# Patient Record
Sex: FEMALE | Race: ASIAN | Employment: UNEMPLOYED | URBAN - METROPOLITAN AREA
[De-identification: names, ages, dates, MRNs, and addresses within clinical notes are randomized per-mention and may not be internally consistent; named-entity substitution may affect disease eponyms.]

---

## 2017-01-05 PROBLEM — R10.84 GENERALIZED ABDOMINAL PAIN: Status: ACTIVE | Noted: 2017-01-05

## 2017-01-05 PROBLEM — D64.9 NORMOCYTIC ANEMIA: Status: ACTIVE | Noted: 2017-01-05

## 2017-01-05 PROBLEM — D69.6 THROMBOCYTOPENIA (HCC): Status: ACTIVE | Noted: 2017-01-05

## 2017-02-01 ENCOUNTER — OFFICE VISIT (OUTPATIENT)
Dept: FAMILY MEDICINE CLINIC | Age: 57
End: 2017-02-01

## 2017-02-01 VITALS
RESPIRATION RATE: 16 BRPM | OXYGEN SATURATION: 98 % | DIASTOLIC BLOOD PRESSURE: 66 MMHG | WEIGHT: 157 LBS | HEIGHT: 61 IN | SYSTOLIC BLOOD PRESSURE: 112 MMHG | HEART RATE: 83 BPM | TEMPERATURE: 97 F | BODY MASS INDEX: 29.64 KG/M2

## 2017-02-01 DIAGNOSIS — Z23 FLU VACCINE NEED: ICD-10-CM

## 2017-02-01 DIAGNOSIS — J45.20 MILD INTERMITTENT ASTHMA WITHOUT COMPLICATION: Primary | Chronic | ICD-10-CM

## 2017-02-01 DIAGNOSIS — Z13.220 SCREENING, LIPID: ICD-10-CM

## 2017-02-01 DIAGNOSIS — D69.6 THROMBOCYTOPENIA (HCC): ICD-10-CM

## 2017-02-01 DIAGNOSIS — G89.29 CHRONIC BILATERAL LOW BACK PAIN WITHOUT SCIATICA: ICD-10-CM

## 2017-02-01 DIAGNOSIS — Z12.11 SCREEN FOR COLON CANCER: ICD-10-CM

## 2017-02-01 DIAGNOSIS — D64.9 NORMOCYTIC ANEMIA: ICD-10-CM

## 2017-02-01 DIAGNOSIS — K21.9 GASTROESOPHAGEAL REFLUX DISEASE WITHOUT ESOPHAGITIS: ICD-10-CM

## 2017-02-01 DIAGNOSIS — M54.50 CHRONIC BILATERAL LOW BACK PAIN WITHOUT SCIATICA: ICD-10-CM

## 2017-02-01 DIAGNOSIS — F51.04 CHRONIC INSOMNIA: ICD-10-CM

## 2017-02-01 PROCEDURE — 99214 OFFICE O/P EST MOD 30 MIN: CPT | Performed by: FAMILY MEDICINE

## 2017-02-01 PROCEDURE — 90471 IMMUNIZATION ADMIN: CPT | Performed by: FAMILY MEDICINE

## 2017-02-01 PROCEDURE — 90654 INFLUENZA, 18-64 YRS, INTRADERMAL, PF (FLUZONE PF ID): CPT | Performed by: FAMILY MEDICINE

## 2017-02-01 RX ORDER — TRAZODONE HYDROCHLORIDE 50 MG/1
50 TABLET ORAL NIGHTLY PRN
Qty: 30 TABLET | Refills: 5 | Status: SHIPPED | OUTPATIENT
Start: 2017-02-01 | End: 2017-05-23 | Stop reason: SDUPTHER

## 2017-02-01 RX ORDER — OMEPRAZOLE 20 MG/1
20 CAPSULE, DELAYED RELEASE ORAL DAILY
Qty: 90 CAPSULE | Refills: 1 | Status: SHIPPED | OUTPATIENT
Start: 2017-02-01 | End: 2017-05-23 | Stop reason: SDUPTHER

## 2017-02-01 RX ORDER — SERTRALINE HYDROCHLORIDE 100 MG/1
100 TABLET, FILM COATED ORAL DAILY
Qty: 60 TABLET | Refills: 3 | Status: SHIPPED | OUTPATIENT
Start: 2017-02-01 | End: 2017-05-23 | Stop reason: SDUPTHER

## 2017-05-05 ENCOUNTER — OFFICE VISIT (OUTPATIENT)
Dept: NEUROLOGY | Age: 57
End: 2017-05-05

## 2017-05-05 VITALS
WEIGHT: 158 LBS | HEART RATE: 68 BPM | HEIGHT: 62 IN | DIASTOLIC BLOOD PRESSURE: 83 MMHG | SYSTOLIC BLOOD PRESSURE: 123 MMHG | BODY MASS INDEX: 29.08 KG/M2

## 2017-05-05 DIAGNOSIS — G43.719 INTRACTABLE CHRONIC MIGRAINE WITHOUT AURA AND WITHOUT STATUS MIGRAINOSUS: Primary | ICD-10-CM

## 2017-05-05 DIAGNOSIS — G44.40 REBOUND HEADACHE: ICD-10-CM

## 2017-05-05 PROCEDURE — 99213 OFFICE O/P EST LOW 20 MIN: CPT | Performed by: PSYCHIATRY & NEUROLOGY

## 2017-05-05 RX ORDER — TOPIRAMATE 50 MG/1
50 TABLET, FILM COATED ORAL 2 TIMES DAILY
COMMUNITY
End: 2017-05-05 | Stop reason: SDUPTHER

## 2017-05-05 RX ORDER — TOPIRAMATE 50 MG/1
50 TABLET, FILM COATED ORAL 2 TIMES DAILY
Qty: 60 TABLET | Refills: 5 | Status: SHIPPED | OUTPATIENT
Start: 2017-05-05 | End: 2018-04-10 | Stop reason: SDUPTHER

## 2017-05-23 ENCOUNTER — OFFICE VISIT (OUTPATIENT)
Dept: INTERNAL MEDICINE CLINIC | Age: 57
End: 2017-05-23

## 2017-05-23 VITALS
SYSTOLIC BLOOD PRESSURE: 90 MMHG | WEIGHT: 156 LBS | HEART RATE: 75 BPM | HEIGHT: 58 IN | BODY MASS INDEX: 32.75 KG/M2 | DIASTOLIC BLOOD PRESSURE: 58 MMHG | OXYGEN SATURATION: 98 %

## 2017-05-23 DIAGNOSIS — G43.901 MIGRAINE WITH STATUS MIGRAINOSUS, NOT INTRACTABLE, UNSPECIFIED MIGRAINE TYPE: ICD-10-CM

## 2017-05-23 DIAGNOSIS — F51.04 CHRONIC INSOMNIA: ICD-10-CM

## 2017-05-23 DIAGNOSIS — Z23 NEED FOR STREPTOCOCCUS PNEUMONIAE AND INFLUENZA VACCINATION: ICD-10-CM

## 2017-05-23 DIAGNOSIS — M54.6 ACUTE LEFT-SIDED THORACIC BACK PAIN: ICD-10-CM

## 2017-05-23 DIAGNOSIS — M06.9 RHEUMATOID ARTHRITIS INVOLVING MULTIPLE SITES, UNSPECIFIED RHEUMATOID FACTOR PRESENCE: Primary | ICD-10-CM

## 2017-05-23 DIAGNOSIS — Z13.220 SCREENING, LIPID: ICD-10-CM

## 2017-05-23 PROCEDURE — 99215 OFFICE O/P EST HI 40 MIN: CPT | Performed by: NURSE PRACTITIONER

## 2017-05-23 PROCEDURE — 90670 PCV13 VACCINE IM: CPT | Performed by: NURSE PRACTITIONER

## 2017-05-23 PROCEDURE — 36415 COLL VENOUS BLD VENIPUNCTURE: CPT | Performed by: NURSE PRACTITIONER

## 2017-05-23 PROCEDURE — 90471 IMMUNIZATION ADMIN: CPT | Performed by: NURSE PRACTITIONER

## 2017-05-23 RX ORDER — SUCRALFATE 1 G/1
1 TABLET ORAL 4 TIMES DAILY
Qty: 120 TABLET | Refills: 3 | Status: SHIPPED | OUTPATIENT
Start: 2017-05-23 | End: 2018-02-23 | Stop reason: SDUPTHER

## 2017-05-23 RX ORDER — ALBUTEROL SULFATE 90 UG/1
2 AEROSOL, METERED RESPIRATORY (INHALATION) EVERY 6 HOURS PRN
Qty: 1 INHALER | Refills: 1 | Status: CANCELLED | OUTPATIENT
Start: 2017-05-23

## 2017-05-23 RX ORDER — SUMATRIPTAN 50 MG/1
50 TABLET, FILM COATED ORAL
Qty: 9 TABLET | Refills: 3 | Status: SHIPPED | OUTPATIENT
Start: 2017-05-23 | End: 2017-06-19 | Stop reason: CLARIF

## 2017-05-23 RX ORDER — CHOLECALCIFEROL (VITAMIN D3) 50 MCG
2000 TABLET ORAL DAILY
Qty: 30 TABLET | Refills: 11 | Status: CANCELLED | OUTPATIENT
Start: 2017-05-23

## 2017-05-23 RX ORDER — TRAZODONE HYDROCHLORIDE 50 MG/1
50 TABLET ORAL NIGHTLY PRN
Qty: 30 TABLET | Refills: 5 | Status: SHIPPED | OUTPATIENT
Start: 2017-05-23 | End: 2017-11-20 | Stop reason: SDUPTHER

## 2017-05-23 RX ORDER — OMEPRAZOLE 20 MG/1
20 CAPSULE, DELAYED RELEASE ORAL DAILY
Qty: 90 CAPSULE | Refills: 1 | Status: SHIPPED | OUTPATIENT
Start: 2017-05-23 | End: 2017-10-04 | Stop reason: SDUPTHER

## 2017-05-23 RX ORDER — SERTRALINE HYDROCHLORIDE 100 MG/1
100 TABLET, FILM COATED ORAL DAILY
Qty: 60 TABLET | Refills: 3 | Status: SHIPPED | OUTPATIENT
Start: 2017-05-23 | End: 2017-11-20 | Stop reason: SDUPTHER

## 2017-05-23 RX ORDER — LEVOTHYROXINE SODIUM 0.07 MG/1
75 TABLET ORAL DAILY
Qty: 90 TABLET | Refills: 1 | Status: SHIPPED | OUTPATIENT
Start: 2017-05-23 | End: 2017-10-04 | Stop reason: SDUPTHER

## 2017-05-23 RX ORDER — POLYETHYLENE GLYCOL 3350 17 G/17G
17 POWDER, FOR SOLUTION ORAL DAILY
Qty: 1 BOTTLE | Refills: 2 | Status: SHIPPED | OUTPATIENT
Start: 2017-05-23 | End: 2019-05-28 | Stop reason: ALTCHOICE

## 2017-05-23 RX ORDER — CLOTRIMAZOLE 1 %
CREAM (GRAM) TOPICAL
Qty: 12 G | Refills: 1 | Status: SHIPPED | OUTPATIENT
Start: 2017-05-23 | End: 2017-05-30

## 2017-05-23 RX ORDER — ERGOCALCIFEROL 1.25 MG/1
50000 CAPSULE ORAL WEEKLY
Qty: 4 CAPSULE | Refills: 2 | Status: SHIPPED | OUTPATIENT
Start: 2017-05-23 | End: 2019-01-30 | Stop reason: SDUPTHER

## 2017-05-23 ASSESSMENT — ENCOUNTER SYMPTOMS
SHORTNESS OF BREATH: 0
ABDOMINAL PAIN: 1

## 2017-05-24 DIAGNOSIS — Z13.220 SCREENING, LIPID: ICD-10-CM

## 2017-05-25 DIAGNOSIS — M54.6 ACUTE LEFT-SIDED THORACIC BACK PAIN: ICD-10-CM

## 2017-05-25 LAB
ALBUMIN SERPL-MCNC: 4.8 G/DL (ref 3.4–5)
ANION GAP SERPL CALCULATED.3IONS-SCNC: 20 MMOL/L (ref 3–16)
BUN BLDV-MCNC: 11 MG/DL (ref 7–20)
CALCIUM SERPL-MCNC: 9.6 MG/DL (ref 8.3–10.6)
CHLORIDE BLD-SCNC: 102 MMOL/L (ref 99–110)
CHOLESTEROL, TOTAL: 159 MG/DL (ref 0–199)
CO2: 20 MMOL/L (ref 21–32)
CREAT SERPL-MCNC: 0.5 MG/DL (ref 0.6–1.1)
ESTIMATED AVERAGE GLUCOSE: 122.6 MG/DL
GFR AFRICAN AMERICAN: >60
GFR NON-AFRICAN AMERICAN: >60
GLUCOSE BLD-MCNC: 81 MG/DL (ref 70–99)
HBA1C MFR BLD: 5.9 %
HDLC SERPL-MCNC: 37 MG/DL (ref 40–60)
LDL CHOLESTEROL CALCULATED: 94 MG/DL
PHOSPHORUS: 3.7 MG/DL (ref 2.5–4.9)
POTASSIUM SERPL-SCNC: 4 MMOL/L (ref 3.5–5.1)
SODIUM BLD-SCNC: 142 MMOL/L (ref 136–145)
T3 FREE: 4 PG/ML (ref 2.3–4.2)
T4 FREE: 1.8 NG/DL (ref 0.9–1.8)
TRIGL SERPL-MCNC: 139 MG/DL (ref 0–150)
TSH REFLEX: 0.06 UIU/ML (ref 0.27–4.2)
VLDLC SERPL CALC-MCNC: 28 MG/DL

## 2017-05-26 LAB — HIV-1 AND HIV-2 ANTIBODIES: NEGATIVE

## 2017-06-19 ENCOUNTER — OFFICE VISIT (OUTPATIENT)
Dept: INTERNAL MEDICINE CLINIC | Age: 57
End: 2017-06-19

## 2017-06-19 VITALS
WEIGHT: 157 LBS | OXYGEN SATURATION: 97 % | HEART RATE: 70 BPM | BODY MASS INDEX: 32.81 KG/M2 | SYSTOLIC BLOOD PRESSURE: 114 MMHG | DIASTOLIC BLOOD PRESSURE: 70 MMHG

## 2017-06-19 DIAGNOSIS — B35.3 TINEA PEDIS OF BOTH FEET: ICD-10-CM

## 2017-06-19 DIAGNOSIS — M54.5 CHRONIC BILATERAL LOW BACK PAIN, WITH SCIATICA PRESENCE UNSPECIFIED: ICD-10-CM

## 2017-06-19 DIAGNOSIS — G43.709 CHRONIC MIGRAINE WITHOUT AURA WITHOUT STATUS MIGRAINOSUS, NOT INTRACTABLE: Primary | ICD-10-CM

## 2017-06-19 DIAGNOSIS — H60.502 ACUTE NONINFECTIVE OTITIS EXTERNA OF LEFT EAR, UNSPECIFIED TYPE: ICD-10-CM

## 2017-06-19 DIAGNOSIS — Z00.01 ENCOUNTER FOR WELL ADULT EXAM WITH ABNORMAL FINDINGS: ICD-10-CM

## 2017-06-19 DIAGNOSIS — G89.29 CHRONIC BILATERAL LOW BACK PAIN, WITH SCIATICA PRESENCE UNSPECIFIED: ICD-10-CM

## 2017-06-19 PROCEDURE — 99396 PREV VISIT EST AGE 40-64: CPT | Performed by: NURSE PRACTITIONER

## 2017-06-19 RX ORDER — CIPROFLOXACIN AND DEXAMETHASONE 3; 1 MG/ML; MG/ML
4 SUSPENSION/ DROPS AURICULAR (OTIC) 2 TIMES DAILY
Qty: 7.5 ML | Refills: 0 | Status: SHIPPED | OUTPATIENT
Start: 2017-06-19 | End: 2017-06-26

## 2017-06-19 RX ORDER — CLOTRIMAZOLE 1 %
CREAM (GRAM) TOPICAL
Qty: 40 G | Refills: 1 | Status: SHIPPED | OUTPATIENT
Start: 2017-06-19 | End: 2017-06-26

## 2017-06-19 RX ORDER — AMITRIPTYLINE HYDROCHLORIDE 10 MG/1
10 TABLET, FILM COATED ORAL NIGHTLY PRN
Qty: 30 TABLET | Refills: 3 | Status: SHIPPED | OUTPATIENT
Start: 2017-06-19 | End: 2017-11-20 | Stop reason: SDUPTHER

## 2017-06-19 RX ORDER — IBUPROFEN 800 MG/1
800 TABLET ORAL EVERY 8 HOURS PRN
Qty: 80 TABLET | Refills: 2 | Status: SHIPPED | OUTPATIENT
Start: 2017-06-19 | End: 2017-08-16 | Stop reason: ALTCHOICE

## 2017-06-19 ASSESSMENT — ENCOUNTER SYMPTOMS: BURN: 1

## 2017-06-20 ASSESSMENT — ENCOUNTER SYMPTOMS: ABDOMINAL PAIN: 1

## 2017-07-25 ENCOUNTER — OFFICE VISIT (OUTPATIENT)
Dept: INTERNAL MEDICINE CLINIC | Age: 57
End: 2017-07-25

## 2017-07-25 VITALS
HEIGHT: 58 IN | DIASTOLIC BLOOD PRESSURE: 70 MMHG | BODY MASS INDEX: 32.87 KG/M2 | HEART RATE: 75 BPM | TEMPERATURE: 98.3 F | SYSTOLIC BLOOD PRESSURE: 110 MMHG | WEIGHT: 156.6 LBS | RESPIRATION RATE: 16 BRPM | OXYGEN SATURATION: 96 %

## 2017-07-25 DIAGNOSIS — R06.02 SHORTNESS OF BREATH: Primary | ICD-10-CM

## 2017-07-25 DIAGNOSIS — M25.561 CHRONIC PAIN OF RIGHT KNEE: ICD-10-CM

## 2017-07-25 DIAGNOSIS — G89.29 CHRONIC PAIN OF RIGHT KNEE: ICD-10-CM

## 2017-07-25 PROCEDURE — 99213 OFFICE O/P EST LOW 20 MIN: CPT | Performed by: FAMILY MEDICINE

## 2017-07-25 RX ORDER — ALBUTEROL SULFATE 90 UG/1
2 AEROSOL, METERED RESPIRATORY (INHALATION) EVERY 6 HOURS PRN
Qty: 1 INHALER | Refills: 5 | Status: SHIPPED | OUTPATIENT
Start: 2017-07-25 | End: 2018-02-23 | Stop reason: SDUPTHER

## 2017-07-25 RX ORDER — HYDROCODONE BITARTRATE AND ACETAMINOPHEN 5; 325 MG/1; MG/1
1 TABLET ORAL EVERY 8 HOURS PRN
Qty: 30 TABLET | Refills: 0 | Status: SHIPPED | OUTPATIENT
Start: 2017-07-25 | End: 2017-08-04

## 2017-08-02 ENCOUNTER — HOSPITAL ENCOUNTER (OUTPATIENT)
Dept: OTHER | Age: 57
Discharge: HOME | End: 2017-08-02
Attending: FAMILY MEDICINE | Admitting: FAMILY MEDICINE

## 2017-08-02 DIAGNOSIS — G89.29 CHRONIC PAIN OF RIGHT KNEE: ICD-10-CM

## 2017-08-02 DIAGNOSIS — M25.561 CHRONIC PAIN OF RIGHT KNEE: ICD-10-CM

## 2017-08-02 DIAGNOSIS — M25.562 PAIN IN BOTH KNEES, UNSPECIFIED CHRONICITY: ICD-10-CM

## 2017-08-02 DIAGNOSIS — M25.561 PAIN IN BOTH KNEES, UNSPECIFIED CHRONICITY: ICD-10-CM

## 2017-08-15 ENCOUNTER — OFFICE VISIT (OUTPATIENT)
Dept: RHEUMATOLOGY | Age: 57
End: 2017-08-15

## 2017-08-15 VITALS
SYSTOLIC BLOOD PRESSURE: 124 MMHG | DIASTOLIC BLOOD PRESSURE: 82 MMHG | TEMPERATURE: 98.6 F | BODY MASS INDEX: 29.44 KG/M2 | WEIGHT: 160 LBS | HEIGHT: 62 IN

## 2017-08-15 DIAGNOSIS — K21.9 GASTROESOPHAGEAL REFLUX DISEASE WITHOUT ESOPHAGITIS: ICD-10-CM

## 2017-08-15 DIAGNOSIS — M15.9 GENERALIZED OSTEOARTHRITIS: Primary | ICD-10-CM

## 2017-08-15 PROCEDURE — 99244 OFF/OP CNSLTJ NEW/EST MOD 40: CPT | Performed by: INTERNAL MEDICINE

## 2017-08-15 RX ORDER — TIZANIDINE 2 MG/1
TABLET ORAL
Qty: 90 TABLET | Refills: 1 | Status: SHIPPED | OUTPATIENT
Start: 2017-08-15 | End: 2019-10-11 | Stop reason: SDUPTHER

## 2017-08-16 ENCOUNTER — OFFICE VISIT (OUTPATIENT)
Dept: INTERNAL MEDICINE CLINIC | Age: 57
End: 2017-08-16

## 2017-08-16 VITALS
TEMPERATURE: 98.5 F | SYSTOLIC BLOOD PRESSURE: 124 MMHG | OXYGEN SATURATION: 96 % | BODY MASS INDEX: 29.26 KG/M2 | WEIGHT: 159 LBS | DIASTOLIC BLOOD PRESSURE: 80 MMHG | HEART RATE: 75 BPM

## 2017-08-16 DIAGNOSIS — F41.9 ANXIETY DISORDER, UNSPECIFIED TYPE: ICD-10-CM

## 2017-08-16 DIAGNOSIS — M54.5 CHRONIC BILATERAL LOW BACK PAIN, WITH SCIATICA PRESENCE UNSPECIFIED: Primary | ICD-10-CM

## 2017-08-16 DIAGNOSIS — G89.29 CHRONIC BILATERAL LOW BACK PAIN, WITH SCIATICA PRESENCE UNSPECIFIED: Primary | ICD-10-CM

## 2017-08-16 PROCEDURE — 99213 OFFICE O/P EST LOW 20 MIN: CPT | Performed by: NURSE PRACTITIONER

## 2017-08-16 RX ORDER — ACETAMINOPHEN 500 MG
TABLET ORAL
Qty: 120 TABLET | Refills: 3 | Status: SHIPPED | OUTPATIENT
Start: 2017-08-16 | End: 2017-11-20 | Stop reason: SDUPTHER

## 2017-08-16 ASSESSMENT — ENCOUNTER SYMPTOMS: ABDOMINAL PAIN: 1

## 2017-08-21 ASSESSMENT — ENCOUNTER SYMPTOMS: BACK PAIN: 1

## 2017-08-27 ASSESSMENT — ENCOUNTER SYMPTOMS: SHORTNESS OF BREATH: 1

## 2017-10-04 ENCOUNTER — OFFICE VISIT (OUTPATIENT)
Dept: INTERNAL MEDICINE CLINIC | Age: 57
End: 2017-10-04

## 2017-10-04 VITALS
HEART RATE: 71 BPM | WEIGHT: 159 LBS | DIASTOLIC BLOOD PRESSURE: 80 MMHG | SYSTOLIC BLOOD PRESSURE: 116 MMHG | BODY MASS INDEX: 29.26 KG/M2 | OXYGEN SATURATION: 98 %

## 2017-10-04 DIAGNOSIS — K21.9 GASTROESOPHAGEAL REFLUX DISEASE WITHOUT ESOPHAGITIS: ICD-10-CM

## 2017-10-04 DIAGNOSIS — M54.6 ACUTE LEFT-SIDED THORACIC BACK PAIN: ICD-10-CM

## 2017-10-04 DIAGNOSIS — M72.2 PLANTAR FASCIITIS, BILATERAL: Primary | ICD-10-CM

## 2017-10-04 PROCEDURE — 99213 OFFICE O/P EST LOW 20 MIN: CPT | Performed by: FAMILY MEDICINE

## 2017-10-04 RX ORDER — OMEPRAZOLE 20 MG/1
20 CAPSULE, DELAYED RELEASE ORAL DAILY
Qty: 90 CAPSULE | Refills: 1 | Status: SHIPPED | OUTPATIENT
Start: 2017-10-04 | End: 2018-04-10 | Stop reason: SDUPTHER

## 2017-10-04 RX ORDER — LEVOTHYROXINE SODIUM 0.07 MG/1
75 TABLET ORAL DAILY
Qty: 90 TABLET | Refills: 1 | Status: SHIPPED | OUTPATIENT
Start: 2017-10-04 | End: 2018-04-10 | Stop reason: SDUPTHER

## 2017-10-04 NOTE — MR AVS SNAPSHOT
After Visit Summary             Anneliese Galindo   10/4/2017 10:45 AM   Office Visit    Description:  Female : 1960   Provider:  Yovani Perez DO   Department:  [address] [place] [address] and Future Appointments         Below is a list of your follow-up and future appointments. This may not be a complete list as you may have made appointments directly with providers that we are not aware of or your providers may have made some for you. Please call your providers to confirm appointments. It is important to keep your appointments. Please bring your current insurance card, photo ID, co-pay, and all medication bottles to your appointment. If self-pay, payment is expected at the time of service. Your To-Do List     Future Appointments Provider Department Dept Phone    2017 8:30 AM Luna Rojas [address] [phone]    Please arrive 15 minutes prior to appointment, bring photo ID and insurance card. Follow-Up    Return if symptoms worsen or fail to improve. Information from Your Visit        Department     Name Address Phone Fax    [address] [facility] [address] [place] [address] [phone]      You Were Seen for:         Comments    Plantar fasciitis, bilateral   [[id number]]         Vital Signs     Blood Pressure Pulse Weight Oxygen Saturation Body Mass Index Smoking Status    116/80 71 159 lb (72.1 kg) 98% 29.26 kg/m2 Never Smoker      Additional Information about your Body Mass Index (BMI)           Your BMI as listed above is considered overweight (25.0-29.9). BMI is an estimate of body fat, calculated from your height and weight. The higher your BMI, the greater your risk of heart disease, high blood pressure, type 2 diabetes, stroke, gallstones, arthritis, sleep apnea, and certain cancers. BMI is not perfect.   It may overestimate body fat in athletes and angle. This position gives the bottom of your foot a constant, gentle stretch. · Do simple exercises such as calf stretches and towel stretches 2 to 3 times each day, especially when you first get up in the morning. These can help the plantar fascia become more flexible. They also make the muscles that support your arch stronger. Hold these stretches for 15 to 30 seconds per stretch. Repeat 2 to 4 times. ¨ Stand about 1 foot from a wall. Place the palms of both hands against the wall at chest level. Lean forward against the wall, keeping one leg with the knee straight and heel on the ground while bending the knee of the other leg. ¨ Sit down on the floor or a mat with your feet stretched in front of you. Roll up a towel lengthwise, and loop it over the ball of your foot. Holding the towel at both ends, gently pull the towel toward you to stretch your foot. · Wear shoes with good arch support. Athletic shoes or shoes with a well-cushioned sole are good choices. · Try heel cups or shoe inserts (orthotics) to help cushion your heel. You can buy these at many shoe stores. · Put on your shoes as soon as you get out of bed. Going barefoot or wearing slippers may make your pain worse. · Reach and stay at a good weight for your height. This puts less strain on your feet. When should you call for help? Call your doctor now or seek immediate medical care if:  · You have heel pain with fever, redness, or warmth in your heel. · You cannot put weight on the sore foot. Watch closely for changes in your health, and be sure to contact your doctor if:  · You have numbness or tingling in your heel. · Your heel pain lasts more than 2 weeks. Where can you learn more? Go to https://juana.[employer]. org and sign in to your [employer] account. Enter F100 in the SpareFoot box to learn more about \"Plantar Fasciitis: Care Instructions. \" If you do not have an account, please click on the \"Sign Up Now\" link. Current as of: March 21, 2017  Content Version: 11.3  © 4334-7488 [employer]. Care instructions adapted under license by [facility] ([facility]). If you have questions about a medical condition or this instruction, always ask your healthcare professional. [address] any warranty or liability for your use of this information. Where to Get Your Medications      These medications were sent to [address], [address] [place] [address]     Phone:  [phone]     levothyroxine 75 MCG tablet    omeprazole 20 MG delayed release capsule         Your Current Medications Are              levothyroxine (SYNTHROID) 75 MCG tablet Take 1 tablet by mouth Daily    omeprazole (PRILOSEC) 20 MG delayed release capsule Take 1 capsule by mouth daily for 365 doses    Elastic Bandages & Supports ([employer] LUMBAR/BACK SUPPORT BRACE) MISC 1 applicator by Does not apply route daily    acetaminophen (APAP EXTRA STRENGTH) 500 MG tablet Tab 2 once a day    diclofenac sodium (VOLTAREN) 1 % GEL Apply 2 grams in affected joints 2-3 times a day. tiZANidine (ZANAFLEX) 2 MG tablet Take 1 tab po at night.     albuterol sulfate HFA (PROVENTIL HFA) 108 (90 Base) MCG/ACT inhaler Inhale 2 puffs into the lungs every 6 hours as needed for Wheezing or Shortness of Breath (and/or persistent cough)    amitriptyline (ELAVIL) 10 MG tablet Take 1 tablet by mouth nightly as needed for Sleep    sertraline (ZOLOFT) 100 MG tablet Take 1 tablet by mouth daily    traZODone (DESYREL) 50 MG tablet Take 1 tablet by mouth nightly as needed for Sleep    vitamin D (ERGOCALCIFEROL) 54136 UNITS CAPS capsule Take 1 capsule by mouth once a week    polyethylene glycol (GLYCOLAX) powder Take 17 g by mouth daily    sucralfate (CARAFATE) 1 GM tablet Take 1 tablet by mouth 4 times daily

## 2017-10-04 NOTE — PATIENT INSTRUCTIONS
4 times. Towel curls    1. While sitting, place your foot on a towel on the floor and scrunch the towel toward you with your toes. 2. Then, also using your toes, push the towel away from you. Note: Make this exercise more challenging by placing a weighted object, such as a soup can, on the other end of the towel. [place] pickups    1. Put marbles on the floor next to a cup.  2. Using your toes, try to lift the marbles up from the floor and put them in the cup. Follow-up care is a key part of your treatment and safety. Be sure to make and go to all appointments, and call your doctor if you are having problems. It's also a good idea to know your test results and keep a list of the medicines you take. Where can you learn more? Go to https://Xterprise Solutionspepiceweb.[employer]. org and sign in to your [employer]ia account. Enter I631 in the fake company 2.0 box to learn more about \"Plantar Fasciitis: Exercises. \"     If you do not have an account, please click on the \"Sign Up Now\" link. Current as of: March 21, 2017  Content Version: 11.3  © 1415-2334 [employer] Care instructions adapted under license by [facility] ([facility]). If you have questions about a medical condition or this instruction, always ask your healthcare professional. [place] [address] any warranty or liability for your use of this information. Plantar Fasciitis: Care Instructions  Your Care Instructions    Plantar fasciitis is pain and inflammation of the plantar fascia, the tissue at the bottom of your foot that connects the heel bone to the toes. The plantar fascia also supports the arch. If you strain the plantar fascia, it can develop small tears and cause heel pain when you stand or walk. Plantar fasciitis can be caused by running or other sports. It also may occur in people who are overweight or who have high arches or flat feet.  You may get plantar fasciitis if you walk or stand for long periods, or have a tight liability for your use of this information.

## 2017-11-20 ENCOUNTER — OFFICE VISIT (OUTPATIENT)
Dept: INTERNAL MEDICINE CLINIC | Age: 57
End: 2017-11-20

## 2017-11-20 VITALS
WEIGHT: 161 LBS | OXYGEN SATURATION: 99 % | DIASTOLIC BLOOD PRESSURE: 70 MMHG | BODY MASS INDEX: 29.63 KG/M2 | SYSTOLIC BLOOD PRESSURE: 118 MMHG | HEART RATE: 89 BPM

## 2017-11-20 DIAGNOSIS — M54.5 CHRONIC BILATERAL LOW BACK PAIN, WITH SCIATICA PRESENCE UNSPECIFIED: ICD-10-CM

## 2017-11-20 DIAGNOSIS — F51.04 CHRONIC INSOMNIA: ICD-10-CM

## 2017-11-20 DIAGNOSIS — G89.29 CHRONIC BILATERAL LOW BACK PAIN, WITH SCIATICA PRESENCE UNSPECIFIED: ICD-10-CM

## 2017-11-20 DIAGNOSIS — Z13.1 SCREENING FOR DIABETES MELLITUS: ICD-10-CM

## 2017-11-20 DIAGNOSIS — E53.9 BURNING FEET SYNDROME: ICD-10-CM

## 2017-11-20 DIAGNOSIS — Z13.220 SCREENING, LIPID: ICD-10-CM

## 2017-11-20 DIAGNOSIS — L85.3 DRY SKIN DERMATITIS: Primary | ICD-10-CM

## 2017-11-20 DIAGNOSIS — G43.709 CHRONIC MIGRAINE WITHOUT AURA WITHOUT STATUS MIGRAINOSUS, NOT INTRACTABLE: ICD-10-CM

## 2017-11-20 PROCEDURE — 99213 OFFICE O/P EST LOW 20 MIN: CPT | Performed by: NURSE PRACTITIONER

## 2017-11-20 RX ORDER — GABAPENTIN 100 MG/1
100 CAPSULE ORAL 3 TIMES DAILY
Qty: 90 CAPSULE | Refills: 3 | Status: SHIPPED | OUTPATIENT
Start: 2017-11-20 | End: 2018-04-10 | Stop reason: SDUPTHER

## 2017-11-20 RX ORDER — SERTRALINE HYDROCHLORIDE 100 MG/1
100 TABLET, FILM COATED ORAL DAILY
Qty: 60 TABLET | Refills: 3 | Status: SHIPPED | OUTPATIENT
Start: 2017-11-20 | End: 2018-04-10 | Stop reason: SDUPTHER

## 2017-11-20 RX ORDER — TRAZODONE HYDROCHLORIDE 50 MG/1
50 TABLET ORAL NIGHTLY PRN
Qty: 30 TABLET | Refills: 5 | Status: SHIPPED | OUTPATIENT
Start: 2017-11-20 | End: 2018-04-10 | Stop reason: SDUPTHER

## 2017-11-20 RX ORDER — AMITRIPTYLINE HYDROCHLORIDE 10 MG/1
10 TABLET, FILM COATED ORAL NIGHTLY
Qty: 30 TABLET | Refills: 4 | Status: SHIPPED | OUTPATIENT
Start: 2017-11-20 | End: 2018-04-10 | Stop reason: SDUPTHER

## 2017-11-20 RX ORDER — ACETAMINOPHEN 500 MG
TABLET ORAL
Qty: 120 TABLET | Refills: 3 | Status: SHIPPED | OUTPATIENT
Start: 2017-11-20 | End: 2021-02-24 | Stop reason: CLARIF

## 2017-11-20 ASSESSMENT — PATIENT HEALTH QUESTIONNAIRE - PHQ9
2. FEELING DOWN, DEPRESSED OR HOPELESS: 0
SUM OF ALL RESPONSES TO PHQ9 QUESTIONS 1 & 2: 0
SUM OF ALL RESPONSES TO PHQ QUESTIONS 1-9: 0
1. LITTLE INTEREST OR PLEASURE IN DOING THINGS: 0

## 2017-11-20 NOTE — PATIENT INSTRUCTIONS
Keep feet and legs moist with aquafor at least 3 times a day  Drink plenty of water  Take the neurontin 3 times a day every day  Do not scratch your feet or legs  Check water temperature with elbow and not with fingers

## 2017-11-21 LAB
ESTIMATED AVERAGE GLUCOSE: 122.6 MG/DL
HBA1C MFR BLD: 5.9 %

## 2018-02-23 ENCOUNTER — OFFICE VISIT (OUTPATIENT)
Dept: FAMILY MEDICINE CLINIC | Age: 58
End: 2018-02-23

## 2018-02-23 VITALS
TEMPERATURE: 97 F | OXYGEN SATURATION: 98 % | HEIGHT: 62 IN | DIASTOLIC BLOOD PRESSURE: 76 MMHG | SYSTOLIC BLOOD PRESSURE: 118 MMHG | WEIGHT: 162 LBS | HEART RATE: 89 BPM | BODY MASS INDEX: 29.81 KG/M2 | RESPIRATION RATE: 16 BRPM

## 2018-02-23 DIAGNOSIS — J45.20 MILD INTERMITTENT ASTHMA WITHOUT COMPLICATION: Primary | Chronic | ICD-10-CM

## 2018-02-23 DIAGNOSIS — Z13.220 SCREENING, LIPID: ICD-10-CM

## 2018-02-23 DIAGNOSIS — K21.9 GASTROESOPHAGEAL REFLUX DISEASE WITHOUT ESOPHAGITIS: ICD-10-CM

## 2018-02-23 DIAGNOSIS — R06.02 SHORTNESS OF BREATH: ICD-10-CM

## 2018-02-23 DIAGNOSIS — R73.03 PRE-DIABETES: ICD-10-CM

## 2018-02-23 PROCEDURE — 99214 OFFICE O/P EST MOD 30 MIN: CPT | Performed by: FAMILY MEDICINE

## 2018-02-23 RX ORDER — SUCRALFATE 1 G/1
1 TABLET ORAL 4 TIMES DAILY
Qty: 120 TABLET | Refills: 3 | Status: SHIPPED | OUTPATIENT
Start: 2018-02-23 | End: 2019-05-28 | Stop reason: ALTCHOICE

## 2018-02-23 RX ORDER — OMEPRAZOLE 20 MG/1
20 CAPSULE, DELAYED RELEASE ORAL DAILY
Qty: 90 CAPSULE | Refills: 1 | Status: CANCELLED | OUTPATIENT
Start: 2018-02-23 | End: 2019-02-23

## 2018-02-23 RX ORDER — ALBUTEROL SULFATE 90 UG/1
2 AEROSOL, METERED RESPIRATORY (INHALATION) EVERY 6 HOURS PRN
Qty: 1 INHALER | Refills: 5 | Status: SHIPPED | OUTPATIENT
Start: 2018-02-23 | End: 2018-04-10 | Stop reason: SDUPTHER

## 2018-02-23 ASSESSMENT — ENCOUNTER SYMPTOMS
CHEST TIGHTNESS: 0
HEMOPTYSIS: 0
BELCHING: 0
GLOBUS SENSATION: 0
STRIDOR: 0
SPUTUM PRODUCTION: 0
FREQUENT THROAT CLEARING: 1
TROUBLE SWALLOWING: 0
RHINORRHEA: 0
NAUSEA: 0
WHEEZING: 0
SHORTNESS OF BREATH: 0
HEARTBURN: 0
SORE THROAT: 0
DIFFICULTY BREATHING: 0
CHOKING: 0
HOARSE VOICE: 0
ABDOMINAL PAIN: 1
COUGH: 0

## 2018-02-23 NOTE — PROGRESS NOTES
D) 2000 UNITS TABS tablet, Take 1 tablet by mouth daily After finishing 12 week couse, Disp: 30 tablet, Rfl: 11    cyclobenzaprine (FLEXERIL) 5 MG tablet, Take 1 tablet by mouth every 8 hours as needed for Muscle spasms, Disp: 21 tablet, Rfl: 0    mineral oil-hydrophilic petrolatum (AQUAPHOR) ointment, Apply topically as needed. , Disp: 99 g, Rfl: 1    gabapentin (NEURONTIN) 100 MG capsule, Take 1 capsule by mouth 3 times daily, Disp: 90 capsule, Rfl: 3    vitamin D (ERGOCALCIFEROL) 97720 UNITS CAPS capsule, Take 1 capsule by mouth once a week, Disp: 4 capsule, Rfl: 2    polyethylene glycol (GLYCOLAX) powder, Take 17 g by mouth daily, Disp: 1 Bottle, Rfl: 2    ondansetron (ZOFRAN ODT) 4 MG disintegrating tablet, Take 1-2 tablets by mouth every 12 hours as needed for Nausea for up to 12 doses. , Disp: 12 tablet, Rfl: 0    sennosides-docusate sodium (SENOKOT-S) 8.6-50 MG tablet, Take 2 tablets by mouth daily. , Disp: , Rfl:      has a past medical history of Arthritis; Asthma; GERD (gastroesophageal reflux disease); Intractable chronic migraine without aura and without status migrainosus; Primary osteoarthritis of both hands; and Thyroid disease. History reviewed. No pertinent surgical history. reports that she has never smoked. She has never used smokeless tobacco. She reports that she does not drink alcohol or use drugs. Family history is unknown by patient. Objective:   Physical Exam   Constitutional: She is oriented to person, place, and time. She appears well-developed and well-nourished. No distress. HENT:   Head: Normocephalic and atraumatic. Right Ear: External ear normal.   Left Ear: External ear normal.   Nose: Nose normal.   Mouth/Throat: Oropharynx is clear and moist. No oropharyngeal exudate. Eyes: Conjunctivae and EOM are normal. Pupils are equal, round, and reactive to light. Right eye exhibits no discharge. Left eye exhibits no discharge. No scleral icterus.    Neck: Normal range of motion. Neck supple. No JVD present. No tracheal deviation present. No thyromegaly present. Cardiovascular: Normal rate, regular rhythm, normal heart sounds and intact distal pulses. Exam reveals no gallop and no friction rub. No murmur heard. Pulmonary/Chest: Effort normal and breath sounds normal. No respiratory distress. She has no wheezes. She has no rales. She exhibits no tenderness. Abdominal: Soft. Bowel sounds are normal. She exhibits no distension and no mass. There is no tenderness. There is no rebound and no guarding. Musculoskeletal: Normal range of motion. She exhibits no edema or tenderness. Lymphadenopathy:     She has no cervical adenopathy. Neurological: She is alert and oriented to person, place, and time. No cranial nerve deficit. She exhibits normal muscle tone. Coordination normal.   Skin: Skin is warm. No rash noted. She is not diaphoretic. No erythema. No pallor. Psychiatric: She has a normal mood and affect. Her behavior is normal. Judgment and thought content normal.       Assessment:      1. Mild intermittent asthma without complication     2. Shortness of breath  albuterol sulfate HFA (PROVENTIL HFA) 108 (90 Base) MCG/ACT inhaler   3. Gastroesophageal reflux disease without esophagitis     4. Screening, lipid  sucralfate (CARAFATE) 1 GM tablet   5. Pre-diabetes  Hemoglobin A1C           Plan:      1. Stable  Vaccines utd  Refills    2. Sec to # 1 stable  Use albuterol prn    3. Refill carafate  Continue ppi  encourage wt loss      4. Fu labs    5. Check a1c  Diet discussed      Pt is to fu w rheumatologist for her hx of pain/arthritis    Makayla received counseling on the following healthy behaviors: nutrition and exercise    Patient given educational materials on pre dm      I have instructed Makyala to complete a self tracking handout on Weights and instructed them to bring it with them to her next appointment.      Discussed use, benefit, and side effects of

## 2018-02-24 LAB
ESTIMATED AVERAGE GLUCOSE: 125.5 MG/DL
HBA1C MFR BLD: 6 %

## 2018-03-07 ENCOUNTER — TELEPHONE (OUTPATIENT)
Dept: FAMILY MEDICINE CLINIC | Age: 58
End: 2018-03-07

## 2018-04-10 ENCOUNTER — OFFICE VISIT (OUTPATIENT)
Dept: FAMILY MEDICINE CLINIC | Age: 58
End: 2018-04-10

## 2018-04-10 VITALS
TEMPERATURE: 97.4 F | BODY MASS INDEX: 29.81 KG/M2 | HEIGHT: 62 IN | DIASTOLIC BLOOD PRESSURE: 74 MMHG | OXYGEN SATURATION: 97 % | SYSTOLIC BLOOD PRESSURE: 106 MMHG | HEART RATE: 63 BPM | RESPIRATION RATE: 16 BRPM | WEIGHT: 162 LBS

## 2018-04-10 DIAGNOSIS — B35.3 TINEA PEDIS OF BOTH FEET: ICD-10-CM

## 2018-04-10 DIAGNOSIS — E53.9 BURNING FEET SYNDROME: ICD-10-CM

## 2018-04-10 DIAGNOSIS — M54.6 ACUTE LEFT-SIDED THORACIC BACK PAIN: ICD-10-CM

## 2018-04-10 DIAGNOSIS — G89.29 CHRONIC UPPER BACK PAIN: Primary | ICD-10-CM

## 2018-04-10 DIAGNOSIS — F51.04 CHRONIC INSOMNIA: ICD-10-CM

## 2018-04-10 DIAGNOSIS — M54.9 CHRONIC UPPER BACK PAIN: Primary | ICD-10-CM

## 2018-04-10 PROCEDURE — 99214 OFFICE O/P EST MOD 30 MIN: CPT | Performed by: FAMILY MEDICINE

## 2018-04-10 RX ORDER — CYCLOBENZAPRINE HCL 5 MG
5 TABLET ORAL EVERY 8 HOURS PRN
Qty: 21 TABLET | Refills: 0 | Status: SHIPPED | OUTPATIENT
Start: 2018-04-10 | End: 2018-06-13 | Stop reason: SDUPTHER

## 2018-04-10 RX ORDER — TRAZODONE HYDROCHLORIDE 50 MG/1
50 TABLET ORAL NIGHTLY PRN
Qty: 30 TABLET | Refills: 5 | Status: SHIPPED | OUTPATIENT
Start: 2018-04-10 | End: 2019-05-20 | Stop reason: SDUPTHER

## 2018-04-10 RX ORDER — ALBUTEROL SULFATE 90 UG/1
2 AEROSOL, METERED RESPIRATORY (INHALATION) EVERY 6 HOURS PRN
Qty: 1 INHALER | Refills: 5 | Status: SHIPPED | OUTPATIENT
Start: 2018-04-10 | End: 2019-01-30 | Stop reason: SDUPTHER

## 2018-04-10 RX ORDER — TOPIRAMATE 50 MG/1
50 TABLET, FILM COATED ORAL 2 TIMES DAILY
Qty: 60 TABLET | Refills: 5 | Status: SHIPPED | OUTPATIENT
Start: 2018-04-10 | End: 2019-05-28 | Stop reason: ALTCHOICE

## 2018-04-10 RX ORDER — CHOLECALCIFEROL (VITAMIN D3) 50 MCG
2000 TABLET ORAL DAILY
Qty: 30 TABLET | Refills: 11 | Status: SHIPPED | OUTPATIENT
Start: 2018-04-10 | End: 2019-01-30 | Stop reason: SDUPTHER

## 2018-04-10 RX ORDER — SERTRALINE HYDROCHLORIDE 100 MG/1
100 TABLET, FILM COATED ORAL DAILY
Qty: 60 TABLET | Refills: 3 | Status: SHIPPED | OUTPATIENT
Start: 2018-04-10 | End: 2019-01-30 | Stop reason: SDUPTHER

## 2018-04-10 RX ORDER — CLOTRIMAZOLE AND BETAMETHASONE DIPROPIONATE 10; .64 MG/G; MG/G
CREAM TOPICAL
Qty: 1 TUBE | Refills: 0 | Status: SHIPPED | OUTPATIENT
Start: 2018-04-10 | End: 2020-03-10

## 2018-04-10 RX ORDER — LEVOTHYROXINE SODIUM 0.07 MG/1
75 TABLET ORAL DAILY
Qty: 90 TABLET | Refills: 1 | Status: SHIPPED | OUTPATIENT
Start: 2018-04-10 | End: 2018-09-27 | Stop reason: SDUPTHER

## 2018-04-10 RX ORDER — GABAPENTIN 100 MG/1
100 CAPSULE ORAL 3 TIMES DAILY
Qty: 90 CAPSULE | Refills: 3 | Status: SHIPPED | OUTPATIENT
Start: 2018-04-10 | End: 2019-05-20 | Stop reason: SDUPTHER

## 2018-04-10 RX ORDER — AMITRIPTYLINE HYDROCHLORIDE 10 MG/1
10 TABLET, FILM COATED ORAL NIGHTLY
Qty: 30 TABLET | Refills: 4 | Status: SHIPPED | OUTPATIENT
Start: 2018-04-10 | End: 2019-01-30 | Stop reason: SDUPTHER

## 2018-04-10 RX ORDER — OMEPRAZOLE 20 MG/1
20 CAPSULE, DELAYED RELEASE ORAL DAILY
Qty: 90 CAPSULE | Refills: 1 | Status: SHIPPED | OUTPATIENT
Start: 2018-04-10 | End: 2018-09-27 | Stop reason: SDUPTHER

## 2018-04-10 ASSESSMENT — ENCOUNTER SYMPTOMS
BACK PAIN: 1
BOWEL INCONTINENCE: 0
ABDOMINAL PAIN: 0

## 2018-05-02 ENCOUNTER — HOSPITAL ENCOUNTER (OUTPATIENT)
Dept: PHYSICAL THERAPY | Age: 58
Discharge: HOME | End: 2018-05-31
Attending: FAMILY MEDICINE | Admitting: FAMILY MEDICINE

## 2018-05-02 ASSESSMENT — PAIN DESCRIPTION - FREQUENCY: FREQUENCY: CONTINUOUS

## 2018-05-02 ASSESSMENT — PAIN DESCRIPTION - ORIENTATION: ORIENTATION: RIGHT;LEFT

## 2018-05-02 ASSESSMENT — PAIN DESCRIPTION - ONSET: ONSET: AWAKENED FROM SLEEP

## 2018-05-02 ASSESSMENT — PAIN SCALES - GENERAL: PAINLEVEL_OUTOF10: 7

## 2018-05-02 ASSESSMENT — PAIN DESCRIPTION - DESCRIPTORS: DESCRIPTORS: ACHING;CONSTANT;NAGGING;SHARP

## 2018-05-02 ASSESSMENT — PAIN DESCRIPTION - PAIN TYPE: TYPE: CHRONIC PAIN

## 2018-05-11 ENCOUNTER — HOSPITAL ENCOUNTER (OUTPATIENT)
Dept: PHYSICAL THERAPY | Age: 58
Discharge: HOME | End: 2018-05-12
Admitting: FAMILY MEDICINE

## 2018-06-01 ENCOUNTER — HOSPITAL ENCOUNTER (OUTPATIENT)
Dept: OTHER | Age: 58
Discharge: HOME | End: 2018-06-30
Attending: FAMILY MEDICINE | Admitting: FAMILY MEDICINE

## 2018-06-01 NOTE — PROGRESS NOTES
Physical Therapy  Cancellation/No-show Note  Patient Name:  Conor Denney  :  1960   Date:  2018  Cancelled visits to date: 1 (18)   No-shows to date: 1    Patient status for today's appointment patient:  [x]  Cancelled  []  Rescheduled appointment  [x]  No-show 18    Reason given by patient:  []  Patient ill  []  Conflicting appointment  []  No transportation    []  Conflict with work  []  No reason given  []  Other:     Comments:    Note made of family member death  Phone call information:   []  Phone call made today to patient at _ time at number provided:      []  Patient answered, conversation as follows:    []  Patient did not answer, message left as follows:  [x]  Phone call not made today    Electronically signed by:  Josefina Shin PT

## 2018-06-13 ENCOUNTER — OFFICE VISIT (OUTPATIENT)
Dept: FAMILY MEDICINE CLINIC | Age: 58
End: 2018-06-13

## 2018-06-13 VITALS
SYSTOLIC BLOOD PRESSURE: 104 MMHG | OXYGEN SATURATION: 97 % | HEART RATE: 65 BPM | RESPIRATION RATE: 16 BRPM | DIASTOLIC BLOOD PRESSURE: 70 MMHG | BODY MASS INDEX: 30.36 KG/M2 | HEIGHT: 62 IN | WEIGHT: 165 LBS | TEMPERATURE: 97.2 F

## 2018-06-13 DIAGNOSIS — Z01.818 PREOP EXAMINATION: Primary | ICD-10-CM

## 2018-06-13 DIAGNOSIS — G89.29 CHRONIC BILATERAL THORACIC BACK PAIN: ICD-10-CM

## 2018-06-13 DIAGNOSIS — H26.9 CATARACT OF BOTH EYES, UNSPECIFIED CATARACT TYPE: ICD-10-CM

## 2018-06-13 DIAGNOSIS — M54.6 CHRONIC BILATERAL THORACIC BACK PAIN: ICD-10-CM

## 2018-06-13 DIAGNOSIS — R10.2 PELVIC PAIN IN FEMALE: ICD-10-CM

## 2018-06-13 DIAGNOSIS — R10.84 GENERALIZED ABDOMINAL PAIN: ICD-10-CM

## 2018-06-13 LAB
BILIRUBIN, POC: NEGATIVE
BLOOD URINE, POC: NEGATIVE
CLARITY, POC: CLEAR
COLOR, POC: YELLOW
GLUCOSE URINE, POC: NEGATIVE
KETONES, POC: NEGATIVE
LEUKOCYTE EST, POC: NEGATIVE
NITRITE, POC: NEGATIVE
PH, POC: 5
PROTEIN, POC: NEGATIVE
SPECIFIC GRAVITY, POC: 1.01
UROBILINOGEN, POC: 0.2

## 2018-06-13 PROCEDURE — 81002 URINALYSIS NONAUTO W/O SCOPE: CPT | Performed by: FAMILY MEDICINE

## 2018-06-13 PROCEDURE — 99244 OFF/OP CNSLTJ NEW/EST MOD 40: CPT | Performed by: FAMILY MEDICINE

## 2018-06-13 RX ORDER — CYCLOBENZAPRINE HCL 5 MG
5 TABLET ORAL EVERY 8 HOURS PRN
Qty: 21 TABLET | Refills: 0 | Status: SHIPPED | OUTPATIENT
Start: 2018-06-13 | End: 2018-10-04 | Stop reason: SDUPTHER

## 2018-06-13 ASSESSMENT — ENCOUNTER SYMPTOMS
NAUSEA: 0
WHEEZING: 0
SHORTNESS OF BREATH: 0
ABDOMINAL PAIN: 1
CHEST TIGHTNESS: 0
DIARRHEA: 0

## 2018-07-01 ENCOUNTER — HOSPITAL ENCOUNTER (OUTPATIENT)
Dept: OTHER | Age: 58
Discharge: HOME | End: 2018-07-31
Attending: FAMILY MEDICINE | Admitting: FAMILY MEDICINE

## 2018-09-27 DIAGNOSIS — M54.6 ACUTE LEFT-SIDED THORACIC BACK PAIN: ICD-10-CM

## 2018-09-27 RX ORDER — OMEPRAZOLE 20 MG/1
20 CAPSULE, DELAYED RELEASE ORAL DAILY
Qty: 90 CAPSULE | Refills: 1 | Status: SHIPPED | OUTPATIENT
Start: 2018-09-27 | End: 2019-01-30 | Stop reason: SDUPTHER

## 2018-09-27 RX ORDER — TIZANIDINE 2 MG/1
TABLET ORAL
Qty: 90 TABLET | Refills: 1 | OUTPATIENT
Start: 2018-09-27

## 2018-09-27 RX ORDER — LEVOTHYROXINE SODIUM 0.07 MG/1
75 TABLET ORAL DAILY
Qty: 90 TABLET | Refills: 1 | Status: SHIPPED | OUTPATIENT
Start: 2018-09-27 | End: 2019-01-30 | Stop reason: SDUPTHER

## 2018-09-27 NOTE — TELEPHONE ENCOUNTER
From: Gayathri Granados  Sent: 9/27/2018 3:36 PM EDT  Subject: Medication Renewal Request    Gayathri Granados would like a refill of the following medications:     tiZANidine (ZANAFLEX) 2 MG tablet Dani Parra MD]    Preferred pharmacy: [facility], [address] [place] [address] [phone] - [phone]    This message is being sent by Lyndsey Marion on behalf of Gayathri Granados

## 2018-10-04 ENCOUNTER — OFFICE VISIT (OUTPATIENT)
Dept: FAMILY MEDICINE CLINIC | Age: 58
End: 2018-10-04
Payer: MEDICAID

## 2018-10-04 VITALS
TEMPERATURE: 96.5 F | SYSTOLIC BLOOD PRESSURE: 106 MMHG | HEIGHT: 62 IN | HEART RATE: 69 BPM | BODY MASS INDEX: 30.36 KG/M2 | RESPIRATION RATE: 16 BRPM | WEIGHT: 165 LBS | OXYGEN SATURATION: 98 % | DIASTOLIC BLOOD PRESSURE: 70 MMHG

## 2018-10-04 DIAGNOSIS — F32.A DEPRESSION, UNSPECIFIED DEPRESSION TYPE: ICD-10-CM

## 2018-10-04 DIAGNOSIS — R42 DIZZINESS: ICD-10-CM

## 2018-10-04 DIAGNOSIS — G89.29 CHRONIC BILATERAL THORACIC BACK PAIN: ICD-10-CM

## 2018-10-04 DIAGNOSIS — N39.0 URINARY TRACT INFECTION WITHOUT HEMATURIA, SITE UNSPECIFIED: ICD-10-CM

## 2018-10-04 DIAGNOSIS — M54.6 CHRONIC BILATERAL THORACIC BACK PAIN: ICD-10-CM

## 2018-10-04 DIAGNOSIS — M19.90 OSTEOARTHRITIS, UNSPECIFIED OSTEOARTHRITIS TYPE, UNSPECIFIED SITE: ICD-10-CM

## 2018-10-04 DIAGNOSIS — Z23 FLU VACCINE NEED: ICD-10-CM

## 2018-10-04 DIAGNOSIS — G89.29 CHRONIC ABDOMINAL PAIN: Primary | ICD-10-CM

## 2018-10-04 DIAGNOSIS — R10.9 CHRONIC ABDOMINAL PAIN: Primary | ICD-10-CM

## 2018-10-04 DIAGNOSIS — M62.830 MUSCLE SPASM OF BACK: ICD-10-CM

## 2018-10-04 LAB
BILIRUBIN, POC: ABNORMAL
BLOOD URINE, POC: ABNORMAL
CLARITY, POC: ABNORMAL
COLOR, POC: YELLOW
GLUCOSE URINE, POC: ABNORMAL
KETONES, POC: ABNORMAL
LEUKOCYTE EST, POC: ABNORMAL
NITRITE, POC: POSITIVE
PH, POC: 6
PROTEIN, POC: ABNORMAL
SPECIFIC GRAVITY, POC: 1.02
UROBILINOGEN, POC: 0.2

## 2018-10-04 PROCEDURE — 90471 IMMUNIZATION ADMIN: CPT | Performed by: FAMILY MEDICINE

## 2018-10-04 PROCEDURE — 90686 IIV4 VACC NO PRSV 0.5 ML IM: CPT | Performed by: FAMILY MEDICINE

## 2018-10-04 PROCEDURE — 81002 URINALYSIS NONAUTO W/O SCOPE: CPT | Performed by: FAMILY MEDICINE

## 2018-10-04 PROCEDURE — 99214 OFFICE O/P EST MOD 30 MIN: CPT | Performed by: FAMILY MEDICINE

## 2018-10-04 RX ORDER — NITROFURANTOIN 25; 75 MG/1; MG/1
100 CAPSULE ORAL 2 TIMES DAILY
Qty: 20 CAPSULE | Refills: 0 | Status: SHIPPED | OUTPATIENT
Start: 2018-10-04 | End: 2018-10-14

## 2018-10-04 RX ORDER — ACETAMINOPHEN 500 MG
500 TABLET ORAL EVERY 6 HOURS PRN
Qty: 120 TABLET | Refills: 3 | Status: SHIPPED | OUTPATIENT
Start: 2018-10-04 | End: 2019-01-30 | Stop reason: SDUPTHER

## 2018-10-04 RX ORDER — CYCLOBENZAPRINE HCL 5 MG
5 TABLET ORAL EVERY 8 HOURS PRN
Qty: 30 TABLET | Refills: 2 | Status: SHIPPED | OUTPATIENT
Start: 2018-10-04 | End: 2019-01-30 | Stop reason: SDUPTHER

## 2018-10-04 RX ORDER — MECLIZINE HYDROCHLORIDE 25 MG/1
25 TABLET ORAL 3 TIMES DAILY PRN
Qty: 30 TABLET | Refills: 0 | Status: SHIPPED | OUTPATIENT
Start: 2018-10-04 | End: 2018-10-14

## 2018-10-04 ASSESSMENT — ENCOUNTER SYMPTOMS: ABDOMINAL PAIN: 1

## 2018-10-04 NOTE — PROGRESS NOTES
by bending. She has tried nothing for the symptoms. Depression   Stable with current med  Depression screening PHQ2 negative    Chronic back pain/muscle spasm  Usually in am, does not radiates  Tylenol and flexeril is helping. Review of Systems   Constitutional: Positive for chills and fatigue. Negative for diaphoresis, fever and weight loss. HENT: Negative for congestion and sore throat. Respiratory: Negative for cough. Cardiovascular: Negative for chest pain. Gastrointestinal: Positive for abdominal pain and constipation. Negative for anorexia, change in bowel habit, diarrhea, flatus, hematochezia, melena, nausea and vomiting. Genitourinary: Positive for dysuria, frequency, hesitancy and urgency. Negative for flank pain and hematuria. Musculoskeletal: Negative for arthralgias, joint swelling, myalgias and neck pain. Skin: Negative for rash. Neurological: Positive for dizziness and vertigo. Negative for weakness, numbness and headaches. has No Known Allergies.       Current Outpatient Prescriptions:     meclizine (ANTIVERT) 25 MG tablet, Take 1 tablet by mouth 3 times daily as needed for Dizziness, Disp: 30 tablet, Rfl: 0    cyclobenzaprine (FLEXERIL) 5 MG tablet, Take 1 tablet by mouth every 8 hours as needed for Muscle spasms, Disp: 30 tablet, Rfl: 2    acetaminophen (APAP EXTRA STRENGTH) 500 MG tablet, Take 1 tablet by mouth every 6 hours as needed for Pain, Disp: 120 tablet, Rfl: 3    nitrofurantoin, macrocrystal-monohydrate, (MACROBID) 100 MG capsule, Take 1 capsule by mouth 2 times daily for 10 days, Disp: 20 capsule, Rfl: 0    omeprazole (PRILOSEC) 20 MG delayed release capsule, Take 1 capsule by mouth daily for 365 doses, Disp: 90 capsule, Rfl: 1    levothyroxine (SYNTHROID) 75 MCG tablet, Take 1 tablet by mouth Daily, Disp: 90 tablet, Rfl: 1    albuterol sulfate HFA (PROVENTIL HFA) 108 (90 Base) MCG/ACT inhaler, Inhale 2 puffs into the lungs every 6 hours as needed for

## 2018-10-05 ASSESSMENT — ENCOUNTER SYMPTOMS
SORE THROAT: 0
DIARRHEA: 0
HEMATOCHEZIA: 0
NAUSEA: 0
VOMITING: 0
VISUAL CHANGE: 0
SWOLLEN GLANDS: 0
BELCHING: 0
CHANGE IN BOWEL HABIT: 0
FLATUS: 0
CONSTIPATION: 1
COUGH: 0

## 2018-10-05 ASSESSMENT — PATIENT HEALTH QUESTIONNAIRE - PHQ9
SUM OF ALL RESPONSES TO PHQ QUESTIONS 1-9: 2
SUM OF ALL RESPONSES TO PHQ9 QUESTIONS 1 & 2: 2
SUM OF ALL RESPONSES TO PHQ QUESTIONS 1-9: 2
1. LITTLE INTEREST OR PLEASURE IN DOING THINGS: 1
2. FEELING DOWN, DEPRESSED OR HOPELESS: 1

## 2018-10-06 LAB — URINE CULTURE, ROUTINE: NORMAL

## 2018-10-10 ENCOUNTER — TELEPHONE (OUTPATIENT)
Dept: FAMILY MEDICINE CLINIC | Age: 58
End: 2018-10-10

## 2019-01-28 ENCOUNTER — OFFICE VISIT (OUTPATIENT)
Dept: FAMILY MEDICINE CLINIC | Age: 59
End: 2019-01-28
Payer: MEDICAID

## 2019-01-28 VITALS
HEART RATE: 80 BPM | WEIGHT: 171.7 LBS | BODY MASS INDEX: 31.6 KG/M2 | DIASTOLIC BLOOD PRESSURE: 74 MMHG | RESPIRATION RATE: 16 BRPM | SYSTOLIC BLOOD PRESSURE: 110 MMHG | TEMPERATURE: 97.4 F | HEIGHT: 62 IN | OXYGEN SATURATION: 97 %

## 2019-01-28 DIAGNOSIS — R42 DIZZINESS: Primary | ICD-10-CM

## 2019-01-28 DIAGNOSIS — M54.42 BILATERAL LOW BACK PAIN WITH BILATERAL SCIATICA, UNSPECIFIED CHRONICITY: ICD-10-CM

## 2019-01-28 DIAGNOSIS — N39.3 STRESS INCONTINENCE: ICD-10-CM

## 2019-01-28 DIAGNOSIS — M54.41 BILATERAL LOW BACK PAIN WITH BILATERAL SCIATICA, UNSPECIFIED CHRONICITY: ICD-10-CM

## 2019-01-28 DIAGNOSIS — J45.20 MILD INTERMITTENT ASTHMA WITHOUT COMPLICATION: ICD-10-CM

## 2019-01-28 PROCEDURE — 99214 OFFICE O/P EST MOD 30 MIN: CPT | Performed by: FAMILY MEDICINE

## 2019-01-28 RX ORDER — NAPROXEN 375 MG/1
375 TABLET ORAL 2 TIMES DAILY PRN
Qty: 30 TABLET | Refills: 0 | Status: SHIPPED | OUTPATIENT
Start: 2019-01-28 | End: 2019-05-28 | Stop reason: ALTCHOICE

## 2019-01-28 ASSESSMENT — ENCOUNTER SYMPTOMS
SHORTNESS OF BREATH: 0
BOWEL INCONTINENCE: 0
BACK PAIN: 1
CHEST TIGHTNESS: 0
ABDOMINAL PAIN: 0

## 2019-01-30 DIAGNOSIS — M19.90 OSTEOARTHRITIS, UNSPECIFIED OSTEOARTHRITIS TYPE, UNSPECIFIED SITE: ICD-10-CM

## 2019-01-30 DIAGNOSIS — M54.6 ACUTE LEFT-SIDED THORACIC BACK PAIN: ICD-10-CM

## 2019-01-30 DIAGNOSIS — M62.830 MUSCLE SPASM OF BACK: ICD-10-CM

## 2019-01-30 DIAGNOSIS — M54.6 CHRONIC BILATERAL THORACIC BACK PAIN: ICD-10-CM

## 2019-01-30 DIAGNOSIS — G89.29 CHRONIC BILATERAL THORACIC BACK PAIN: ICD-10-CM

## 2019-01-30 DIAGNOSIS — L85.3 DRY SKIN DERMATITIS: ICD-10-CM

## 2019-01-30 RX ORDER — SERTRALINE HYDROCHLORIDE 100 MG/1
100 TABLET, FILM COATED ORAL DAILY
Qty: 60 TABLET | Refills: 3 | Status: SHIPPED | OUTPATIENT
Start: 2019-01-30 | End: 2019-05-28 | Stop reason: ALTCHOICE

## 2019-01-30 RX ORDER — ALBUTEROL SULFATE 90 UG/1
2 AEROSOL, METERED RESPIRATORY (INHALATION) EVERY 6 HOURS PRN
Qty: 1 INHALER | Refills: 5 | Status: SHIPPED | OUTPATIENT
Start: 2019-01-30 | End: 2019-09-10 | Stop reason: SDUPTHER

## 2019-01-30 RX ORDER — OMEPRAZOLE 20 MG/1
20 CAPSULE, DELAYED RELEASE ORAL DAILY
Qty: 90 CAPSULE | Refills: 1 | Status: SHIPPED | OUTPATIENT
Start: 2019-01-30 | End: 2019-05-20 | Stop reason: SDUPTHER

## 2019-01-30 RX ORDER — LEVOTHYROXINE SODIUM 0.07 MG/1
75 TABLET ORAL DAILY
Qty: 90 TABLET | Refills: 1 | Status: SHIPPED | OUTPATIENT
Start: 2019-01-30 | End: 2019-05-20 | Stop reason: SDUPTHER

## 2019-01-30 RX ORDER — AMITRIPTYLINE HYDROCHLORIDE 10 MG/1
10 TABLET, FILM COATED ORAL NIGHTLY
Qty: 30 TABLET | Refills: 4 | Status: SHIPPED | OUTPATIENT
Start: 2019-01-30 | End: 2019-05-28

## 2019-01-30 RX ORDER — CYCLOBENZAPRINE HCL 5 MG
5 TABLET ORAL EVERY 8 HOURS PRN
Qty: 30 TABLET | Refills: 2 | Status: SHIPPED | OUTPATIENT
Start: 2019-01-30 | End: 2019-03-22

## 2019-01-30 RX ORDER — CHOLECALCIFEROL (VITAMIN D3) 50 MCG
2000 TABLET ORAL DAILY
Qty: 30 TABLET | Refills: 11 | Status: SHIPPED | OUTPATIENT
Start: 2019-01-30 | End: 2020-09-24 | Stop reason: SDUPTHER

## 2019-01-30 RX ORDER — ACETAMINOPHEN 500 MG
500 TABLET ORAL EVERY 6 HOURS PRN
Qty: 120 TABLET | Refills: 3 | Status: SHIPPED | OUTPATIENT
Start: 2019-01-30 | End: 2019-05-20 | Stop reason: SDUPTHER

## 2019-01-31 RX ORDER — ERGOCALCIFEROL 1.25 MG/1
50000 CAPSULE ORAL WEEKLY
Qty: 4 CAPSULE | Refills: 2 | Status: SHIPPED | OUTPATIENT
Start: 2019-01-31 | End: 2019-05-20 | Stop reason: SDUPTHER

## 2019-02-22 ENCOUNTER — OFFICE VISIT (OUTPATIENT)
Dept: ENT CLINIC | Age: 59
End: 2019-02-22
Payer: MEDICAID

## 2019-02-22 VITALS — OXYGEN SATURATION: 97 % | SYSTOLIC BLOOD PRESSURE: 124 MMHG | HEART RATE: 75 BPM | DIASTOLIC BLOOD PRESSURE: 78 MMHG

## 2019-02-22 DIAGNOSIS — H81.02 LABYRINTHINE VERTIGO WITH INVOLVEMENT OF LEFT INNER EAR: Primary | ICD-10-CM

## 2019-02-22 PROCEDURE — 99203 OFFICE O/P NEW LOW 30 MIN: CPT | Performed by: OTOLARYNGOLOGY

## 2019-02-22 ASSESSMENT — ENCOUNTER SYMPTOMS
FACIAL SWELLING: 0
ALLERGIC/IMMUNOLOGIC NEGATIVE: 1
RHINORRHEA: 0
VOICE CHANGE: 0
RESPIRATORY NEGATIVE: 1
SINUS PRESSURE: 0
EYES NEGATIVE: 1
TROUBLE SWALLOWING: 0
SORE THROAT: 0
SINUS PAIN: 0

## 2019-03-15 ENCOUNTER — PROCEDURE VISIT (OUTPATIENT)
Dept: AUDIOLOGY | Age: 59
End: 2019-03-15
Payer: MEDICAID

## 2019-03-15 ENCOUNTER — TELEPHONE (OUTPATIENT)
Dept: ENT CLINIC | Age: 59
End: 2019-03-15

## 2019-03-15 DIAGNOSIS — H81.4 VERTIGO OF CENTRAL ORIGIN, UNSPECIFIED LATERALITY: Primary | ICD-10-CM

## 2019-03-15 DIAGNOSIS — R42 DIZZINESS: Primary | ICD-10-CM

## 2019-03-15 DIAGNOSIS — H90.3 SENSORINEURAL HEARING LOSS OF BOTH EARS: Primary | ICD-10-CM

## 2019-03-15 PROCEDURE — 92545 OSCILLATING TRACKING TEST: CPT | Performed by: AUDIOLOGIST

## 2019-03-15 PROCEDURE — 92557 COMPREHENSIVE HEARING TEST: CPT | Performed by: AUDIOLOGIST

## 2019-03-15 PROCEDURE — 92542 POSITIONAL NYSTAGMUS TEST: CPT | Performed by: AUDIOLOGIST

## 2019-03-15 PROCEDURE — 92541 SPONTANEOUS NYSTAGMUS TEST: CPT | Performed by: AUDIOLOGIST

## 2019-03-15 PROCEDURE — 92550 TYMPANOMETRY & REFLEX THRESH: CPT | Performed by: AUDIOLOGIST

## 2019-03-15 PROCEDURE — 92537 CALORIC VSTBLR TEST W/REC: CPT | Performed by: AUDIOLOGIST

## 2019-03-22 ENCOUNTER — APPOINTMENT (OUTPATIENT)
Dept: CT IMAGING | Age: 59
End: 2019-03-22
Payer: MEDICAID

## 2019-03-22 ENCOUNTER — HOSPITAL ENCOUNTER (EMERGENCY)
Age: 59
Discharge: HOME | End: 2019-03-22
Payer: MEDICAID

## 2019-03-22 VITALS
DIASTOLIC BLOOD PRESSURE: 67 MMHG | HEIGHT: 64 IN | OXYGEN SATURATION: 100 % | HEART RATE: 71 BPM | TEMPERATURE: 97.7 F | SYSTOLIC BLOOD PRESSURE: 124 MMHG | BODY MASS INDEX: 29.47 KG/M2 | RESPIRATION RATE: 18 BRPM

## 2019-03-22 DIAGNOSIS — M54.50 LUMBAR PAIN: Primary | ICD-10-CM

## 2019-03-22 DIAGNOSIS — R30.0 DYSURIA: ICD-10-CM

## 2019-03-22 LAB
BILIRUBIN URINE: NEGATIVE
BLOOD, URINE: NEGATIVE
CLARITY: CLEAR
COLOR: YELLOW
EPITHELIAL CELLS, UA: 0 /HPF (ref 0–5)
GLUCOSE URINE: NEGATIVE MG/DL
HYALINE CASTS: 0 /LPF (ref 0–8)
KETONES, URINE: NEGATIVE MG/DL
LEUKOCYTE ESTERASE, URINE: ABNORMAL
MICROSCOPIC EXAMINATION: YES
NITRITE, URINE: NEGATIVE
PH UA: 6.5 (ref 5–8)
PROTEIN UA: NEGATIVE MG/DL
RBC UA: 0 /HPF (ref 0–4)
SPECIFIC GRAVITY UA: 1.01 (ref 1–1.03)
URINE REFLEX TO CULTURE: YES
URINE TYPE: ABNORMAL
UROBILINOGEN, URINE: 0.2 E.U./DL
WBC UA: 2 /HPF (ref 0–5)

## 2019-03-22 PROCEDURE — 6370000000 HC RX 637 (ALT 250 FOR IP): Performed by: PHYSICIAN ASSISTANT

## 2019-03-22 PROCEDURE — 72131 CT LUMBAR SPINE W/O DYE: CPT

## 2019-03-22 PROCEDURE — 87086 URINE CULTURE/COLONY COUNT: CPT

## 2019-03-22 PROCEDURE — 99283 EMERGENCY DEPT VISIT LOW MDM: CPT

## 2019-03-22 PROCEDURE — 6370000000 HC RX 637 (ALT 250 FOR IP)

## 2019-03-22 PROCEDURE — 81001 URINALYSIS AUTO W/SCOPE: CPT

## 2019-03-22 PROCEDURE — 96372 THER/PROPH/DIAG INJ SC/IM: CPT

## 2019-03-22 PROCEDURE — 6360000002 HC RX W HCPCS: Performed by: PHYSICIAN ASSISTANT

## 2019-03-22 RX ORDER — CYCLOBENZAPRINE HCL 10 MG
10 TABLET ORAL ONCE
Status: COMPLETED | OUTPATIENT
Start: 2019-03-22 | End: 2019-03-22

## 2019-03-22 RX ORDER — HYDROCODONE BITARTRATE AND ACETAMINOPHEN 5; 325 MG/1; MG/1
TABLET ORAL
Status: COMPLETED
Start: 2019-03-22 | End: 2019-03-22

## 2019-03-22 RX ORDER — CEFUROXIME AXETIL 250 MG/1
250 TABLET ORAL 2 TIMES DAILY
Qty: 14 TABLET | Refills: 0 | Status: SHIPPED | OUTPATIENT
Start: 2019-03-22 | End: 2019-03-29

## 2019-03-22 RX ORDER — HYDROCODONE BITARTRATE AND ACETAMINOPHEN 5; 325 MG/1; MG/1
1 TABLET ORAL ONCE
Status: COMPLETED | OUTPATIENT
Start: 2019-03-22 | End: 2019-03-22

## 2019-03-22 RX ORDER — IBUPROFEN 800 MG/1
800 TABLET ORAL EVERY 6 HOURS PRN
Qty: 20 TABLET | Refills: 0 | Status: SHIPPED | OUTPATIENT
Start: 2019-03-22 | End: 2019-09-10 | Stop reason: SDUPTHER

## 2019-03-22 RX ORDER — KETOROLAC TROMETHAMINE 30 MG/ML
30 INJECTION, SOLUTION INTRAMUSCULAR; INTRAVENOUS ONCE
Status: COMPLETED | OUTPATIENT
Start: 2019-03-22 | End: 2019-03-22

## 2019-03-22 RX ADMIN — HYDROCODONE BITARTRATE AND ACETAMINOPHEN 1 TABLET: 5; 325 TABLET ORAL at 17:55

## 2019-03-22 RX ADMIN — CYCLOBENZAPRINE HYDROCHLORIDE 10 MG: 10 TABLET, FILM COATED ORAL at 16:10

## 2019-03-22 RX ADMIN — KETOROLAC TROMETHAMINE 30 MG: 30 INJECTION, SOLUTION INTRAMUSCULAR at 16:10

## 2019-03-22 ASSESSMENT — PAIN DESCRIPTION - PAIN TYPE
TYPE: ACUTE PAIN

## 2019-03-22 ASSESSMENT — PAIN DESCRIPTION - LOCATION
LOCATION: BACK

## 2019-03-22 ASSESSMENT — PAIN DESCRIPTION - ORIENTATION
ORIENTATION: LEFT
ORIENTATION: MID;LEFT

## 2019-03-22 ASSESSMENT — PAIN DESCRIPTION - FREQUENCY: FREQUENCY: CONTINUOUS

## 2019-03-22 ASSESSMENT — PAIN DESCRIPTION - PROGRESSION: CLINICAL_PROGRESSION: NOT CHANGED

## 2019-03-22 ASSESSMENT — PAIN SCALES - GENERAL
PAINLEVEL_OUTOF10: 6
PAINLEVEL_OUTOF10: 8

## 2019-03-22 ASSESSMENT — PAIN DESCRIPTION - DESCRIPTORS: DESCRIPTORS: SHARP

## 2019-03-24 LAB — URINE CULTURE, ROUTINE: NORMAL

## 2019-05-20 DIAGNOSIS — M54.6 CHRONIC BILATERAL THORACIC BACK PAIN: ICD-10-CM

## 2019-05-20 DIAGNOSIS — M19.90 OSTEOARTHRITIS, UNSPECIFIED OSTEOARTHRITIS TYPE, UNSPECIFIED SITE: ICD-10-CM

## 2019-05-20 DIAGNOSIS — F51.04 CHRONIC INSOMNIA: ICD-10-CM

## 2019-05-20 DIAGNOSIS — M54.6 ACUTE LEFT-SIDED THORACIC BACK PAIN: ICD-10-CM

## 2019-05-20 DIAGNOSIS — M62.830 MUSCLE SPASM OF BACK: ICD-10-CM

## 2019-05-20 DIAGNOSIS — G89.29 CHRONIC BILATERAL THORACIC BACK PAIN: ICD-10-CM

## 2019-05-20 DIAGNOSIS — E53.9 BURNING FEET SYNDROME: ICD-10-CM

## 2019-05-20 DIAGNOSIS — N39.3 STRESS INCONTINENCE: ICD-10-CM

## 2019-05-20 RX ORDER — ACETAMINOPHEN 500 MG
500 TABLET ORAL EVERY 6 HOURS PRN
Qty: 120 TABLET | Refills: 3 | Status: SHIPPED | OUTPATIENT
Start: 2019-05-20 | End: 2019-10-11 | Stop reason: SDUPTHER

## 2019-05-20 RX ORDER — TRAZODONE HYDROCHLORIDE 50 MG/1
50 TABLET ORAL NIGHTLY PRN
Qty: 30 TABLET | Refills: 5 | Status: SHIPPED | OUTPATIENT
Start: 2019-05-20 | End: 2019-09-10 | Stop reason: SDUPTHER

## 2019-05-20 RX ORDER — OMEPRAZOLE 20 MG/1
20 CAPSULE, DELAYED RELEASE ORAL DAILY
Qty: 90 CAPSULE | Refills: 1 | Status: SHIPPED | OUTPATIENT
Start: 2019-05-20 | End: 2019-09-10 | Stop reason: SDUPTHER

## 2019-05-20 RX ORDER — LEVOTHYROXINE SODIUM 0.07 MG/1
75 TABLET ORAL DAILY
Qty: 90 TABLET | Refills: 1 | Status: SHIPPED | OUTPATIENT
Start: 2019-05-20 | End: 2019-08-21 | Stop reason: ALTCHOICE

## 2019-05-20 RX ORDER — GABAPENTIN 100 MG/1
100 CAPSULE ORAL 3 TIMES DAILY
Qty: 90 CAPSULE | Refills: 3 | Status: SHIPPED | OUTPATIENT
Start: 2019-05-20 | End: 2019-09-10 | Stop reason: SDUPTHER

## 2019-05-24 RX ORDER — ERGOCALCIFEROL 1.25 MG/1
50000 CAPSULE ORAL WEEKLY
Qty: 4 CAPSULE | Refills: 2 | Status: SHIPPED | OUTPATIENT
Start: 2019-05-24 | End: 2020-03-10 | Stop reason: ALTCHOICE

## 2019-05-28 ENCOUNTER — OFFICE VISIT (OUTPATIENT)
Dept: FAMILY MEDICINE CLINIC | Age: 59
End: 2019-05-28
Payer: MEDICAID

## 2019-05-28 VITALS
OXYGEN SATURATION: 95 % | HEIGHT: 64 IN | WEIGHT: 168.7 LBS | RESPIRATION RATE: 16 BRPM | DIASTOLIC BLOOD PRESSURE: 72 MMHG | TEMPERATURE: 98.5 F | SYSTOLIC BLOOD PRESSURE: 112 MMHG | HEART RATE: 88 BPM | BODY MASS INDEX: 28.8 KG/M2

## 2019-05-28 DIAGNOSIS — R73.03 PRE-DIABETES: Primary | ICD-10-CM

## 2019-05-28 DIAGNOSIS — M19.90 OSTEOARTHRITIS, UNSPECIFIED OSTEOARTHRITIS TYPE, UNSPECIFIED SITE: ICD-10-CM

## 2019-05-28 DIAGNOSIS — E78.5 HYPERLIPIDEMIA, UNSPECIFIED HYPERLIPIDEMIA TYPE: ICD-10-CM

## 2019-05-28 DIAGNOSIS — Z12.39 SCREENING FOR BREAST CANCER: ICD-10-CM

## 2019-05-28 DIAGNOSIS — Z01.419 ENCOUNTER FOR GYNECOLOGICAL EXAMINATION WITHOUT ABNORMAL FINDING: ICD-10-CM

## 2019-05-28 PROCEDURE — 99214 OFFICE O/P EST MOD 30 MIN: CPT | Performed by: FAMILY MEDICINE

## 2019-05-28 RX ORDER — POLYETHYLENE GLYCOL 3350 17 G/17G
17 POWDER, FOR SOLUTION ORAL DAILY
Qty: 1530 G | Refills: 1 | Status: SHIPPED | OUTPATIENT
Start: 2019-05-28 | End: 2019-06-27

## 2019-05-28 ASSESSMENT — PATIENT HEALTH QUESTIONNAIRE - PHQ9
SUM OF ALL RESPONSES TO PHQ QUESTIONS 1-9: 0
2. FEELING DOWN, DEPRESSED OR HOPELESS: 0
SUM OF ALL RESPONSES TO PHQ QUESTIONS 1-9: 0
1. LITTLE INTEREST OR PLEASURE IN DOING THINGS: 0
SUM OF ALL RESPONSES TO PHQ9 QUESTIONS 1 & 2: 0

## 2019-05-28 ASSESSMENT — ENCOUNTER SYMPTOMS
CHANGE IN BOWEL HABIT: 0
SWOLLEN GLANDS: 0
NAUSEA: 0
VISUAL CHANGE: 0
ABDOMINAL PAIN: 0
VOMITING: 0
SORE THROAT: 0

## 2019-05-28 NOTE — PROGRESS NOTES
Subjective:      Patient ID: Reta Rosales is a 63 y.o. female. Here for fu      Other   This is a chronic (PRE DM) problem. The current episode started more than 1 month ago. The problem occurs constantly. The problem has been waxing and waning. Pertinent negatives include no abdominal pain, anorexia, change in bowel habit, chest pain, chills, diaphoresis, fatigue, fever, headaches, myalgias, nausea, neck pain, numbness, rash, sore throat, swollen glands, urinary symptoms, vertigo, visual change, vomiting or weakness. Nothing aggravates the symptoms. She has tried nothing for the symptoms. Hyperlipidemia  patient on statin complaint with medications, no myalgias, arthralgias, indigestion or rash, following diet and exercise as recommended. Hypothyroidism:  Now on replacement therapy, denies any fatigue, slow thought process, tremor, hair loss, diaphoresis, heat/cold intolerance, wt gain/loss, diarrhea/constipation. Review of Systems   Constitutional: Negative for chills, diaphoresis, fatigue and fever. HENT: Negative for sore throat. Eyes: Negative for visual disturbance. Cardiovascular: Negative for chest pain. Gastrointestinal: Negative for abdominal pain, anorexia, change in bowel habit, nausea and vomiting. Endocrine: Negative for polydipsia and polyphagia. Musculoskeletal: Negative for myalgias and neck pain. Skin: Negative for rash. Allergic/Immunologic: Negative for immunocompromised state. Neurological: Negative for vertigo, facial asymmetry, weakness, numbness and headaches. Psychiatric/Behavioral: Negative for behavioral problems and sleep disturbance. The patient is not nervous/anxious. has No Known Allergies.       Current Outpatient Medications:     polyethylene glycol (GLYCOLAX) powder, Take 17 g by mouth daily, Disp: 1530 g, Rfl: 1    vitamin D (ERGOCALCIFEROL) 97998 units CAPS capsule, Take 1 capsule by mouth once a week, Disp: 4 capsule, Rfl: 2   gabapentin (NEURONTIN) 100 MG capsule, Take 1 capsule by mouth 3 times daily for 90 days. , Disp: 90 capsule, Rfl: 3    traZODone (DESYREL) 50 MG tablet, Take 1 tablet by mouth nightly as needed for Sleep, Disp: 30 tablet, Rfl: 5    Diapers & Supplies MISC, 1 each by Does not apply route daily, Disp: 100 each, Rfl: 3    omeprazole (PRILOSEC) 20 MG delayed release capsule, Take 1 capsule by mouth daily for 365 doses, Disp: 90 capsule, Rfl: 1    levothyroxine (SYNTHROID) 75 MCG tablet, Take 1 tablet by mouth Daily, Disp: 90 tablet, Rfl: 1    acetaminophen (APAP EXTRA STRENGTH) 500 MG tablet, Take 1 tablet by mouth every 6 hours as needed for Pain, Disp: 120 tablet, Rfl: 3    ibuprofen (IBU) 800 MG tablet, Take 1 tablet by mouth every 6 hours as needed for Pain, Disp: 20 tablet, Rfl: 0    mineral oil-hydrophilic petrolatum (AQUAPHOR) ointment, Apply topically as needed. , Disp: 99 g, Rfl: 1    albuterol sulfate HFA (PROVENTIL HFA) 108 (90 Base) MCG/ACT inhaler, Inhale 2 puffs into the lungs every 6 hours as needed for Wheezing or Shortness of Breath (and/or persistent cough), Disp: 1 Inhaler, Rfl: 5    Cholecalciferol (VITAMIN D) 2000 units TABS tablet, Take 1 tablet by mouth daily After finishing 12 week couse, Disp: 30 tablet, Rfl: 11    diclofenac sodium (VOLTAREN) 1 % GEL, Apply 2 grams in affected joints 2-3 times a day., Disp: 2 Tube, Rfl: 5    clotrimazole-betamethasone (LOTRISONE) 1-0.05 % cream, Apply topically 2 times daily. , Disp: 1 Tube, Rfl: 0    acetaminophen (APAP EXTRA STRENGTH) 500 MG tablet, Tab 2 once a day, Disp: 120 tablet, Rfl: 3    Elastic Bandages & Supports (CVS LUMBAR/BACK SUPPORT BRACE) MISC, 1 applicator by Does not apply route daily, Disp: 1 each, Rfl: 0    tiZANidine (ZANAFLEX) 2 MG tablet, Take 1 tab po at night., Disp: 90 tablet, Rfl: 1     has a past medical history of Arthritis, Asthma, Chronic bilateral thoracic back pain, GERD (gastroesophageal reflux disease), Intractable chronic migraine without aura and without status migrainosus, Primary osteoarthritis of both hands, and Thyroid disease. No past surgical history on file. reports that she has never smoked. She has never used smokeless tobacco. She reports that she does not drink alcohol or use drugs. Family history is unknown by patient. Objective:  Blood pressure 112/72, pulse 88, temperature 98.5 °F (36.9 °C), temperature source Tympanic, resp. rate 16, height 5' 4\" (1.626 m), weight 168 lb 11.2 oz (76.5 kg), SpO2 95 %, not currently breastfeeding. Physical Exam   Constitutional: She is oriented to person, place, and time. She appears well-developed and well-nourished. No distress. HENT:   Head: Normocephalic and atraumatic. Right Ear: External ear normal.   Left Ear: External ear normal.   Nose: Nose normal.   Mouth/Throat: Oropharynx is clear and moist. No oropharyngeal exudate. Eyes: Pupils are equal, round, and reactive to light. Conjunctivae and EOM are normal. Right eye exhibits no discharge. Left eye exhibits no discharge. No scleral icterus. Neck: Normal range of motion. Neck supple. No JVD present. No tracheal deviation present. No thyromegaly present. Cardiovascular: Normal rate, regular rhythm, normal heart sounds and intact distal pulses. Exam reveals no gallop and no friction rub. No murmur heard. Pulmonary/Chest: Effort normal and breath sounds normal. No respiratory distress. She has no wheezes. She has no rales. She exhibits no tenderness. Abdominal: Soft. Bowel sounds are normal. She exhibits mass (mild ruq ttp). She exhibits no distension. There is no tenderness. There is no rebound and no guarding. Musculoskeletal: Normal range of motion. She exhibits no edema or tenderness. Lymphadenopathy:     She has no cervical adenopathy. Neurological: She is alert and oriented to person, place, and time. She has normal reflexes. No cranial nerve deficit.  She exhibits normal muscle tone. Coordination normal.   Skin: Skin is warm. Capillary refill takes less than 2 seconds. No rash noted. She is not diaphoretic. No erythema. No pallor. Psychiatric: She has a normal mood and affect. Her behavior is normal. Judgment and thought content normal.   Nursing note and vitals reviewed. Assessment:       Diagnosis Orders   1. Pre-diabetes  BASIC METABOLIC PANEL    Hemoglobin A1C   2. Hyperlipidemia, unspecified hyperlipidemia type  Lipid Panel   3. Osteoarthritis, unspecified osteoarthritis type, unspecified site     4. Screening for breast cancer  YJOTI DIGITAL SCREEN W CAD BILATERAL   5. Encounter for gynecological examination without abnormal finding  Nia Colunga MD, Gynecology, [facility]           Plan:      1. Unchanged  Poor adherence to diet  Check bmp and a1c    2. Check lipid panel  encourage low fat diet    3. Uncontrolled  Continue prn nsaid and gabapentin    4. Referral    5.  Referral for pap             Dari Small MD

## 2019-05-28 NOTE — LETTER
[address] [place] [address]  [facility]  Phone: [phone]  Fax: [phone]    Mauricio Evans MD        June 7, 2019    Rick Fiore  [address] [address]      Dear Oh Reynolds: Our office has been to reach you concerning lab results. Please give a call at your earliest convenience.       Sincerely,        Mauricio Evans MD

## 2019-05-31 LAB
ANION GAP SERPL CALCULATED.3IONS-SCNC: 12 MMOL/L (ref 3–16)
BUN BLDV-MCNC: 15 MG/DL (ref 7–20)
CALCIUM SERPL-MCNC: 9.6 MG/DL (ref 8.3–10.6)
CHLORIDE BLD-SCNC: 102 MMOL/L (ref 99–110)
CHOLESTEROL, TOTAL: 185 MG/DL (ref 0–199)
CO2: 25 MMOL/L (ref 21–32)
CREAT SERPL-MCNC: 0.6 MG/DL (ref 0.6–1.1)
GFR AFRICAN AMERICAN: >60
GFR NON-AFRICAN AMERICAN: >60
GLUCOSE BLD-MCNC: 101 MG/DL (ref 70–99)
HDLC SERPL-MCNC: 42 MG/DL (ref 40–60)
LDL CHOLESTEROL CALCULATED: 119 MG/DL
POTASSIUM SERPL-SCNC: 4.8 MMOL/L (ref 3.5–5.1)
SODIUM BLD-SCNC: 139 MMOL/L (ref 136–145)
TRIGL SERPL-MCNC: 121 MG/DL (ref 0–150)
VLDLC SERPL CALC-MCNC: 24 MG/DL

## 2019-06-01 LAB
ESTIMATED AVERAGE GLUCOSE: 134.1 MG/DL
HBA1C MFR BLD: 6.3 %

## 2019-06-21 ENCOUNTER — HOSPITAL ENCOUNTER (OUTPATIENT)
Dept: WOMENS IMAGING | Age: 59
Discharge: HOME | End: 2019-06-21
Payer: MEDICAID

## 2019-06-21 DIAGNOSIS — Z12.39 SCREENING FOR BREAST CANCER: ICD-10-CM

## 2019-06-21 PROCEDURE — 77063 BREAST TOMOSYNTHESIS BI: CPT

## 2019-07-11 ENCOUNTER — OFFICE VISIT (OUTPATIENT)
Dept: GYNECOLOGY | Age: 59
End: 2019-07-11
Payer: MEDICAID

## 2019-07-11 VITALS
RESPIRATION RATE: 16 BRPM | HEART RATE: 81 BPM | WEIGHT: 168.6 LBS | SYSTOLIC BLOOD PRESSURE: 114 MMHG | DIASTOLIC BLOOD PRESSURE: 80 MMHG | BODY MASS INDEX: 28.79 KG/M2 | OXYGEN SATURATION: 97 % | HEIGHT: 64 IN | TEMPERATURE: 98 F

## 2019-07-11 DIAGNOSIS — B37.31 CANDIDIASIS OF VULVA AND VAGINA: ICD-10-CM

## 2019-07-11 DIAGNOSIS — R10.84 GENERALIZED ABDOMINAL PAIN: ICD-10-CM

## 2019-07-11 DIAGNOSIS — Z01.419 WELL WOMAN EXAM WITH ROUTINE GYNECOLOGICAL EXAM: Primary | ICD-10-CM

## 2019-07-11 LAB
BILIRUBIN, POC: NEGATIVE
BLOOD URINE, POC: NEGATIVE
CLARITY, POC: CLEAR
COLOR, POC: YELLOW
GLUCOSE URINE, POC: NEGATIVE
KETONES, POC: NEGATIVE
LEUKOCYTE EST, POC: NEGATIVE
NITRITE, POC: NEGATIVE
PH, POC: 6
PROTEIN, POC: NEGATIVE
SPECIFIC GRAVITY, POC: 1.02
UROBILINOGEN, POC: 0.2

## 2019-07-11 PROCEDURE — 99386 PREV VISIT NEW AGE 40-64: CPT | Performed by: OBSTETRICS & GYNECOLOGY

## 2019-07-11 PROCEDURE — 81002 URINALYSIS NONAUTO W/O SCOPE: CPT | Performed by: OBSTETRICS & GYNECOLOGY

## 2019-07-11 RX ORDER — FLUCONAZOLE 150 MG/1
150 TABLET ORAL
Qty: 2 TABLET | Refills: 0 | Status: SHIPPED | OUTPATIENT
Start: 2019-07-11 | End: 2019-07-15

## 2019-07-11 RX ORDER — NYSTATIN 100000 U/G
CREAM TOPICAL
Qty: 30 G | Refills: 0 | Status: SHIPPED | OUTPATIENT
Start: 2019-07-11 | End: 2019-09-10 | Stop reason: ALTCHOICE

## 2019-07-11 ASSESSMENT — ENCOUNTER SYMPTOMS
APNEA: 0
ABDOMINAL PAIN: 1
NAUSEA: 0
ANAL BLEEDING: 0
BLOOD IN STOOL: 0
SORE THROAT: 0
DIARRHEA: 0
COLOR CHANGE: 0
TROUBLE SWALLOWING: 0
WHEEZING: 0
PHOTOPHOBIA: 0
SHORTNESS OF BREATH: 0
BACK PAIN: 0
CHEST TIGHTNESS: 0
CONSTIPATION: 0
RECTAL PAIN: 0
VOMITING: 0
ABDOMINAL DISTENTION: 0
COUGH: 0

## 2019-07-11 NOTE — PROGRESS NOTES
Annual GYN Visit    Luzmaria Erickson  Date ofBirth:  1960    Date of Service:  2019     [place]uxem[place]ish speaking patient     Chief Complaint:   Luzmaria Erickson is a 63 y.o.   female who presents for routine annual gynecologic visit, abdominal pain and vulvar itching and irritation     HPI:  Patient is postmenopausal- she is here for routine annual exam, reports one week history of vulvar irritation and painful urination, she is not sexually active. She denies postmenopausal bleeding. She also reports 1-2 year history of abdominal pain off and on, but now lower abdominal pain x 2 weeks associated with abdominal distension. She denies nausea, no vomiting, no other GI/ symptoms, no recent travel, no fevers    Health Maintenance   Topic Date Due    Cervical cancer screen  1982    Shingles Vaccine (2 of 2) 2018    Flu vaccine (1) 2019    A1C test (Diabetic or Prediabetic)  2020    Breast cancer screen  2021    Lipid screen  2024    Colon cancer screen colonoscopy  2024    DTaP/Tdap/Td vaccine (2 - Td) 2025    Pneumococcal 0-64 years Vaccine  Completed    Hepatitis C screen  Completed    HIV screen  Completed       Past Medical History:   Diagnosis Date    Arthritis     Asthma     Chronic bilateral thoracic back pain 2018    GERD (gastroesophageal reflux disease)     Intractable chronic migraine without aura and without status migrainosus 2016    Primary osteoarthritis of both hands 10/20/2015    Thyroid disease      No past surgical history on file. OB History   No data available     Social History     Socioeconomic History    Marital status:       Spouse name: Not on file    Number of children: Not on file    Years of education: Not on file    Highest education level: Not on file   Occupational History    Not on file   Social Needs    Financial resource strain: Not on file    Food insecurity:     Worry: Not on file Inability: Not on file    Transportation needs:     Medical: Not on file     Non-medical: Not on file   Tobacco Use    Smoking status: Never Smoker    Smokeless tobacco: Never Used   Substance and Sexual Activity    Alcohol use: No     Alcohol/week: 0.0 oz    Drug use: No    Sexual activity: Never   Lifestyle    Physical activity:     Days per week: Not on file     Minutes per session: Not on file    Stress: Not on file   Relationships    Social connections:     Talks on phone: Not on file     Gets together: Not on file     Attends [employer] service: Not on file     Active member of club or organization: Not on file     Attends meetings of clubs or organizations: Not on file     Relationship status: Not on file    Intimate partner violence:     Fear of current or ex partner: Not on file     Emotionally abused: Not on file     Physically abused: Not on file     Forced sexual activity: Not on file   Other Topics Concern    Not on file   Social History Narrative    Not on file     No Known Allergies  Outpatient Medications Marked as Taking for the 7/11/19 encounter (Office Visit) with Kendrick Mccoy MD   Medication Sig Dispense Refill    fluconazole (DIFLUCAN) 150 MG tablet Take 1 tablet by mouth every 72 hours for 4 days 2 tablet 0    nystatin (MYCOSTATIN) 263974 UNIT/GM cream Apply topically 2 times daily. 30 g 0    vitamin D (ERGOCALCIFEROL) 73317 units CAPS capsule Take 1 capsule by mouth once a week 4 capsule 2    gabapentin (NEURONTIN) 100 MG capsule Take 1 capsule by mouth 3 times daily for 90 days.  90 capsule 3    traZODone (DESYREL) 50 MG tablet Take 1 tablet by mouth nightly as needed for Sleep 30 tablet 5    omeprazole (PRILOSEC) 20 MG delayed release capsule Take 1 capsule by mouth daily for 365 doses 90 capsule 1    levothyroxine (SYNTHROID) 75 MCG tablet Take 1 tablet by mouth Daily 90 tablet 1    acetaminophen (APAP EXTRA STRENGTH) 500 MG tablet Take 1 tablet by mouth every 6 hours as needed for Pain 120 tablet 3    mineral oil-hydrophilic petrolatum (AQUAPHOR) ointment Apply topically as needed. 99 g 1    albuterol sulfate HFA (PROVENTIL HFA) 108 (90 Base) MCG/ACT inhaler Inhale 2 puffs into the lungs every 6 hours as needed for Wheezing or Shortness of Breath (and/or persistent cough) 1 Inhaler 5    Cholecalciferol (VITAMIN D) 2000 units TABS tablet Take 1 tablet by mouth daily After finishing 12 week couse 30 tablet 11    acetaminophen (APAP EXTRA STRENGTH) 500 MG tablet Tab 2 once a day 120 tablet 3    Elastic Bandages & Supports ([employer] LUMBAR/BACK SUPPORT BRACE) MISC 1 applicator by Does not apply route daily 1 each 0    tiZANidine (ZANAFLEX) 2 MG tablet Take 1 tab po at night. 90 tablet 1     Family History   Family history unknown: Yes         Review of Systems:  Review of Systems   Constitutional: Negative for appetite change, chills, fatigue, fever and unexpected weight change. HENT: Negative for ear pain, hearing loss, mouth sores, sore throat and trouble swallowing. Eyes: Negative for photophobia and visual disturbance. Respiratory: Negative for apnea, cough, chest tightness, shortness of breath and wheezing. Cardiovascular: Negative for chest pain, palpitations and leg swelling. Gastrointestinal: Positive for abdominal pain. Negative for abdominal distention, anal bleeding, blood in stool, constipation, diarrhea, nausea, rectal pain and vomiting. Endocrine: Negative for cold intolerance, heat intolerance, polydipsia, polyphagia and polyuria. Genitourinary: Positive for dysuria. Negative for difficulty urinating, dyspareunia, enuresis, frequency, genital sores, hematuria, menstrual problem, pelvic pain, urgency, vaginal bleeding, vaginal discharge (vulvar irritation ) and vaginal pain. Musculoskeletal: Negative for arthralgias, back pain, joint swelling and myalgias. Skin: Negative for color change and rash.    Neurological: Negative for dizziness, seizures, syncope, light-headedness and headaches. Psychiatric/Behavioral: Negative for agitation, behavioral problems, confusion, decreased concentration, dysphoric mood, hallucinations, self-injury, sleep disturbance and suicidal ideas. The patient is not nervous/anxious and is not hyperactive. Physical Exam:  /80 (Site: Left Upper Arm, Position: Sitting, Cuff Size: Medium Adult)   Pulse 81   Temp 98 °F (36.7 °C) (Oral)   Resp 16   Ht 5' 4\" (1.626 m)   Wt 168 lb 9.6 oz (76.5 kg)   SpO2 97%   BMI 28.94 kg/m²   BP Readings from Last 3 Encounters:   07/11/19 114/80   05/28/19 112/72   03/22/19 124/67     Body mass index is 28.94 kg/m². Physical Exam   Constitutional: She appears well-developed and well-nourished. She is cooperative. No distress. HENT:   Head: Normocephalic and atraumatic. Mouth/Throat: Oropharynx is clear and moist.   Eyes: Conjunctivae are normal. Right eye exhibits no discharge. Left eye exhibits no discharge. No scleral icterus. Neck: No thyromegaly present. Cardiovascular: Normal rate, regular rhythm and normal heart sounds. Pulmonary/Chest: Effort normal and breath sounds normal. No breast tenderness, discharge or bleeding. Abdominal: Soft. Bowel sounds are normal. She exhibits no distension and no mass. There is no tenderness. There is no rebound and no guarding. Genitourinary: Vagina normal and uterus normal. Rectal exam shows no external hemorrhoid and no mass. No breast tenderness, discharge or bleeding. There is no rash, tenderness, lesion or injury on the right labia. There is no rash, tenderness, lesion or injury on the left labia. Uterus is not deviated, not enlarged, not fixed and not tender. Cervix exhibits no motion tenderness, no discharge and no friability. Right adnexum displays no mass, no tenderness and no fullness. Left adnexum displays no mass, no tenderness and no fullness. No erythema or tenderness in the vagina.  No foreign body in the vagina. No signs of injury around the vagina. No vaginal discharge found. Genitourinary Comments: Mild erythema of skin - see diagram   Neurological: She is alert. Skin: Skin is warm. No rash noted. No erythema. No pallor. Psychiatric: She has a normal mood and affect. Her behavior is normal. Judgment and thought content normal.             Assessment/Plan:  1. Well woman exam with routine gynecological exam  - PAP SMEAR  Self breast exam discussed and encouraged  Diet and exercise discussed  Discussed daily multi-vitamin and adequate calcium and vitamin D in diet    2. Generalized abdominal pain  - US Pelvis Complete; Future  - US ABDOMEN COMPLETE; Future    3. Candidiasis of vulva and vagina  Hygiene discussed   - fluconazole (DIFLUCAN) 150 MG tablet; Take 1 tablet by mouth every 72 hours for 4 days  Dispense: 2 tablet; Refill: 0  - nystatin (MYCOSTATIN) 075086 UNIT/GM cream; Apply topically 2 times daily. Dispense: 30 g; Refill: 0      Return in about 2 weeks (around 7/25/2019).

## 2019-07-15 LAB
HPV COMMENT: NORMAL
HPV TYPE 16: NOT DETECTED
HPV TYPE 18: NOT DETECTED
HPVOH (OTHER TYPES): NOT DETECTED

## 2019-07-18 ENCOUNTER — HOSPITAL ENCOUNTER (OUTPATIENT)
Dept: ULTRASOUND IMAGING | Age: 59
Discharge: HOME | End: 2019-07-18
Payer: MEDICAID

## 2019-07-18 PROCEDURE — 76700 US EXAM ABDOM COMPLETE: CPT

## 2019-08-03 ENCOUNTER — HOSPITAL ENCOUNTER (OUTPATIENT)
Dept: ULTRASOUND IMAGING | Age: 59
Discharge: HOME | End: 2019-08-03
Payer: MEDICAID

## 2019-08-03 PROCEDURE — 76856 US EXAM PELVIC COMPLETE: CPT

## 2019-08-13 ENCOUNTER — HOSPITAL ENCOUNTER (EMERGENCY)
Age: 59
Discharge: HOME | End: 2019-08-13
Payer: MEDICAID

## 2019-08-13 VITALS
TEMPERATURE: 98 F | SYSTOLIC BLOOD PRESSURE: 102 MMHG | OXYGEN SATURATION: 96 % | WEIGHT: 165.5 LBS | DIASTOLIC BLOOD PRESSURE: 71 MMHG | HEIGHT: 64 IN | RESPIRATION RATE: 18 BRPM | HEART RATE: 74 BPM | BODY MASS INDEX: 28.25 KG/M2

## 2019-08-13 DIAGNOSIS — N89.8 VAGINAL IRRITATION: ICD-10-CM

## 2019-08-13 DIAGNOSIS — N89.8 VAGINA ITCHING: Primary | ICD-10-CM

## 2019-08-13 LAB
BACTERIA WET PREP: NORMAL
BILIRUBIN URINE: NEGATIVE
BLOOD, URINE: NEGATIVE
CLARITY: CLEAR
CLUE CELLS: NORMAL
COLOR: YELLOW
EPITHELIAL CELLS WET PREP: NORMAL
GLUCOSE URINE: NEGATIVE MG/DL
KETONES, URINE: NEGATIVE MG/DL
LEUKOCYTE ESTERASE, URINE: NEGATIVE
MICROSCOPIC EXAMINATION: NORMAL
NITRITE, URINE: NEGATIVE
PH UA: 7 (ref 5–8)
PROTEIN UA: NEGATIVE MG/DL
RBC WET PREP: NORMAL
SOURCE WET PREP: NORMAL
SPECIFIC GRAVITY UA: 1.02 (ref 1–1.03)
TRICHOMONAS PREP: NORMAL
URINE TYPE: NORMAL
UROBILINOGEN, URINE: 0.2 E.U./DL
WBC WET PREP: NORMAL
YEAST WET PREP: NORMAL

## 2019-08-13 PROCEDURE — 81003 URINALYSIS AUTO W/O SCOPE: CPT

## 2019-08-13 PROCEDURE — 87591 N.GONORRHOEAE DNA AMP PROB: CPT

## 2019-08-13 PROCEDURE — 99283 EMERGENCY DEPT VISIT LOW MDM: CPT

## 2019-08-13 PROCEDURE — 87210 SMEAR WET MOUNT SALINE/INK: CPT

## 2019-08-13 PROCEDURE — 87491 CHLMYD TRACH DNA AMP PROBE: CPT

## 2019-08-13 ASSESSMENT — ENCOUNTER SYMPTOMS
VOMITING: 0
SHORTNESS OF BREATH: 0
COUGH: 0
WHEEZING: 0
DIARRHEA: 0
NAUSEA: 0
RHINORRHEA: 0
ABDOMINAL PAIN: 0

## 2019-08-13 ASSESSMENT — PAIN DESCRIPTION - LOCATION: LOCATION: FLANK

## 2019-08-13 ASSESSMENT — PAIN SCALES - GENERAL: PAINLEVEL_OUTOF10: 8

## 2019-08-13 ASSESSMENT — PAIN DESCRIPTION - PAIN TYPE: TYPE: ACUTE PAIN

## 2019-08-13 NOTE — ED PROVIDER NOTES
cardiologist.  Please see their note for interpretation of EKG. RADIOLOGY:   Non-plain film images such as CT, Ultrasound and MRI are read by the radiologist. Plain radiographic images are visualized andpreliminarily interpreted by the  ED Provider with the below findings:        Interpretation perthe Radiologist below, if available at the time of this note:    No orders to display     No results found. PROCEDURES   Unless otherwise noted below, none     Procedures    CRITICAL CARE TIME   N/A    CONSULTS:  None      EMERGENCY DEPARTMENT COURSE and DIFFERENTIAL DIAGNOSIS/MDM:   Vitals:    Vitals:    08/13/19 1241 08/13/19 1305 08/13/19 1330 08/13/19 1400   BP: (!) 141/110 (!) 98/54 105/84 111/75   Pulse: 70 72  71   Resp: 18 16  16   Temp: 98 °F (36.7 °C)      TempSrc: Oral      SpO2: 98% 97%  96%   Weight: 165 lb 8 oz (75.1 kg)      Height: 5' 4\" (1.626 m)          Patient was given thefollowing medications:  Medications - No data to display    Patient presents for evaluation of vaginal itching/irritation. On exam, she is well-appearing and in no acute distress. Vitals are stable and she is afebrile. Lungs are clear to auscultation bilaterally. Abdomen is benign. Vaginal exam is unremarkable aside from mild excoriation and dryness of the skin of the upper vulva. No obvious discharge. No erythema satellite lesions, sores or other lesions. Urinalysis is negative. Wet prep is negative. Gonorrhea and Chlamydia cultures are pending. Patient was given refill of pramoxine and OB/GYN contact information for reevaluation more definitive treatment. Conditions for return to the ED were also discussed. She is agreeable to this plan is stable for discharge at this time. FINAL IMPRESSION      1. Vagina itching    2.  Vaginal irritation          DISPOSITION/PLAN   DISPOSITION Decision To Discharge 08/13/2019 02:18:52 PM      PATIENT REFERREDTO:  Marino Godoy DO  [address] [place] [id number], [address] [place] [address]

## 2019-08-14 LAB
C TRACH DNA GENITAL QL NAA+PROBE: NEGATIVE
N. GONORRHOEAE DNA: NEGATIVE

## 2019-08-20 ENCOUNTER — OFFICE VISIT (OUTPATIENT)
Dept: FAMILY MEDICINE CLINIC | Age: 59
End: 2019-08-20
Payer: MEDICAID

## 2019-08-20 VITALS
TEMPERATURE: 96.9 F | DIASTOLIC BLOOD PRESSURE: 68 MMHG | HEART RATE: 74 BPM | BODY MASS INDEX: 27.68 KG/M2 | HEIGHT: 64 IN | WEIGHT: 162.1 LBS | OXYGEN SATURATION: 94 % | SYSTOLIC BLOOD PRESSURE: 102 MMHG

## 2019-08-20 DIAGNOSIS — E03.9 HYPOTHYROIDISM, UNSPECIFIED TYPE: ICD-10-CM

## 2019-08-20 DIAGNOSIS — N90.89 VULVAR IRRITATION: Primary | ICD-10-CM

## 2019-08-20 DIAGNOSIS — R73.03 PRE-DIABETES: ICD-10-CM

## 2019-08-20 DIAGNOSIS — R30.0 DYSURIA: ICD-10-CM

## 2019-08-20 PROCEDURE — 99214 OFFICE O/P EST MOD 30 MIN: CPT | Performed by: FAMILY MEDICINE

## 2019-08-20 ASSESSMENT — ENCOUNTER SYMPTOMS
BACK PAIN: 0
VOMITING: 0
NAUSEA: 0
SORE THROAT: 0
SWOLLEN GLANDS: 0
ABDOMINAL PAIN: 0
CHANGE IN BOWEL HABIT: 0

## 2019-08-20 NOTE — PROGRESS NOTES
Subjective:      Patient ID: Carolin Pineda is a 63 y.o. female. Here with her son who assist as  (not available for this apt)    Here for follow up from ED visit  Reason for visit: vaginal itching   Diagnosis given: same  Treatment: vagisil  Work up: labs   Recommended follow up: 3 days   Now with following symptoms       Other   This is a chronic (pre dm) problem. The current episode started more than 1 year ago. The problem occurs constantly. Associated symptoms include urinary symptoms. Pertinent negatives include no abdominal pain, anorexia, change in bowel habit, chills, fatigue, headaches, nausea, rash, sore throat, swollen glands, vomiting or weakness. Nothing aggravates the symptoms. She has tried nothing for the symptoms. Vaginal Itching   The patient's primary symptoms include genital itching. The patient's pertinent negatives include no genital lesions, genital odor, genital rash, missed menses, pelvic pain, vaginal bleeding or vaginal discharge. This is a recurrent problem. The current episode started 1 to 4 weeks ago. The problem occurs intermittently. The problem has been unchanged. The pain is mild. The problem affects both sides. Associated symptoms include dysuria. Pertinent negatives include no abdominal pain, anorexia, back pain, chills, discolored urine, flank pain, frequency, headaches, nausea, rash, sore throat, urgency or vomiting. Nothing aggravates the symptoms. Treatments tried: vagisil. The treatment provided mild relief. She is not sexually active. Pre dm  denies  polyuria/polydipsia/polyphagia/paresthesias/wt gain or loss,       Review of Systems   Constitutional: Negative for chills and fatigue. HENT: Negative for sore throat. Gastrointestinal: Negative for abdominal pain, anorexia, change in bowel habit, nausea and vomiting. Genitourinary: Positive for dysuria. Negative for flank pain, frequency, missed menses, pelvic pain, urgency and vaginal discharge. STRENGTH) 500 MG tablet, Tab 2 once a day, Disp: 120 tablet, Rfl: 3    Elastic Bandages & Supports ([employer] LUMBAR/BACK SUPPORT BRACE) MISC, 1 applicator by Does not apply route daily, Disp: 1 each, Rfl: 0    tiZANidine (ZANAFLEX) 2 MG tablet, Take 1 tab po at night., Disp: 90 tablet, Rfl: 1    gabapentin (NEURONTIN) 100 MG capsule, Take 1 capsule by mouth 3 times daily for 90 days. , Disp: 90 capsule, Rfl: 3     has a past medical history of Arthritis, Asthma, Chronic bilateral thoracic back pain, GERD (gastroesophageal reflux disease), Intractable chronic migraine without aura and without status migrainosus, Primary osteoarthritis of both hands, and Thyroid disease. History reviewed. No pertinent surgical history. reports that she has never smoked. She has never used smokeless tobacco. She reports that she does not drink alcohol or use drugs. Family history is unknown by pt       Objective:  Blood pressure 102/68, pulse 74, temperature 96.9 °F (36.1 °C), temperature source Temporal, height 5' 4\" (1.626 m), weight 162 lb 1.6 oz (73.5 kg), SpO2 94 %, not currently breastfeeding. Physical Exam   Constitutional: She is oriented to person, place, and time. She appears well-developed and well-nourished. No distress. HENT:   Head: Normocephalic and atraumatic. Mouth/Throat: Oropharynx is clear and moist.   Eyes: Pupils are equal, round, and reactive to light. Conjunctivae and EOM are normal. No scleral icterus. Neck: Normal range of motion. Neck supple. No thyromegaly present. Cardiovascular: Normal rate, regular rhythm, normal heart sounds and intact distal pulses. Exam reveals no gallop and no friction rub. No murmur heard. Pulmonary/Chest: Effort normal and breath sounds normal. No respiratory distress. She has no wheezes. She has no rales. She exhibits no tenderness. Abdominal: Soft. Bowel sounds are normal. She exhibits no distension and no mass. There is tenderness (mild diffuse ttp).

## 2019-08-21 RX ORDER — LEVOTHYROXINE SODIUM 0.05 MG/1
50 TABLET ORAL DAILY
Qty: 90 TABLET | Refills: 0 | Status: SHIPPED | OUTPATIENT
Start: 2019-08-21 | End: 2019-09-10 | Stop reason: SDUPTHER

## 2019-08-31 ENCOUNTER — HOSPITAL ENCOUNTER (EMERGENCY)
Age: 59
Discharge: HOME | End: 2019-08-31
Payer: MEDICAID

## 2019-08-31 VITALS
TEMPERATURE: 97.5 F | BODY MASS INDEX: 28.25 KG/M2 | OXYGEN SATURATION: 99 % | HEART RATE: 70 BPM | WEIGHT: 164.56 LBS | SYSTOLIC BLOOD PRESSURE: 145 MMHG | DIASTOLIC BLOOD PRESSURE: 90 MMHG | RESPIRATION RATE: 14 BRPM

## 2019-08-31 DIAGNOSIS — R30.0 DYSURIA: Primary | ICD-10-CM

## 2019-08-31 LAB
A/G RATIO: 1.2 (ref 1.1–2.2)
ALBUMIN SERPL-MCNC: 4 G/DL (ref 3.4–5)
ALP BLD-CCNC: 59 U/L (ref 40–129)
ALT SERPL-CCNC: 26 U/L (ref 10–40)
ANION GAP SERPL CALCULATED.3IONS-SCNC: 10 MMOL/L (ref 3–16)
AST SERPL-CCNC: 29 U/L (ref 15–37)
BASOPHILS ABSOLUTE: 0.1 K/UL (ref 0–0.2)
BASOPHILS RELATIVE PERCENT: 0.8 %
BILIRUB SERPL-MCNC: <0.2 MG/DL (ref 0–1)
BILIRUBIN URINE: NEGATIVE
BLOOD, URINE: NEGATIVE
BUN BLDV-MCNC: 15 MG/DL (ref 7–20)
CALCIUM SERPL-MCNC: 9 MG/DL (ref 8.3–10.6)
CHLORIDE BLD-SCNC: 105 MMOL/L (ref 99–110)
CLARITY: CLEAR
CO2: 24 MMOL/L (ref 21–32)
COLOR: YELLOW
CREAT SERPL-MCNC: <0.5 MG/DL (ref 0.6–1.1)
EOSINOPHILS ABSOLUTE: 0.3 K/UL (ref 0–0.6)
EOSINOPHILS RELATIVE PERCENT: 4.3 %
GFR AFRICAN AMERICAN: >60
GFR NON-AFRICAN AMERICAN: >60
GLOBULIN: 3.4 G/DL
GLUCOSE BLD-MCNC: 94 MG/DL (ref 70–99)
GLUCOSE URINE: NEGATIVE MG/DL
HCT VFR BLD CALC: 34.5 % (ref 36–48)
HEMOGLOBIN: 11.5 G/DL (ref 12–16)
KETONES, URINE: NEGATIVE MG/DL
LEUKOCYTE ESTERASE, URINE: NEGATIVE
LIPASE: 46 U/L (ref 13–60)
LYMPHOCYTES ABSOLUTE: 2.3 K/UL (ref 1–5.1)
LYMPHOCYTES RELATIVE PERCENT: 36.6 %
MCH RBC QN AUTO: 30.6 PG (ref 26–34)
MCHC RBC AUTO-ENTMCNC: 33.2 G/DL (ref 31–36)
MCV RBC AUTO: 92.1 FL (ref 80–100)
MICROSCOPIC EXAMINATION: NORMAL
MONOCYTES ABSOLUTE: 0.6 K/UL (ref 0–1.3)
MONOCYTES RELATIVE PERCENT: 10 %
NEUTROPHILS ABSOLUTE: 3.1 K/UL (ref 1.7–7.7)
NEUTROPHILS RELATIVE PERCENT: 48.3 %
NITRITE, URINE: NEGATIVE
PDW BLD-RTO: 13.5 % (ref 12.4–15.4)
PH UA: 5 (ref 5–8)
PLATELET # BLD: 115 K/UL (ref 135–450)
PMV BLD AUTO: 11.5 FL (ref 5–10.5)
POTASSIUM REFLEX MAGNESIUM: 4.1 MMOL/L (ref 3.5–5.1)
PROTEIN UA: NEGATIVE MG/DL
RBC # BLD: 3.74 M/UL (ref 4–5.2)
SODIUM BLD-SCNC: 139 MMOL/L (ref 136–145)
SPECIFIC GRAVITY UA: 1.02 (ref 1–1.03)
TOTAL PROTEIN: 7.4 G/DL (ref 6.4–8.2)
URINE REFLEX TO CULTURE: NORMAL
URINE TYPE: NORMAL
UROBILINOGEN, URINE: 0.2 E.U./DL
WBC # BLD: 6.4 K/UL (ref 4–11)

## 2019-08-31 PROCEDURE — 99283 EMERGENCY DEPT VISIT LOW MDM: CPT

## 2019-08-31 PROCEDURE — 81003 URINALYSIS AUTO W/O SCOPE: CPT

## 2019-08-31 PROCEDURE — 2580000003 HC RX 258: Performed by: NURSE PRACTITIONER

## 2019-08-31 PROCEDURE — 85025 COMPLETE CBC W/AUTO DIFF WBC: CPT

## 2019-08-31 PROCEDURE — 83690 ASSAY OF LIPASE: CPT

## 2019-08-31 PROCEDURE — 96360 HYDRATION IV INFUSION INIT: CPT

## 2019-08-31 PROCEDURE — 80053 COMPREHEN METABOLIC PANEL: CPT

## 2019-08-31 RX ORDER — NITROFURANTOIN 25; 75 MG/1; MG/1
100 CAPSULE ORAL 2 TIMES DAILY
Qty: 10 CAPSULE | Refills: 0 | Status: SHIPPED | OUTPATIENT
Start: 2019-08-31 | End: 2019-09-05

## 2019-08-31 RX ORDER — PHENAZOPYRIDINE HYDROCHLORIDE 200 MG/1
200 TABLET, FILM COATED ORAL 3 TIMES DAILY PRN
Qty: 6 TABLET | Refills: 0 | Status: SHIPPED | OUTPATIENT
Start: 2019-08-31 | End: 2019-09-03

## 2019-08-31 RX ORDER — 0.9 % SODIUM CHLORIDE 0.9 %
1000 INTRAVENOUS SOLUTION INTRAVENOUS ONCE
Status: COMPLETED | OUTPATIENT
Start: 2019-08-31 | End: 2019-08-31

## 2019-08-31 RX ADMIN — SODIUM CHLORIDE 1000 ML: 9 INJECTION, SOLUTION INTRAVENOUS at 17:07

## 2019-08-31 ASSESSMENT — ENCOUNTER SYMPTOMS
NAUSEA: 0
ABDOMINAL PAIN: 0
SHORTNESS OF BREATH: 0
DIARRHEA: 0
CONSTIPATION: 0
BLOOD IN STOOL: 0
RHINORRHEA: 0
VOMITING: 0
SORE THROAT: 0

## 2019-08-31 ASSESSMENT — PAIN DESCRIPTION - LOCATION: LOCATION: VAGINA

## 2019-08-31 ASSESSMENT — PAIN SCALES - GENERAL: PAINLEVEL_OUTOF10: 8

## 2019-08-31 ASSESSMENT — PAIN DESCRIPTION - PAIN TYPE: TYPE: ACUTE PAIN

## 2019-08-31 NOTE — ED PROVIDER NOTES
[facility]        Pt Name: Jaren Mulligan  MRN: [id number]  [place] 1960  Date of evaluation: 8/31/2019  Provider: JANIE Hobbs - CNP  PCP: Geo Bhandari MD    This patient was not seen and evaluated by the attending physician No att. providers found. CHIEF COMPLAINT       Chief Complaint   Patient presents with    Abscess     napali  [id number] on vaginal wall for a couple days now. draining from blister. more pain and pressure. had itching rash 3 or 4 months and took some medication and improved but over the last few days started with the painful blister. HISTORY OF PRESENT ILLNESS   (Location/Symptom, Timing/Onset,Context/Setting, Quality, Duration, Modifying Factors, Severity)  Note limiting factors. Jaren Mulligan is a 63 y.o. female who presents emergency department with concern for burning with urination. Symptoms present for about 3 days. She also reports bilateral flank pain. She is tried no over-the-counter prescribed remedies for symptoms. She denies fever, rash, headaches, dizziness, chest pain, shortness of breath, cough, congestion, abdominal pain, nausea, vomiting, diarrhea, constipation, or blood in the stool. No family at bedside. Nursing Notes triage note reviewed. I believe the discrepancy between the nursing note and my note is secondary to the phone . REVIEW OF SYSTEMS    (2-9 systems for level 4, 10 or more for level 5)     Review of Systems   Constitutional: Negative for chills and fever. HENT: Negative for postnasal drip, rhinorrhea and sore throat. Eyes: Negative for visual disturbance. Respiratory: Negative for shortness of breath. Cardiovascular: Negative for chest pain. Gastrointestinal: Negative for abdominal pain, blood in stool, constipation, diarrhea, nausea and vomiting. Genitourinary: Positive for dysuria and flank pain. Negative for hematuria. Skin: Negative for rash. Neurological: Negative for weakness and headaches. All other systems reviewed and are negative. Positives and Pertinent negatives as per HPI. Except as noted above in the ROS, all other systems were reviewed and negative. PAST MEDICAL HISTORY     Past Medical History:   Diagnosis Date    Arthritis     Asthma     Chronic bilateral thoracic back pain 6/13/2018    GERD (gastroesophageal reflux disease)     Intractable chronic migraine without aura and without status migrainosus 7/29/2016    Primary osteoarthritis of both hands 10/20/2015    Thyroid disease          SURGICAL HISTORY     History reviewed. No pertinent surgical history. CURRENT MEDICATIONS       Previous Medications    ACETAMINOPHEN (APAP EXTRA STRENGTH) 500 MG TABLET    Tab 2 once a day    ACETAMINOPHEN (APAP EXTRA STRENGTH) 500 MG TABLET    Take 1 tablet by mouth every 6 hours as needed for Pain    ALBUTEROL SULFATE HFA (PROVENTIL HFA) 108 (90 BASE) MCG/ACT INHALER    Inhale 2 puffs into the lungs every 6 hours as needed for Wheezing or Shortness of Breath (and/or persistent cough)    CHOLECALCIFEROL (VITAMIN D) 2000 UNITS TABS TABLET    Take 1 tablet by mouth daily After finishing 12 week couse    CLOTRIMAZOLE-BETAMETHASONE (LOTRISONE) 1-0.05 % CREAM    Apply topically 2 times daily. DIAPERS & SUPPLIES MISC    1 each by Does not apply route daily    DICLOFENAC SODIUM (VOLTAREN) 1 % GEL    Apply 2 grams in affected joints 2-3 times a day. ELASTIC BANDAGES & SUPPORTS ([employer] LUMBAR/BACK SUPPORT BRACE) MISC    1 applicator by Does not apply route daily    GABAPENTIN (NEURONTIN) 100 MG CAPSULE    Take 1 capsule by mouth 3 times daily for 90 days.     IBUPROFEN (IBU) 800 MG TABLET    Take 1 tablet by mouth every 6 hours as needed for Pain    LEVOTHYROXINE (SYNTHROID) 50 MCG TABLET    Take 1 tablet by mouth daily    MINERAL OIL-HYDROPHILIC PETROLATUM (AQUAPHOR) OINTMENT    Apply

## 2019-09-10 ENCOUNTER — OFFICE VISIT (OUTPATIENT)
Dept: FAMILY MEDICINE CLINIC | Age: 59
End: 2019-09-10
Payer: MEDICAID

## 2019-09-10 VITALS
BODY MASS INDEX: 28.34 KG/M2 | OXYGEN SATURATION: 96 % | SYSTOLIC BLOOD PRESSURE: 120 MMHG | WEIGHT: 166 LBS | DIASTOLIC BLOOD PRESSURE: 82 MMHG | HEIGHT: 64 IN | HEART RATE: 65 BPM | TEMPERATURE: 97.5 F

## 2019-09-10 DIAGNOSIS — R55 PRE-SYNCOPE: ICD-10-CM

## 2019-09-10 DIAGNOSIS — E03.9 HYPOTHYROIDISM, UNSPECIFIED TYPE: ICD-10-CM

## 2019-09-10 DIAGNOSIS — R06.2 WHEEZING: ICD-10-CM

## 2019-09-10 DIAGNOSIS — E53.9 BURNING FEET SYNDROME: ICD-10-CM

## 2019-09-10 DIAGNOSIS — K21.9 GASTROESOPHAGEAL REFLUX DISEASE WITHOUT ESOPHAGITIS: ICD-10-CM

## 2019-09-10 DIAGNOSIS — F51.04 CHRONIC INSOMNIA: ICD-10-CM

## 2019-09-10 DIAGNOSIS — M19.90 OSTEOARTHRITIS, UNSPECIFIED OSTEOARTHRITIS TYPE, UNSPECIFIED SITE: ICD-10-CM

## 2019-09-10 DIAGNOSIS — K62.89 RECTAL PAIN: Primary | ICD-10-CM

## 2019-09-10 DIAGNOSIS — K64.5 HEMORRHOID THROMBOSIS: ICD-10-CM

## 2019-09-10 PROCEDURE — 99215 OFFICE O/P EST HI 40 MIN: CPT | Performed by: FAMILY MEDICINE

## 2019-09-10 RX ORDER — TRAZODONE HYDROCHLORIDE 50 MG/1
50 TABLET ORAL NIGHTLY PRN
Qty: 30 TABLET | Refills: 5 | Status: SHIPPED | OUTPATIENT
Start: 2019-09-10 | End: 2019-10-11 | Stop reason: SDUPTHER

## 2019-09-10 RX ORDER — ERGOCALCIFEROL 1.25 MG/1
50000 CAPSULE ORAL WEEKLY
Qty: 4 CAPSULE | Refills: 2 | Status: CANCELLED | OUTPATIENT
Start: 2019-09-10

## 2019-09-10 RX ORDER — IBUPROFEN 800 MG/1
800 TABLET ORAL EVERY 8 HOURS PRN
Qty: 90 TABLET | Refills: 0 | Status: SHIPPED | OUTPATIENT
Start: 2019-09-10 | End: 2019-10-11 | Stop reason: SDUPTHER

## 2019-09-10 RX ORDER — OMEPRAZOLE 20 MG/1
20 CAPSULE, DELAYED RELEASE ORAL DAILY
Qty: 90 CAPSULE | Refills: 1 | Status: SHIPPED | OUTPATIENT
Start: 2019-09-10 | End: 2019-10-11 | Stop reason: SDUPTHER

## 2019-09-10 RX ORDER — LEVOTHYROXINE SODIUM 0.05 MG/1
50 TABLET ORAL DAILY
Qty: 90 TABLET | Refills: 0 | Status: SHIPPED | OUTPATIENT
Start: 2019-09-10 | End: 2019-10-11 | Stop reason: SDUPTHER

## 2019-09-10 RX ORDER — GABAPENTIN 100 MG/1
100 CAPSULE ORAL 3 TIMES DAILY
Qty: 90 CAPSULE | Refills: 1 | Status: SHIPPED | OUTPATIENT
Start: 2019-09-10 | End: 2019-10-11 | Stop reason: SDUPTHER

## 2019-09-10 RX ORDER — ALBUTEROL SULFATE 90 UG/1
2 AEROSOL, METERED RESPIRATORY (INHALATION) EVERY 6 HOURS PRN
Qty: 1 INHALER | Refills: 5 | Status: SHIPPED | OUTPATIENT
Start: 2019-09-10 | End: 2019-10-11 | Stop reason: SDUPTHER

## 2019-09-10 ASSESSMENT — ENCOUNTER SYMPTOMS
VOMITING: 0
VISUAL CHANGE: 0
SHORTNESS OF BREATH: 0
NAUSEA: 0
WHEEZING: 1
CHEST TIGHTNESS: 0
CHANGE IN BOWEL HABIT: 0
ABDOMINAL PAIN: 1

## 2019-09-10 NOTE — PROGRESS NOTES
Subjective:      Patient ID: Kadeem Lui is a 63 y.o. female. Here with multiple c/o, assisted with . Other   This is a new (rectal pain, I feel a mass in my rectum\") problem. The current episode started in the past 7 days. The problem occurs constantly. The problem has been unchanged. Associated symptoms include abdominal pain (chronic), fatigue (chronic), headaches (chronic), myalgias (chronic), vertigo (chronic) and weakness (chronic ). Pertinent negatives include no anorexia, arthralgias, change in bowel habit (denies constipation , bleeding or diarrhea), chest pain, chills, diaphoresis, fever, nausea, rash, urinary symptoms, visual change or vomiting. Nothing aggravates the symptoms. She has tried nothing for the symptoms. Dizziness   This is a recurrent (lightheaded) problem. Episode onset: 2 times. The problem occurs intermittently. The problem has been resolved. Associated symptoms include abdominal pain (chronic), fatigue (chronic), headaches (chronic), myalgias (chronic), vertigo (chronic) and weakness (chronic ). Pertinent negatives include no anorexia, arthralgias, change in bowel habit (denies constipation , bleeding or diarrhea), chest pain, chills, diaphoresis, fever, nausea, rash, urinary symptoms, visual change or vomiting. Nothing aggravates the symptoms. She has tried nothing for the symptoms. Burning feet   Chronic, her sx are stable with current tx (gabapentin )  No sec effects  Needs refills. Chronic insomnia  Her sx are improved with trazodone  + hx of fatigue but this is unchanged with med    Hypothyroidism:  Now on replacement therapy, denies any fatigue, slow thought process, tremor, hair loss, diaphoresis, heat/cold intolerance, wt gain/loss, diarrhea/constipation.      Joint pains  Unchanged, takes ibuprofen and tylenol prn     GERD  Her sx are well controlled with prilosec  Denies/n/v/hematemesis or melena    Wheezing  Rare sx  Needs rx             Review

## 2019-09-17 ENCOUNTER — OFFICE VISIT (OUTPATIENT)
Dept: SURGERY | Age: 59
End: 2019-09-17
Payer: MEDICAID

## 2019-09-17 VITALS — WEIGHT: 167 LBS | DIASTOLIC BLOOD PRESSURE: 72 MMHG | SYSTOLIC BLOOD PRESSURE: 119 MMHG | BODY MASS INDEX: 28.67 KG/M2

## 2019-09-17 DIAGNOSIS — K64.5 INTERNAL AND EXTERNAL THROMBOSED HEMORRHOIDS: ICD-10-CM

## 2019-09-17 DIAGNOSIS — K64.2 GRADE III HEMORRHOIDS: Primary | ICD-10-CM

## 2019-09-17 DIAGNOSIS — K64.8 INTERNAL AND EXTERNAL THROMBOSED HEMORRHOIDS: ICD-10-CM

## 2019-09-17 PROCEDURE — 99243 OFF/OP CNSLTJ NEW/EST LOW 30: CPT | Performed by: SURGERY

## 2019-09-17 ASSESSMENT — ENCOUNTER SYMPTOMS
EYES NEGATIVE: 1
ABDOMINAL DISTENTION: 1
ALLERGIC/IMMUNOLOGIC NEGATIVE: 1
VOMITING: 1
ABDOMINAL PAIN: 1
RESPIRATORY NEGATIVE: 1
RECTAL PAIN: 1

## 2019-09-17 NOTE — PROGRESS NOTES
[facility] GENERAL AND LAPAROSCOPIC SURGERY                       PATIENT NAME: Rocio Colbert        TODAY'S DATE: 9/17/2019    Reason for Consult:  Rectal Pain    Requesting Physician:  TRAN Sun MD    HISTORY OF PRESENT ILLNESS:              The patient is a 63 y.o. female who presents with anorectal pain. Pt had tenderness and pressure, felt lump in the area. No gross bleeding. No prior surgery in the area. Past Medical History:        Diagnosis Date    Arthritis     Asthma     Chronic bilateral thoracic back pain 6/13/2018    GERD (gastroesophageal reflux disease)     Intractable chronic migraine without aura and without status migrainosus 7/29/2016    Primary osteoarthritis of both hands 10/20/2015    Thyroid disease        Past Surgical History:    No past surgical history on file. Current Medications:   No current facility-administered medications for this visit. Prior to Admission medications    Medication Sig Start Date End Date Taking? Authorizing Provider   hydrocortisone (ANUSOL-HC) 2.5 % rectal cream Place rectally 2 times daily. 9/10/19  Yes Anay Estrada MD   gabapentin (NEURONTIN) 100 MG capsule Take 1 capsule by mouth 3 times daily for 90 days.  9/10/19 12/9/19 Yes Anay Estrada MD   levothyroxine (SYNTHROID) 50 MCG tablet Take 1 tablet by mouth daily 9/10/19  Yes Anay Estrada MD   ibuprofen (IBU) 800 MG tablet Take 1 tablet by mouth every 8 hours as needed for Pain 9/10/19  Yes Anay Estrada MD   omeprazole (PRILOSEC) 20 MG delayed release capsule Take 1 capsule by mouth daily 9/10/19 9/9/20 Yes Anay Estrada MD   traZODone (DESYREL) 50 MG tablet Take 1 tablet by mouth nightly as needed for Sleep 9/10/19  Yes Anay Estrada MD   albuterol sulfate HFA (PROVENTIL HFA) 108 (90 Base) MCG/ACT inhaler Inhale 2 puffs into the lungs every 6 hours as needed for Wheezing or Shortness of Breath (and/or persistent cough) 9/10/19  Yes Anay Estrada MD   Pramoxine HCl

## 2019-09-23 ENCOUNTER — ANESTHESIA (OUTPATIENT)
Dept: OPERATING ROOM | Age: 59
End: 2019-09-23
Payer: MEDICAID

## 2019-09-23 ENCOUNTER — ANESTHESIA EVENT (OUTPATIENT)
Dept: OPERATING ROOM | Age: 59
End: 2019-09-23
Payer: MEDICAID

## 2019-09-23 ENCOUNTER — HOSPITAL ENCOUNTER (OUTPATIENT)
Age: 59
Setting detail: OUTPATIENT SURGERY
Discharge: HOME | End: 2019-09-23
Attending: SURGERY | Admitting: SURGERY
Payer: MEDICAID

## 2019-09-23 VITALS
SYSTOLIC BLOOD PRESSURE: 99 MMHG | OXYGEN SATURATION: 99 % | RESPIRATION RATE: 15 BRPM | DIASTOLIC BLOOD PRESSURE: 59 MMHG

## 2019-09-23 VITALS
OXYGEN SATURATION: 98 % | SYSTOLIC BLOOD PRESSURE: 127 MMHG | BODY MASS INDEX: 28.51 KG/M2 | TEMPERATURE: 97.4 F | RESPIRATION RATE: 14 BRPM | HEART RATE: 78 BPM | WEIGHT: 167 LBS | HEIGHT: 64 IN | DIASTOLIC BLOOD PRESSURE: 84 MMHG

## 2019-09-23 DIAGNOSIS — K64.2 GRADE III HEMORRHOIDS: Primary | ICD-10-CM

## 2019-09-23 LAB
EKG ATRIAL RATE: 97 BPM
EKG DIAGNOSIS: NORMAL
EKG P AXIS: 47 DEGREES
EKG P-R INTERVAL: 168 MS
EKG Q-T INTERVAL: 362 MS
EKG QRS DURATION: 74 MS
EKG QTC CALCULATION (BAZETT): 459 MS
EKG R AXIS: 14 DEGREES
EKG T AXIS: 17 DEGREES
EKG VENTRICULAR RATE: 97 BPM
GLUCOSE BLD-MCNC: 123 MG/DL (ref 70–99)
PERFORMED ON: ABNORMAL

## 2019-09-23 PROCEDURE — 2580000003 HC RX 258: Performed by: SURGERY

## 2019-09-23 PROCEDURE — 88304 TISSUE EXAM BY PATHOLOGIST: CPT

## 2019-09-23 PROCEDURE — 2500000003 HC RX 250 WO HCPCS: Performed by: SURGERY

## 2019-09-23 PROCEDURE — 6360000002 HC RX W HCPCS: Performed by: SURGERY

## 2019-09-23 PROCEDURE — 93010 ELECTROCARDIOGRAM REPORT: CPT | Performed by: INTERNAL MEDICINE

## 2019-09-23 PROCEDURE — 6360000002 HC RX W HCPCS: Performed by: ANESTHESIOLOGY

## 2019-09-23 PROCEDURE — 7100000011 HC PHASE II RECOVERY - ADDTL 15 MIN: Performed by: SURGERY

## 2019-09-23 PROCEDURE — 7100000001 HC PACU RECOVERY - ADDTL 15 MIN: Performed by: SURGERY

## 2019-09-23 PROCEDURE — 2709999900 HC NON-CHARGEABLE SUPPLY: Performed by: SURGERY

## 2019-09-23 PROCEDURE — 7100000000 HC PACU RECOVERY - FIRST 15 MIN: Performed by: SURGERY

## 2019-09-23 PROCEDURE — 2500000003 HC RX 250 WO HCPCS: Performed by: ANESTHESIOLOGY

## 2019-09-23 PROCEDURE — 6370000000 HC RX 637 (ALT 250 FOR IP): Performed by: SURGERY

## 2019-09-23 PROCEDURE — 3600000013 HC SURGERY LEVEL 3 ADDTL 15MIN: Performed by: SURGERY

## 2019-09-23 PROCEDURE — 3600000003 HC SURGERY LEVEL 3 BASE: Performed by: SURGERY

## 2019-09-23 PROCEDURE — 3700000000 HC ANESTHESIA ATTENDED CARE: Performed by: SURGERY

## 2019-09-23 PROCEDURE — 46260 REMOVE IN/EX HEM GROUPS 2+: CPT | Performed by: SURGERY

## 2019-09-23 PROCEDURE — 93005 ELECTROCARDIOGRAM TRACING: CPT | Performed by: SURGERY

## 2019-09-23 PROCEDURE — 7100000010 HC PHASE II RECOVERY - FIRST 15 MIN: Performed by: SURGERY

## 2019-09-23 PROCEDURE — 3700000001 HC ADD 15 MINUTES (ANESTHESIA): Performed by: SURGERY

## 2019-09-23 RX ORDER — ONDANSETRON 2 MG/ML
4 INJECTION INTRAMUSCULAR; INTRAVENOUS
Status: DISCONTINUED | OUTPATIENT
Start: 2019-09-23 | End: 2019-09-23 | Stop reason: HOSPADM

## 2019-09-23 RX ORDER — SODIUM CHLORIDE 0.9 % (FLUSH) 0.9 %
10 SYRINGE (ML) INJECTION PRN
Status: DISCONTINUED | OUTPATIENT
Start: 2019-09-23 | End: 2019-09-23 | Stop reason: HOSPADM

## 2019-09-23 RX ORDER — LABETALOL HYDROCHLORIDE 5 MG/ML
5 INJECTION, SOLUTION INTRAVENOUS EVERY 10 MIN PRN
Status: DISCONTINUED | OUTPATIENT
Start: 2019-09-23 | End: 2019-09-23 | Stop reason: HOSPADM

## 2019-09-23 RX ORDER — HYDROMORPHONE HCL 110MG/55ML
0.25 PATIENT CONTROLLED ANALGESIA SYRINGE INTRAVENOUS EVERY 5 MIN PRN
Status: DISCONTINUED | OUTPATIENT
Start: 2019-09-23 | End: 2019-09-23 | Stop reason: HOSPADM

## 2019-09-23 RX ORDER — PROPOFOL 10 MG/ML
INJECTION, EMULSION INTRAVENOUS PRN
Status: DISCONTINUED | OUTPATIENT
Start: 2019-09-23 | End: 2019-09-23 | Stop reason: SDUPTHER

## 2019-09-23 RX ORDER — LIDOCAINE HYDROCHLORIDE 20 MG/ML
INJECTION, SOLUTION EPIDURAL; INFILTRATION; INTRACAUDAL; PERINEURAL PRN
Status: DISCONTINUED | OUTPATIENT
Start: 2019-09-23 | End: 2019-09-23 | Stop reason: SDUPTHER

## 2019-09-23 RX ORDER — BUPIVACAINE HYDROCHLORIDE AND EPINEPHRINE 5; 5 MG/ML; UG/ML
INJECTION, SOLUTION EPIDURAL; INTRACAUDAL; PERINEURAL
Status: COMPLETED | OUTPATIENT
Start: 2019-09-23 | End: 2019-09-23

## 2019-09-23 RX ORDER — OXYCODONE HYDROCHLORIDE AND ACETAMINOPHEN 5; 325 MG/1; MG/1
1 TABLET ORAL
Status: DISCONTINUED | OUTPATIENT
Start: 2019-09-23 | End: 2019-09-23 | Stop reason: HOSPADM

## 2019-09-23 RX ORDER — FENTANYL CITRATE 50 UG/ML
INJECTION, SOLUTION INTRAMUSCULAR; INTRAVENOUS PRN
Status: DISCONTINUED | OUTPATIENT
Start: 2019-09-23 | End: 2019-09-23 | Stop reason: SDUPTHER

## 2019-09-23 RX ORDER — SUCCINYLCHOLINE CHLORIDE 20 MG/ML
INJECTION INTRAMUSCULAR; INTRAVENOUS PRN
Status: DISCONTINUED | OUTPATIENT
Start: 2019-09-23 | End: 2019-09-23 | Stop reason: SDUPTHER

## 2019-09-23 RX ORDER — HYDROCODONE BITARTRATE AND ACETAMINOPHEN 5; 325 MG/1; MG/1
1 TABLET ORAL EVERY 4 HOURS PRN
Qty: 25 TABLET | Refills: 0 | Status: SHIPPED | OUTPATIENT
Start: 2019-09-23 | End: 2019-09-30

## 2019-09-23 RX ORDER — LIDOCAINE HYDROCHLORIDE 10 MG/ML
1 INJECTION, SOLUTION EPIDURAL; INFILTRATION; INTRACAUDAL; PERINEURAL
Status: DISCONTINUED | OUTPATIENT
Start: 2019-09-23 | End: 2019-09-23 | Stop reason: HOSPADM

## 2019-09-23 RX ORDER — METOCLOPRAMIDE HYDROCHLORIDE 5 MG/ML
INJECTION INTRAMUSCULAR; INTRAVENOUS PRN
Status: DISCONTINUED | OUTPATIENT
Start: 2019-09-23 | End: 2019-09-23 | Stop reason: SDUPTHER

## 2019-09-23 RX ORDER — KETOROLAC TROMETHAMINE 30 MG/ML
INJECTION, SOLUTION INTRAMUSCULAR; INTRAVENOUS PRN
Status: DISCONTINUED | OUTPATIENT
Start: 2019-09-23 | End: 2019-09-23 | Stop reason: SDUPTHER

## 2019-09-23 RX ORDER — MEPERIDINE HYDROCHLORIDE 25 MG/ML
12.5 INJECTION INTRAMUSCULAR; INTRAVENOUS; SUBCUTANEOUS EVERY 5 MIN PRN
Status: DISCONTINUED | OUTPATIENT
Start: 2019-09-23 | End: 2019-09-23 | Stop reason: HOSPADM

## 2019-09-23 RX ORDER — SODIUM CHLORIDE 0.9 % (FLUSH) 0.9 %
10 SYRINGE (ML) INJECTION EVERY 12 HOURS SCHEDULED
Status: DISCONTINUED | OUTPATIENT
Start: 2019-09-23 | End: 2019-09-23 | Stop reason: HOSPADM

## 2019-09-23 RX ORDER — SODIUM CHLORIDE 9 MG/ML
INJECTION, SOLUTION INTRAVENOUS CONTINUOUS
Status: DISCONTINUED | OUTPATIENT
Start: 2019-09-23 | End: 2019-09-23 | Stop reason: HOSPADM

## 2019-09-23 RX ORDER — DIPHENHYDRAMINE HYDROCHLORIDE 50 MG/ML
12.5 INJECTION INTRAMUSCULAR; INTRAVENOUS ONCE
Status: COMPLETED | OUTPATIENT
Start: 2019-09-23 | End: 2019-09-23

## 2019-09-23 RX ORDER — SODIUM CHLORIDE, SODIUM LACTATE, POTASSIUM CHLORIDE, CALCIUM CHLORIDE 600; 310; 30; 20 MG/100ML; MG/100ML; MG/100ML; MG/100ML
INJECTION, SOLUTION INTRAVENOUS CONTINUOUS
Status: DISCONTINUED | OUTPATIENT
Start: 2019-09-23 | End: 2019-09-23 | Stop reason: HOSPADM

## 2019-09-23 RX ORDER — MIDAZOLAM HYDROCHLORIDE 1 MG/ML
0.5 INJECTION INTRAMUSCULAR; INTRAVENOUS ONCE
Status: COMPLETED | OUTPATIENT
Start: 2019-09-23 | End: 2019-09-23

## 2019-09-23 RX ORDER — HYDRALAZINE HYDROCHLORIDE 20 MG/ML
5 INJECTION INTRAMUSCULAR; INTRAVENOUS EVERY 10 MIN PRN
Status: DISCONTINUED | OUTPATIENT
Start: 2019-09-23 | End: 2019-09-23 | Stop reason: HOSPADM

## 2019-09-23 RX ORDER — ONDANSETRON 2 MG/ML
INJECTION INTRAMUSCULAR; INTRAVENOUS PRN
Status: DISCONTINUED | OUTPATIENT
Start: 2019-09-23 | End: 2019-09-23 | Stop reason: SDUPTHER

## 2019-09-23 RX ORDER — CEFAZOLIN SODIUM 2 G/100ML
2 INJECTION, SOLUTION INTRAVENOUS
Status: COMPLETED | OUTPATIENT
Start: 2019-09-23 | End: 2019-09-23

## 2019-09-23 RX ORDER — GLYCOPYRROLATE 0.2 MG/ML
INJECTION INTRAMUSCULAR; INTRAVENOUS PRN
Status: DISCONTINUED | OUTPATIENT
Start: 2019-09-23 | End: 2019-09-23 | Stop reason: SDUPTHER

## 2019-09-23 RX ORDER — DEXAMETHASONE SODIUM PHOSPHATE 4 MG/ML
INJECTION, SOLUTION INTRA-ARTICULAR; INTRALESIONAL; INTRAMUSCULAR; INTRAVENOUS; SOFT TISSUE PRN
Status: DISCONTINUED | OUTPATIENT
Start: 2019-09-23 | End: 2019-09-23 | Stop reason: SDUPTHER

## 2019-09-23 RX ORDER — CEFAZOLIN SODIUM 1 G/3ML
INJECTION, POWDER, FOR SOLUTION INTRAMUSCULAR; INTRAVENOUS PRN
Status: DISCONTINUED | OUTPATIENT
Start: 2019-09-23 | End: 2019-09-23 | Stop reason: SDUPTHER

## 2019-09-23 RX ADMIN — ONDANSETRON 4 MG: 2 INJECTION INTRAMUSCULAR; INTRAVENOUS at 10:14

## 2019-09-23 RX ADMIN — GLYCOPYRROLATE 0.2 MG: 0.2 INJECTION INTRAMUSCULAR; INTRAVENOUS at 10:50

## 2019-09-23 RX ADMIN — METOCLOPRAMIDE 5 MG: 5 INJECTION, SOLUTION INTRAMUSCULAR; INTRAVENOUS at 10:14

## 2019-09-23 RX ADMIN — SODIUM CHLORIDE: 9 INJECTION, SOLUTION INTRAVENOUS at 09:53

## 2019-09-23 RX ADMIN — PROPOFOL 150 MG: 10 INJECTION, EMULSION INTRAVENOUS at 10:14

## 2019-09-23 RX ADMIN — MIDAZOLAM 0.5 MG: 1 INJECTION INTRAMUSCULAR; INTRAVENOUS at 11:41

## 2019-09-23 RX ADMIN — CEFAZOLIN SODIUM 2 G: 2 INJECTION, SOLUTION INTRAVENOUS at 10:07

## 2019-09-23 RX ADMIN — CEFAZOLIN 2000 MG: 1 INJECTION, POWDER, FOR SOLUTION INTRAVENOUS at 10:09

## 2019-09-23 RX ADMIN — DEXAMETHASONE SODIUM PHOSPHATE 4 MG: 4 INJECTION, SOLUTION INTRAMUSCULAR; INTRAVENOUS at 10:14

## 2019-09-23 RX ADMIN — KETOROLAC TROMETHAMINE 15 MG: 30 INJECTION, SOLUTION INTRAMUSCULAR; INTRAVENOUS at 10:53

## 2019-09-23 RX ADMIN — LIDOCAINE HYDROCHLORIDE 100 MG: 20 INJECTION, SOLUTION EPIDURAL; INFILTRATION; INTRACAUDAL; PERINEURAL at 10:14

## 2019-09-23 RX ADMIN — SUCCINYLCHOLINE CHLORIDE 120 MG: 20 INJECTION, SOLUTION INTRAMUSCULAR; INTRAVENOUS at 10:14

## 2019-09-23 RX ADMIN — FENTANYL CITRATE 100 MCG: 50 INJECTION, SOLUTION INTRAMUSCULAR; INTRAVENOUS at 10:14

## 2019-09-23 RX ADMIN — SODIUM CHLORIDE: 9 INJECTION, SOLUTION INTRAVENOUS at 09:11

## 2019-09-23 RX ADMIN — METRONIDAZOLE 500 MG: 500 INJECTION, SOLUTION INTRAVENOUS at 09:26

## 2019-09-23 RX ADMIN — METRONIDAZOLE 500 MG: 500 INJECTION, SOLUTION INTRAVENOUS at 10:09

## 2019-09-23 RX ADMIN — DIPHENHYDRAMINE HYDROCHLORIDE 12.5 MG: 50 INJECTION, SOLUTION INTRAMUSCULAR; INTRAVENOUS at 11:46

## 2019-09-23 ASSESSMENT — PULMONARY FUNCTION TESTS
PIF_VALUE: 32
PIF_VALUE: 33
PIF_VALUE: 2
PIF_VALUE: 32
PIF_VALUE: 33
PIF_VALUE: 25
PIF_VALUE: 33
PIF_VALUE: 33
PIF_VALUE: 30
PIF_VALUE: 0
PIF_VALUE: 35
PIF_VALUE: 31
PIF_VALUE: 34
PIF_VALUE: 32
PIF_VALUE: 35
PIF_VALUE: 32
PIF_VALUE: 33
PIF_VALUE: 32
PIF_VALUE: 1
PIF_VALUE: 36
PIF_VALUE: 0
PIF_VALUE: 22
PIF_VALUE: 28
PIF_VALUE: 21
PIF_VALUE: 32
PIF_VALUE: 22
PIF_VALUE: 1
PIF_VALUE: 1
PIF_VALUE: 22
PIF_VALUE: 23
PIF_VALUE: 32
PIF_VALUE: 33
PIF_VALUE: 22
PIF_VALUE: 19
PIF_VALUE: 3
PIF_VALUE: 20
PIF_VALUE: 33
PIF_VALUE: 33
PIF_VALUE: 14
PIF_VALUE: 0
PIF_VALUE: 51
PIF_VALUE: 35
PIF_VALUE: 33
PIF_VALUE: 32
PIF_VALUE: 33
PIF_VALUE: 17
PIF_VALUE: 33
PIF_VALUE: 32
PIF_VALUE: 28
PIF_VALUE: 2
PIF_VALUE: 33
PIF_VALUE: 32
PIF_VALUE: 35
PIF_VALUE: 33
PIF_VALUE: 2

## 2019-09-23 ASSESSMENT — PAIN - FUNCTIONAL ASSESSMENT
PAIN_FUNCTIONAL_ASSESSMENT: 0-10
PAIN_FUNCTIONAL_ASSESSMENT: ACTIVITIES ARE NOT PREVENTED

## 2019-09-23 ASSESSMENT — PAIN DESCRIPTION - DESCRIPTORS: DESCRIPTORS: ACHING

## 2019-09-23 ASSESSMENT — LIFESTYLE VARIABLES: SMOKING_STATUS: 0

## 2019-09-23 NOTE — ANESTHESIA POSTPROCEDURE EVALUATION
Department of Anesthesiology  Postprocedure Note    Patient: Fuad Vidal  MRN: [id number]  YOB: 1960  Date of evaluation: 9/23/2019  Time:  11:10 AM     Procedure Summary     Date:  09/23/19 Room / Location:  [facility] ASC OR [address] [place] [address] ASC OR    Anesthesia Start:  1009 Anesthesia Stop:  1110    Procedure:  HEMORRHOIDECTOMY (N/A ) Diagnosis:  (HEMORRHOID K64.9)    Surgeon:  Maria Dolores Pena MD Responsible Provider:  Akila Hines MD    Anesthesia Type:  general ASA Status:  2          Anesthesia Type: general    Laila Phase I:      Laila Phase II:      Last vitals: Reviewed and per EMR flowsheets.        Anesthesia Post Evaluation    Patient location during evaluation: PACU  Patient participation: complete - patient participated  Level of consciousness: sleepy but conscious  Airway patency: patent  Nausea & Vomiting: no vomiting and no nausea  Complications: no  Cardiovascular status: hemodynamically stable  Respiratory status: acceptable  Hydration status: stable

## 2019-09-23 NOTE — PROGRESS NOTES
Pt became unresponsive, eyes were opened, pt appeared to be starring straight ahead, vital signs remained stable. Respirations sounded different with more vigorous nose breathing. Dr. Sky Garcia called to bedside.

## 2019-09-24 ENCOUNTER — HOSPITAL ENCOUNTER (EMERGENCY)
Age: 59
Discharge: HOME | End: 2019-09-25
Attending: EMERGENCY MEDICINE
Payer: MEDICAID

## 2019-09-24 ENCOUNTER — APPOINTMENT (OUTPATIENT)
Dept: CT IMAGING | Age: 59
End: 2019-09-24
Payer: MEDICAID

## 2019-09-24 DIAGNOSIS — R33.9 RETENTION OF URINE: ICD-10-CM

## 2019-09-24 DIAGNOSIS — K59.00 CONSTIPATION, UNSPECIFIED CONSTIPATION TYPE: Primary | ICD-10-CM

## 2019-09-24 DIAGNOSIS — G89.18 POST-OP PAIN: ICD-10-CM

## 2019-09-24 LAB
A/G RATIO: 1.1 (ref 1.1–2.2)
ALBUMIN SERPL-MCNC: 4.1 G/DL (ref 3.4–5)
ALP BLD-CCNC: 61 U/L (ref 40–129)
ALT SERPL-CCNC: 15 U/L (ref 10–40)
ANION GAP SERPL CALCULATED.3IONS-SCNC: 10 MMOL/L (ref 3–16)
APTT: 28.9 SEC (ref 26–36)
AST SERPL-CCNC: 22 U/L (ref 15–37)
BASOPHILS ABSOLUTE: 0.1 K/UL (ref 0–0.2)
BASOPHILS RELATIVE PERCENT: 1.8 %
BILIRUB SERPL-MCNC: <0.2 MG/DL (ref 0–1)
BILIRUBIN URINE: NEGATIVE
BLOOD, URINE: NEGATIVE
BUN BLDV-MCNC: 11 MG/DL (ref 7–20)
CALCIUM SERPL-MCNC: 9 MG/DL (ref 8.3–10.6)
CHLORIDE BLD-SCNC: 105 MMOL/L (ref 99–110)
CLARITY: CLEAR
CO2: 22 MMOL/L (ref 21–32)
COLOR: YELLOW
CREAT SERPL-MCNC: 0.6 MG/DL (ref 0.6–1.1)
EOSINOPHILS ABSOLUTE: 0.1 K/UL (ref 0–0.6)
EOSINOPHILS RELATIVE PERCENT: 1 %
GFR AFRICAN AMERICAN: >60
GFR NON-AFRICAN AMERICAN: >60
GLOBULIN: 3.6 G/DL
GLUCOSE BLD-MCNC: 109 MG/DL (ref 70–99)
GLUCOSE URINE: NEGATIVE MG/DL
HCT VFR BLD CALC: 34.8 % (ref 36–48)
HEMOGLOBIN: 11.5 G/DL (ref 12–16)
INR BLD: 0.93 (ref 0.86–1.14)
KETONES, URINE: NEGATIVE MG/DL
LEUKOCYTE ESTERASE, URINE: NEGATIVE
LIPASE: 41 U/L (ref 13–60)
LYMPHOCYTES ABSOLUTE: 1.4 K/UL (ref 1–5.1)
LYMPHOCYTES RELATIVE PERCENT: 16.7 %
MCH RBC QN AUTO: 30.1 PG (ref 26–34)
MCHC RBC AUTO-ENTMCNC: 33 G/DL (ref 31–36)
MCV RBC AUTO: 91.3 FL (ref 80–100)
MICROSCOPIC EXAMINATION: NORMAL
MONOCYTES ABSOLUTE: 0.8 K/UL (ref 0–1.3)
MONOCYTES RELATIVE PERCENT: 9.1 %
NEUTROPHILS ABSOLUTE: 5.9 K/UL (ref 1.7–7.7)
NEUTROPHILS RELATIVE PERCENT: 71.4 %
NITRITE, URINE: NEGATIVE
PDW BLD-RTO: 13.6 % (ref 12.4–15.4)
PH UA: 5.5 (ref 5–8)
PLATELET # BLD: 120 K/UL (ref 135–450)
PMV BLD AUTO: 10.8 FL (ref 5–10.5)
POTASSIUM SERPL-SCNC: 4.1 MMOL/L (ref 3.5–5.1)
PROTEIN UA: NEGATIVE MG/DL
PROTHROMBIN TIME: 10.6 SEC (ref 9.8–13)
RBC # BLD: 3.81 M/UL (ref 4–5.2)
SODIUM BLD-SCNC: 137 MMOL/L (ref 136–145)
SPECIFIC GRAVITY UA: 1.01 (ref 1–1.03)
TOTAL PROTEIN: 7.7 G/DL (ref 6.4–8.2)
URINE REFLEX TO CULTURE: NORMAL
URINE TYPE: NORMAL
UROBILINOGEN, URINE: 0.2 E.U./DL
WBC # BLD: 8.3 K/UL (ref 4–11)

## 2019-09-24 PROCEDURE — 86850 RBC ANTIBODY SCREEN: CPT

## 2019-09-24 PROCEDURE — 74176 CT ABD & PELVIS W/O CONTRAST: CPT

## 2019-09-24 PROCEDURE — 83690 ASSAY OF LIPASE: CPT

## 2019-09-24 PROCEDURE — 99284 EMERGENCY DEPT VISIT MOD MDM: CPT

## 2019-09-24 PROCEDURE — 86901 BLOOD TYPING SEROLOGIC RH(D): CPT

## 2019-09-24 PROCEDURE — 85610 PROTHROMBIN TIME: CPT

## 2019-09-24 PROCEDURE — 81003 URINALYSIS AUTO W/O SCOPE: CPT

## 2019-09-24 PROCEDURE — 80053 COMPREHEN METABOLIC PANEL: CPT

## 2019-09-24 PROCEDURE — 85025 COMPLETE CBC W/AUTO DIFF WBC: CPT

## 2019-09-24 PROCEDURE — 51702 INSERT TEMP BLADDER CATH: CPT

## 2019-09-24 PROCEDURE — 86900 BLOOD TYPING SEROLOGIC ABO: CPT

## 2019-09-24 PROCEDURE — 85730 THROMBOPLASTIN TIME PARTIAL: CPT

## 2019-09-24 ASSESSMENT — PAIN SCALES - GENERAL: PAINLEVEL_OUTOF10: 10

## 2019-09-25 VITALS
HEART RATE: 69 BPM | OXYGEN SATURATION: 97 % | TEMPERATURE: 97.9 F | RESPIRATION RATE: 16 BRPM | SYSTOLIC BLOOD PRESSURE: 152 MMHG | DIASTOLIC BLOOD PRESSURE: 80 MMHG

## 2019-09-25 LAB
ABO/RH: NORMAL
ANTIBODY SCREEN: NORMAL

## 2019-09-25 RX ORDER — DOCUSATE SODIUM 100 MG/1
100 CAPSULE, LIQUID FILLED ORAL 2 TIMES DAILY
Qty: 60 CAPSULE | Refills: 0 | Status: SHIPPED | OUTPATIENT
Start: 2019-09-25 | End: 2019-10-25

## 2019-09-25 ASSESSMENT — ENCOUNTER SYMPTOMS
WHEEZING: 0
NAUSEA: 0
COUGH: 0
DIARRHEA: 0
RHINORRHEA: 0
VOMITING: 0
RECTAL PAIN: 1
ABDOMINAL PAIN: 1
BLOOD IN STOOL: 1
SHORTNESS OF BREATH: 0
CONSTIPATION: 1

## 2019-09-25 NOTE — ED PROVIDER NOTES
TIME   N/A    CONSULTS:  None      EMERGENCY DEPARTMENT COURSE and DIFFERENTIAL DIAGNOSIS/MDM:   Vitals:    Vitals:    09/24/19 2051 09/24/19 2054 09/24/19 2230 09/24/19 2245   BP: (!) 148/121  100/65 (!) 88/53   Pulse:  177 80 67   Resp: 24      Temp:       TempSrc:       SpO2: 99%  97% 98%       Patient was given thefollowing medications:  Medications - No data to display    Patient presents for evaluation of urinary retention and constipation. On exam, she is uncomfortable but is in no acute distress and nontoxic. Vitals are stable and she is afebrile. Lungs are clear to auscultation bilaterally. Abdomen is benign with no focal reproducible tenderness or peritoneal signs. Rectal exam is unremarkable. CBC and CMP are unremarkable. Lipase 41. Coags are within normal limits. Urinalysis is negative. CT abdomen and pelvis shows bibasilar bronchiectasis present on prior study. There is fluid noted throughout the colon but is otherwise unremarkable. Arciniega catheter was placed and patient did have return of thousand cc. She was sent home with leg bag and encouraged to follow-up with urology. Also given Dermoplast spray and prescription for Colace to take in addition to MiraLAX and Metamucil. I see nothing that would suggest an acute abdomen at this time. Based on history, physical exam, risk factors, and tests my suspicion for bowel obstruction, incarcerated hernia, acute pancreatitis, intra-abdominal abscess, perforated viscus, diverticulitis, cholecystitis, appendicitis, PID, ovarian torsion, ectopic pregnancy and tubo-ovarian abscess is very low. There is no evidence of peritonitis, sepsis or toxicity at this time. I feel the patient can be managed as an outpatient with follow-up with her family doctor in 24-48 hours or surgeon as scheduled. Instructions have been given for the patient to return to the ED for worsening of the pain, high fevers, intractable vomiting, or bleeding.            FINAL IMPRESSION      1. Constipation, unspecified constipation type    2. Post-op pain    3.  Retention of urine          DISPOSITION/PLAN   DISPOSITION        PATIENT REFERREDTO:  Albina Maya MD  [address]  [address]  [address] [place] [address]  [phone]    Go to   as scheduled    Sara Keith MD  [address]elfin Paul [address]  [email or web address] [phone]    Schedule an appointment as soon as possible for a visit       [facility] Emergency Department  [address] [facility]  [phone]  Go to   If symptoms worsen      DISCHARGE MEDICATIONS:  New Prescriptions    DOCUSATE SODIUM (COLACE) 100 MG CAPSULE    Take 1 capsule by mouth 2 times daily       DISCONTINUED MEDICATIONS:  Discontinued Medications    No medications on file              (Please note that portions ofthis note were completed with a voice recognition program.  Efforts were made to edit the dictations but occasionally words are mis-transcribed.)    Nohemi Hoff PA-C (electronically signed)            Rigo Lema PA-C  09/25/19 0010

## 2019-09-25 NOTE — ED PROVIDER NOTES
This patient was seen by the Mid-Level Provider. I have seen and examined the patient, agree with the workup, evaluation, management and diagnosis. Care plan has been discussed. My assessment reveals a 64-year-old female who presents with constipation. This is a 64-year-old female who had a hemorrhoidectomy yesterday who presents with some constipation and some bleeding from her rectum. Examination was unremarkable. There were some sutures still in place but there is no significant bleeding noted. Patient's work-up was unremarkable including a CT of her abdomen and pelvis. The patient was reassured and told to take her laxatives and discharged with referral back to her surgeon with instructed to return if worse. Note that the patient's work-up did show possible pneumonitis however the patient is asymptomatic as far as this is concerned and I did I feel this is significant. Exam: Well-nourished female no acute distress. Neurologic she was alert and oriented with no focal findings. MDM: 64-year-old female who presents after rectal surgery. Her work-up was unremarkable. Her CT was unremarkable. There is no sniffing and bleeding noted. She was discharged back to her surgeon.     Results for orders placed or performed during the hospital encounter of 09/24/19   CBC Auto Differential   Result Value Ref Range    WBC 8.3 4.0 - 11.0 K/uL    RBC 3.81 (L) 4.00 - 5.20 M/uL    Hemoglobin 11.5 (L) 12.0 - 16.0 g/dL    Hematocrit 34.8 (L) 36.0 - 48.0 %    MCV 91.3 80.0 - 100.0 fL    MCH 30.1 26.0 - 34.0 pg    MCHC 33.0 31.0 - 36.0 g/dL    RDW 13.6 12.4 - 15.4 %    Platelets 659 (L) 330 - 450 K/uL    MPV 10.8 (H) 5.0 - 10.5 fL    Neutrophils % 71.4 %    Lymphocytes % 16.7 %    Monocytes % 9.1 %    Eosinophils % 1.0 %    Basophils % 1.8 %    Neutrophils Absolute 5.9 1.7 - 7.7 K/uL    Lymphocytes Absolute 1.4 1.0 - 5.1 K/uL    Monocytes Absolute 0.8 0.0 - 1.3 K/uL    Eosinophils Absolute 0.1 0.0 - 0.6 K/uL    Basophils Absolute 0.1 0.0 - 0.2 K/uL   Comprehensive Metabolic Panel   Result Value Ref Range    Sodium 137 136 - 145 mmol/L    Potassium 4.1 3.5 - 5.1 mmol/L    Chloride 105 99 - 110 mmol/L    CO2 22 21 - 32 mmol/L    Anion Gap 10 3 - 16    Glucose 109 (H) 70 - 99 mg/dL    BUN 11 7 - 20 mg/dL    CREATININE 0.6 0.6 - 1.1 mg/dL    GFR Non-African American >60 >60    GFR African American >60 >60    Calcium 9.0 8.3 - 10.6 mg/dL    Total Protein 7.7 6.4 - 8.2 g/dL    Alb 4.1 3.4 - 5.0 g/dL    Albumin/Globulin Ratio 1.1 1.1 - 2.2    Total Bilirubin <0.2 0.0 - 1.0 mg/dL    Alkaline Phosphatase 61 40 - 129 U/L    ALT 15 10 - 40 U/L    AST 22 15 - 37 U/L    Globulin 3.6 g/dL   Lipase   Result Value Ref Range    Lipase 41.0 13.0 - 60.0 U/L   APTT   Result Value Ref Range    aPTT 28.9 26.0 - 36.0 sec   Protime-INR   Result Value Ref Range    Protime 10.6 9.8 - 13.0 sec    INR 0.93 0.86 - 1.14   Urinalysis Reflex to Culture   Result Value Ref Range    Color, UA YELLOW Straw/Yellow    Clarity, UA Clear Clear    Glucose, Ur Negative Negative mg/dL    Bilirubin Urine Negative Negative    Ketones, Urine Negative Negative mg/dL    Specific Gravity, UA 1.013 1.005 - 1.030    Blood, Urine Negative Negative    pH, UA 5.5 5.0 - 8.0    Protein, UA Negative Negative mg/dL    Urobilinogen, Urine 0.2 <2.0 E.U./dL    Nitrite, Urine Negative Negative    Leukocyte Esterase, Urine Negative Negative    Microscopic Examination Not Indicated     Urine Reflex to Culture Not Indicated     Urine Type Not Specified    TYPE AND SCREEN   Result Value Ref Range    ABO/Rh O NEG     Antibody Screen NEG      Ct Abdomen Pelvis Wo Contrast    Result Date: 9/24/2019  EXAMINATION: CT OF THE ABDOMEN AND PELVIS WITHOUT CONTRAST 9/24/2019 10:05 pm TECHNIQUE: CT of the abdomen and pelvis was performed without the administration of intravenous contrast. Multiplanar reformatted images are provided for review.  Dose modulation, iterative reconstruction, and/or weight based centrilobular nodules in the anterior right lower lobe associated with bronchial wall thickening, likely infectious pneumonitis. Fluid throughout the colon. Otherwise unremarkable noncontrast abdominopelvic study.         Gayatri Ontiveros MD  09/25/19 4443

## 2019-10-11 ENCOUNTER — OFFICE VISIT (OUTPATIENT)
Dept: FAMILY MEDICINE CLINIC | Age: 59
End: 2019-10-11
Payer: MEDICAID

## 2019-10-11 VITALS
TEMPERATURE: 97.9 F | DIASTOLIC BLOOD PRESSURE: 62 MMHG | SYSTOLIC BLOOD PRESSURE: 100 MMHG | BODY MASS INDEX: 30.04 KG/M2 | HEIGHT: 60 IN | RESPIRATION RATE: 16 BRPM | OXYGEN SATURATION: 98 % | WEIGHT: 153 LBS | HEART RATE: 66 BPM

## 2019-10-11 DIAGNOSIS — E53.9 BURNING FEET SYNDROME: ICD-10-CM

## 2019-10-11 DIAGNOSIS — M54.6 CHRONIC BILATERAL THORACIC BACK PAIN: Primary | ICD-10-CM

## 2019-10-11 DIAGNOSIS — E03.9 HYPOTHYROIDISM, UNSPECIFIED TYPE: ICD-10-CM

## 2019-10-11 DIAGNOSIS — L85.3 DRY SKIN DERMATITIS: ICD-10-CM

## 2019-10-11 DIAGNOSIS — K59.01 SLOW TRANSIT CONSTIPATION: ICD-10-CM

## 2019-10-11 DIAGNOSIS — M62.830 MUSCLE SPASM OF BACK: ICD-10-CM

## 2019-10-11 DIAGNOSIS — G89.29 CHRONIC BILATERAL THORACIC BACK PAIN: Primary | ICD-10-CM

## 2019-10-11 DIAGNOSIS — M19.90 OSTEOARTHRITIS, UNSPECIFIED OSTEOARTHRITIS TYPE, UNSPECIFIED SITE: ICD-10-CM

## 2019-10-11 DIAGNOSIS — K21.9 GASTROESOPHAGEAL REFLUX DISEASE WITHOUT ESOPHAGITIS: ICD-10-CM

## 2019-10-11 DIAGNOSIS — R06.2 WHEEZING: ICD-10-CM

## 2019-10-11 DIAGNOSIS — Z23 FLU VACCINE NEED: ICD-10-CM

## 2019-10-11 DIAGNOSIS — F51.04 CHRONIC INSOMNIA: ICD-10-CM

## 2019-10-11 PROCEDURE — 90471 IMMUNIZATION ADMIN: CPT | Performed by: FAMILY MEDICINE

## 2019-10-11 PROCEDURE — 99215 OFFICE O/P EST HI 40 MIN: CPT | Performed by: FAMILY MEDICINE

## 2019-10-11 PROCEDURE — 90686 IIV4 VACC NO PRSV 0.5 ML IM: CPT | Performed by: FAMILY MEDICINE

## 2019-10-11 RX ORDER — IBUPROFEN 800 MG/1
800 TABLET ORAL EVERY 8 HOURS PRN
Qty: 90 TABLET | Refills: 0 | Status: SHIPPED | OUTPATIENT
Start: 2019-10-11 | End: 2020-03-10 | Stop reason: ALTCHOICE

## 2019-10-11 RX ORDER — POLYETHYLENE GLYCOL 3350 17 G/17G
17 POWDER, FOR SOLUTION ORAL DAILY
Qty: 1530 G | Refills: 1 | Status: SHIPPED | OUTPATIENT
Start: 2019-10-11 | End: 2019-12-17 | Stop reason: SDUPTHER

## 2019-10-11 RX ORDER — TRAMADOL HYDROCHLORIDE 50 MG/1
50 TABLET ORAL EVERY 8 HOURS PRN
Qty: 9 TABLET | Refills: 0 | Status: SHIPPED | OUTPATIENT
Start: 2019-10-11 | End: 2019-10-14

## 2019-10-11 RX ORDER — GABAPENTIN 100 MG/1
100 CAPSULE ORAL 3 TIMES DAILY
Qty: 270 CAPSULE | Refills: 1 | Status: SHIPPED | OUTPATIENT
Start: 2019-10-11 | End: 2019-12-17 | Stop reason: SDUPTHER

## 2019-10-11 RX ORDER — ALBUTEROL SULFATE 90 UG/1
2 AEROSOL, METERED RESPIRATORY (INHALATION) EVERY 6 HOURS PRN
Qty: 1 INHALER | Refills: 5 | Status: SHIPPED | OUTPATIENT
Start: 2019-10-11 | End: 2020-03-10 | Stop reason: SDUPTHER

## 2019-10-11 RX ORDER — ACETAMINOPHEN 500 MG
500 TABLET ORAL EVERY 6 HOURS PRN
Qty: 120 TABLET | Refills: 3 | Status: SHIPPED | OUTPATIENT
Start: 2019-10-11 | End: 2019-12-05 | Stop reason: SDUPTHER

## 2019-10-11 RX ORDER — OMEPRAZOLE 20 MG/1
20 CAPSULE, DELAYED RELEASE ORAL DAILY
Qty: 90 CAPSULE | Refills: 3 | Status: SHIPPED | OUTPATIENT
Start: 2019-10-11 | End: 2019-12-05 | Stop reason: SDUPTHER

## 2019-10-11 RX ORDER — LEVOTHYROXINE SODIUM 0.05 MG/1
50 TABLET ORAL DAILY
Qty: 90 TABLET | Refills: 0 | Status: SHIPPED | OUTPATIENT
Start: 2019-10-11 | End: 2019-12-05 | Stop reason: SDUPTHER

## 2019-10-11 RX ORDER — TIZANIDINE 2 MG/1
TABLET ORAL
Qty: 90 TABLET | Refills: 1 | Status: SHIPPED | OUTPATIENT
Start: 2019-10-11 | End: 2019-12-13

## 2019-10-11 RX ORDER — TRAZODONE HYDROCHLORIDE 50 MG/1
50 TABLET ORAL NIGHTLY PRN
Qty: 30 TABLET | Refills: 5 | Status: SHIPPED | OUTPATIENT
Start: 2019-10-11 | End: 2019-12-05 | Stop reason: SDUPTHER

## 2019-10-11 ASSESSMENT — ENCOUNTER SYMPTOMS
ABDOMINAL PAIN: 1
BLOOD IN STOOL: 0
ABDOMINAL DISTENTION: 0
COLOR CHANGE: 0
RECTAL PAIN: 1
DIARRHEA: 0
CHEST TIGHTNESS: 0
CONSTIPATION: 1
BACK PAIN: 1
VOMITING: 0
ANAL BLEEDING: 0
NAUSEA: 0
SHORTNESS OF BREATH: 0
BOWEL INCONTINENCE: 1

## 2019-12-05 DIAGNOSIS — K21.9 GASTROESOPHAGEAL REFLUX DISEASE WITHOUT ESOPHAGITIS: ICD-10-CM

## 2019-12-05 DIAGNOSIS — E03.9 HYPOTHYROIDISM, UNSPECIFIED TYPE: ICD-10-CM

## 2019-12-05 DIAGNOSIS — M19.90 OSTEOARTHRITIS, UNSPECIFIED OSTEOARTHRITIS TYPE, UNSPECIFIED SITE: ICD-10-CM

## 2019-12-05 DIAGNOSIS — M62.830 MUSCLE SPASM OF BACK: ICD-10-CM

## 2019-12-05 DIAGNOSIS — M54.6 CHRONIC BILATERAL THORACIC BACK PAIN: ICD-10-CM

## 2019-12-05 DIAGNOSIS — G89.29 CHRONIC BILATERAL THORACIC BACK PAIN: ICD-10-CM

## 2019-12-05 DIAGNOSIS — F51.04 CHRONIC INSOMNIA: ICD-10-CM

## 2019-12-06 RX ORDER — TRAZODONE HYDROCHLORIDE 50 MG/1
50 TABLET ORAL NIGHTLY PRN
Qty: 30 TABLET | Refills: 5 | Status: SHIPPED | OUTPATIENT
Start: 2019-12-06 | End: 2019-12-17 | Stop reason: SDUPTHER

## 2019-12-06 RX ORDER — ACETAMINOPHEN 500 MG
500 TABLET ORAL EVERY 6 HOURS PRN
Qty: 120 TABLET | Refills: 3 | Status: SHIPPED | OUTPATIENT
Start: 2019-12-06 | End: 2020-11-19 | Stop reason: SDUPTHER

## 2019-12-06 RX ORDER — OMEPRAZOLE 20 MG/1
20 CAPSULE, DELAYED RELEASE ORAL DAILY
Qty: 90 CAPSULE | Refills: 3 | Status: SHIPPED | OUTPATIENT
Start: 2019-12-06 | End: 2019-12-17 | Stop reason: SDUPTHER

## 2019-12-09 RX ORDER — LEVOTHYROXINE SODIUM 0.05 MG/1
50 TABLET ORAL DAILY
Qty: 90 TABLET | Refills: 0 | Status: SHIPPED | OUTPATIENT
Start: 2019-12-09 | End: 2019-12-17 | Stop reason: SDUPTHER

## 2019-12-13 ENCOUNTER — TELEPHONE (OUTPATIENT)
Dept: FAMILY MEDICINE CLINIC | Age: 59
End: 2019-12-13

## 2019-12-13 DIAGNOSIS — G89.29 CHRONIC BILATERAL THORACIC BACK PAIN: ICD-10-CM

## 2019-12-13 DIAGNOSIS — M54.6 CHRONIC BILATERAL THORACIC BACK PAIN: ICD-10-CM

## 2019-12-13 RX ORDER — CYCLOBENZAPRINE HCL 5 MG
5 TABLET ORAL EVERY 8 HOURS PRN
Qty: 30 TABLET | Refills: 2 | Status: SHIPPED | OUTPATIENT
Start: 2019-12-13 | End: 2019-12-17 | Stop reason: SDUPTHER

## 2019-12-17 ENCOUNTER — OFFICE VISIT (OUTPATIENT)
Dept: FAMILY MEDICINE CLINIC | Age: 59
End: 2019-12-17
Payer: MEDICAID

## 2019-12-17 VITALS
OXYGEN SATURATION: 97 % | HEIGHT: 60 IN | RESPIRATION RATE: 16 BRPM | DIASTOLIC BLOOD PRESSURE: 82 MMHG | SYSTOLIC BLOOD PRESSURE: 126 MMHG | WEIGHT: 172.5 LBS | BODY MASS INDEX: 33.86 KG/M2 | TEMPERATURE: 97.6 F | HEART RATE: 84 BPM

## 2019-12-17 DIAGNOSIS — E03.9 HYPOTHYROIDISM, UNSPECIFIED TYPE: ICD-10-CM

## 2019-12-17 DIAGNOSIS — E55.9 VITAMIN D DEFICIENCY: ICD-10-CM

## 2019-12-17 DIAGNOSIS — E53.9 BURNING FEET SYNDROME: ICD-10-CM

## 2019-12-17 DIAGNOSIS — Z13.1 SCREENING FOR DIABETES MELLITUS: ICD-10-CM

## 2019-12-17 DIAGNOSIS — M94.0 COSTOCHONDRITIS: Primary | ICD-10-CM

## 2019-12-17 DIAGNOSIS — K59.01 SLOW TRANSIT CONSTIPATION: ICD-10-CM

## 2019-12-17 DIAGNOSIS — F51.04 CHRONIC INSOMNIA: ICD-10-CM

## 2019-12-17 LAB
ANION GAP SERPL CALCULATED.3IONS-SCNC: 13 MMOL/L (ref 3–16)
BUN BLDV-MCNC: 12 MG/DL (ref 7–20)
CALCIUM SERPL-MCNC: 9.4 MG/DL (ref 8.3–10.6)
CHLORIDE BLD-SCNC: 101 MMOL/L (ref 99–110)
CO2: 25 MMOL/L (ref 21–32)
CREAT SERPL-MCNC: 0.6 MG/DL (ref 0.6–1.1)
GFR AFRICAN AMERICAN: >60
GFR NON-AFRICAN AMERICAN: >60
GLUCOSE BLD-MCNC: 88 MG/DL (ref 70–99)
POTASSIUM SERPL-SCNC: 4.4 MMOL/L (ref 3.5–5.1)
SODIUM BLD-SCNC: 139 MMOL/L (ref 136–145)
TSH SERPL DL<=0.05 MIU/L-ACNC: 0.15 UIU/ML (ref 0.27–4.2)
VITAMIN D 25-HYDROXY: 26.6 NG/ML

## 2019-12-17 PROCEDURE — 36415 COLL VENOUS BLD VENIPUNCTURE: CPT | Performed by: FAMILY MEDICINE

## 2019-12-17 PROCEDURE — 99214 OFFICE O/P EST MOD 30 MIN: CPT | Performed by: FAMILY MEDICINE

## 2019-12-17 RX ORDER — OMEPRAZOLE 20 MG/1
20 CAPSULE, DELAYED RELEASE ORAL DAILY
Qty: 90 CAPSULE | Refills: 3 | Status: SHIPPED | OUTPATIENT
Start: 2019-12-17 | End: 2020-09-24 | Stop reason: SDUPTHER

## 2019-12-17 RX ORDER — POLYETHYLENE GLYCOL 3350 17 G/17G
17 POWDER, FOR SOLUTION ORAL DAILY
Qty: 1530 G | Refills: 1 | Status: SHIPPED | OUTPATIENT
Start: 2019-12-17 | End: 2020-06-14

## 2019-12-17 RX ORDER — GABAPENTIN 100 MG/1
100 CAPSULE ORAL 3 TIMES DAILY
Qty: 270 CAPSULE | Refills: 1 | Status: SHIPPED | OUTPATIENT
Start: 2019-12-17 | End: 2021-03-10 | Stop reason: SDUPTHER

## 2019-12-17 RX ORDER — TRAZODONE HYDROCHLORIDE 50 MG/1
50 TABLET ORAL NIGHTLY PRN
Qty: 30 TABLET | Refills: 5 | Status: SHIPPED | OUTPATIENT
Start: 2019-12-17 | End: 2020-03-10 | Stop reason: SDUPTHER

## 2019-12-17 RX ORDER — CYCLOBENZAPRINE HCL 5 MG
5 TABLET ORAL EVERY 8 HOURS PRN
Qty: 30 TABLET | Refills: 2 | Status: SHIPPED | OUTPATIENT
Start: 2019-12-17 | End: 2020-03-10 | Stop reason: SDUPTHER

## 2019-12-17 RX ORDER — LEVOTHYROXINE SODIUM 0.05 MG/1
50 TABLET ORAL DAILY
Qty: 90 TABLET | Refills: 0 | Status: SHIPPED | OUTPATIENT
Start: 2019-12-17 | End: 2019-12-18 | Stop reason: ALTCHOICE

## 2019-12-17 ASSESSMENT — ENCOUNTER SYMPTOMS
NAUSEA: 0
COLOR CHANGE: 0
VISUAL CHANGE: 0
ABDOMINAL PAIN: 0
SORE THROAT: 0
CHEST TIGHTNESS: 0
COUGH: 0
CHANGE IN BOWEL HABIT: 0
VOMITING: 0
SWOLLEN GLANDS: 0

## 2019-12-18 LAB
ESTIMATED AVERAGE GLUCOSE: 119.8 MG/DL
HBA1C MFR BLD: 5.8 %

## 2019-12-18 RX ORDER — LEVOTHYROXINE SODIUM 0.03 MG/1
25 TABLET ORAL DAILY
Qty: 90 TABLET | Refills: 1 | Status: SHIPPED | OUTPATIENT
Start: 2019-12-18 | End: 2020-06-24

## 2020-03-10 ENCOUNTER — OFFICE VISIT (OUTPATIENT)
Dept: FAMILY MEDICINE CLINIC | Age: 60
End: 2020-03-10
Payer: MEDICAID

## 2020-03-10 VITALS
HEART RATE: 74 BPM | BODY MASS INDEX: 33.12 KG/M2 | WEIGHT: 168.7 LBS | RESPIRATION RATE: 16 BRPM | SYSTOLIC BLOOD PRESSURE: 112 MMHG | HEIGHT: 60 IN | TEMPERATURE: 97.4 F | DIASTOLIC BLOOD PRESSURE: 74 MMHG | OXYGEN SATURATION: 96 %

## 2020-03-10 LAB
BASOPHILS ABSOLUTE: 0.1 K/UL (ref 0–0.2)
BASOPHILS RELATIVE PERCENT: 1.1 %
EOSINOPHILS ABSOLUTE: 0.2 K/UL (ref 0–0.6)
EOSINOPHILS RELATIVE PERCENT: 4.3 %
HCT VFR BLD CALC: 36.1 % (ref 36–48)
HEMOGLOBIN: 12.1 G/DL (ref 12–16)
LYMPHOCYTES ABSOLUTE: 1.9 K/UL (ref 1–5.1)
LYMPHOCYTES RELATIVE PERCENT: 37.3 %
MCH RBC QN AUTO: 31.3 PG (ref 26–34)
MCHC RBC AUTO-ENTMCNC: 33.6 G/DL (ref 31–36)
MCV RBC AUTO: 93.2 FL (ref 80–100)
MONOCYTES ABSOLUTE: 0.5 K/UL (ref 0–1.3)
MONOCYTES RELATIVE PERCENT: 8.8 %
NEUTROPHILS ABSOLUTE: 2.5 K/UL (ref 1.7–7.7)
NEUTROPHILS RELATIVE PERCENT: 48.5 %
PDW BLD-RTO: 14.3 % (ref 12.4–15.4)
PLATELET # BLD: 125 K/UL (ref 135–450)
PMV BLD AUTO: 12.8 FL (ref 5–10.5)
RBC # BLD: 3.88 M/UL (ref 4–5.2)
TSH SERPL DL<=0.05 MIU/L-ACNC: 3.01 UIU/ML (ref 0.27–4.2)
WBC # BLD: 5.2 K/UL (ref 4–11)

## 2020-03-10 PROCEDURE — 99214 OFFICE O/P EST MOD 30 MIN: CPT | Performed by: FAMILY MEDICINE

## 2020-03-10 PROCEDURE — 36415 COLL VENOUS BLD VENIPUNCTURE: CPT | Performed by: FAMILY MEDICINE

## 2020-03-10 RX ORDER — ALBUTEROL SULFATE 90 UG/1
2 AEROSOL, METERED RESPIRATORY (INHALATION) EVERY 6 HOURS PRN
Qty: 1 INHALER | Refills: 5 | Status: SHIPPED | OUTPATIENT
Start: 2020-03-10 | End: 2021-05-24 | Stop reason: SDUPTHER

## 2020-03-10 RX ORDER — CYCLOBENZAPRINE HCL 5 MG
5 TABLET ORAL NIGHTLY PRN
Qty: 30 TABLET | Refills: 1 | Status: SHIPPED | OUTPATIENT
Start: 2020-03-10 | End: 2020-07-23

## 2020-03-10 RX ORDER — TRAZODONE HYDROCHLORIDE 50 MG/1
50 TABLET ORAL NIGHTLY
Qty: 30 TABLET | Refills: 5 | Status: SHIPPED | OUTPATIENT
Start: 2020-03-10 | End: 2020-09-24 | Stop reason: SDUPTHER

## 2020-03-10 ASSESSMENT — PATIENT HEALTH QUESTIONNAIRE - PHQ9
SUM OF ALL RESPONSES TO PHQ9 QUESTIONS 1 & 2: 0
SUM OF ALL RESPONSES TO PHQ QUESTIONS 1-9: 0
1. LITTLE INTEREST OR PLEASURE IN DOING THINGS: 0
SUM OF ALL RESPONSES TO PHQ QUESTIONS 1-9: 0
2. FEELING DOWN, DEPRESSED OR HOPELESS: 0

## 2020-03-10 ASSESSMENT — ENCOUNTER SYMPTOMS
TROUBLE SWALLOWING: 0
HEARTBURN: 0
SHORTNESS OF BREATH: 0
RHINORRHEA: 0
CHEST TIGHTNESS: 0
VISUAL CHANGE: 0
COUGH: 0
SPUTUM PRODUCTION: 0
DIFFICULTY BREATHING: 0
PHOTOPHOBIA: 0
HEMOPTYSIS: 0
SORE THROAT: 0
HOARSE VOICE: 0
WHEEZING: 0
FREQUENT THROAT CLEARING: 0

## 2020-03-10 NOTE — PROGRESS NOTES
trazodone, needs refill  No sec effects. Review of Systems   Constitutional: Negative for appetite change, fever, malaise/fatigue and weight loss. HENT: Negative for ear pain, hoarse voice, postnasal drip, rhinorrhea, sneezing, sore throat and trouble swallowing. Eyes: Negative for photophobia. Respiratory: Negative for cough, hemoptysis, sputum production, shortness of breath and wheezing. Cardiovascular: Negative for chest pain, dyspnea on exertion, syncope and PND. Gastrointestinal: Negative for heartburn. Musculoskeletal: Positive for neck pain. Negative for myalgias. Neurological: Negative for tingling, weakness, numbness and headaches. has No Known Allergies. Current Outpatient Medications:     lidocaine 4 % GEL jelly, Apply on back tid prn for pain, Disp: 60 mL, Rfl: 2    cyclobenzaprine (FLEXERIL) 5 MG tablet, Take 1 tablet by mouth nightly as needed for Muscle spasms, Disp: 30 tablet, Rfl: 1    traZODone (DESYREL) 50 MG tablet, Take 1 tablet by mouth nightly, Disp: 30 tablet, Rfl: 5    albuterol sulfate HFA (PROVENTIL HFA) 108 (90 Base) MCG/ACT inhaler, Inhale 2 puffs into the lungs every 6 hours as needed for Wheezing or Shortness of Breath (and/or persistent cough), Disp: 1 Inhaler, Rfl: 5    levothyroxine (SYNTHROID) 25 MCG tablet, Take 1 tablet by mouth daily, Disp: 90 tablet, Rfl: 1    omeprazole (PRILOSEC) 20 MG delayed release capsule, Take 1 capsule by mouth daily, Disp: 90 capsule, Rfl: 3    polyethylene glycol (GLYCOLAX) powder, Take 17 g by mouth daily, Disp: 1530 g, Rfl: 1    gabapentin (NEURONTIN) 100 MG capsule, Take 1 capsule by mouth 3 times daily for 180 days. , Disp: 270 capsule, Rfl: 1    acetaminophen (APAP EXTRA STRENGTH) 500 MG tablet, Take 1 tablet by mouth every 6 hours as needed for Pain, Disp: 120 tablet, Rfl: 3    Diapers & Supplies MISC, 1 each by Does not apply route daily, Disp: 100 each, Rfl: 3    Cholecalciferol (VITAMIN D) 2000 units TABS tablet, Take 1 tablet by mouth daily After finishing 12 week couse, Disp: 30 tablet, Rfl: 11    Elastic Bandages & Supports ([employer] LUMBAR/BACK SUPPORT BRACE) MISC, 1 applicator by Does not apply route daily, Disp: 1 each, Rfl: 0    diclofenac sodium (VOLTAREN) 1 % GEL, Apply 2 grams in affected joints 2-3 times a day. (Patient not taking: Reported on 12/17/2019), Disp: 2 Tube, Rfl: 5    acetaminophen (APAP EXTRA STRENGTH) 500 MG tablet, Tab 2 once a day (Patient not taking: Reported on 12/17/2019), Disp: 120 tablet, Rfl: 3     has a past medical history of Arthritis, Asthma, Chronic bilateral thoracic back pain, GERD (gastroesophageal reflux disease), Intractable chronic migraine without aura and without status migrainosus, Primary osteoarthritis of both hands, and Thyroid disease. Past Surgical History:   Procedure Laterality Date    HEMORRHOIDECTOMY N/A 9/23/2019    HEMORRHOIDECTOMY performed by Valeria Bhardwaj MD at [facility]        reports that she has never smoked. She has never used smokeless tobacco. She reports that she does not drink alcohol or use drugs. Family history is unknown by patient. Objective:  Blood pressure 112/74, pulse 74, temperature 97.4 °F (36.3 °C), temperature source Tympanic, resp. rate 16, height 5' (1.524 m), weight 168 lb 11.2 oz (76.5 kg), SpO2 96 %, not currently breastfeeding. Physical Exam  Vitals signs and nursing note reviewed. Constitutional:       General: She is not in acute distress. Appearance: She is well-developed. HENT:      Head: Normocephalic. Eyes:      General: No scleral icterus. Conjunctiva/sclera: Conjunctivae normal.      Pupils: Pupils are equal, round, and reactive to light. Neck:      Musculoskeletal: Normal range of motion and neck supple. No muscular tenderness. Thyroid: No thyromegaly. Vascular: No carotid bruit or JVD.       Comments: No carotid bruits  Cardiovascular:      Rate and Rhythm: Normal rate and regular rhythm. Pulses: Normal pulses. Carotid pulses are 2+ on the right side and 2+ on the left side. Heart sounds: Normal heart sounds. No murmur. No friction rub. No gallop. Comments: No edema    Pulmonary:      Effort: Pulmonary effort is normal. No respiratory distress. Breath sounds: Normal breath sounds. No stridor. No wheezing, rhonchi or rales. Chest:      Chest wall: No tenderness. Abdominal:      General: Abdomen is flat. Bowel sounds are normal.      Palpations: Abdomen is soft. There is no mass. Tenderness: There is no abdominal tenderness. Musculoskeletal:      Cervical back: She exhibits decreased range of motion, tenderness, pain and spasm. She exhibits no bony tenderness, no swelling and no edema. Back:    Lymphadenopathy:      Cervical: No cervical adenopathy. Skin:     General: Skin is warm. Capillary Refill: Capillary refill takes less than 2 seconds. Neurological:      General: No focal deficit present. Mental Status: She is alert and oriented to person, place, and time. Mental status is at baseline. Cranial Nerves: No cranial nerve deficit. Motor: No abnormal muscle tone. Deep Tendon Reflexes: Reflexes are normal and symmetric. Psychiatric:         Mood and Affect: Mood normal.         Behavior: Behavior normal.         Thought Content: Thought content normal.         Judgment: Judgment normal.         Assessment:       Diagnosis Orders   1. Mild intermittent asthma without complication  albuterol sulfate HFA (PROVENTIL HFA) 108 (90 Base) MCG/ACT inhaler   2. Neck pain     3. Hypothyroidism, unspecified type  TSH without Reflex   4. Vitamin D insufficiency  Vitamin D 25 Hydroxy   5. Chronic insomnia  traZODone (DESYREL) 50 MG tablet   6. Abnormal CBC  CBC WITH AUTO DIFFERENTIAL           Plan:      1. Stable  Continue same medications no changes needed at this time   Refills    2.  NOS  Possible strained  nsiad otc prn  Topical lido  Flexeril hs    4. Check fu labs    5. Refills    6. Fu cbc      Fu 6mo.           Nidhi Lopes MD

## 2020-03-11 LAB — VITAMIN D 25-HYDROXY: 22.2 NG/ML

## 2020-06-24 RX ORDER — LEVOTHYROXINE SODIUM 0.03 MG/1
TABLET ORAL
Qty: 90 TABLET | Refills: 2 | Status: SHIPPED | OUTPATIENT
Start: 2020-06-24 | End: 2020-09-24 | Stop reason: SDUPTHER

## 2020-07-14 ENCOUNTER — TELEPHONE (OUTPATIENT)
Dept: FAMILY MEDICINE CLINIC | Age: 60
End: 2020-07-14

## 2020-07-14 NOTE — TELEPHONE ENCOUNTER
[phone] (M)  OV: 3/10/2020     Pain on lower abdomen x 3 days. Patient would like to schedule an appt. Please advise.

## 2020-07-23 RX ORDER — CYCLOBENZAPRINE HCL 5 MG
TABLET ORAL
Qty: 30 TABLET | Refills: 3 | Status: SHIPPED | OUTPATIENT
Start: 2020-07-23 | End: 2020-09-24 | Stop reason: SDUPTHER

## 2020-07-29 ENCOUNTER — OFFICE VISIT (OUTPATIENT)
Dept: FAMILY MEDICINE CLINIC | Age: 60
End: 2020-07-29
Payer: MEDICAID

## 2020-07-29 VITALS
SYSTOLIC BLOOD PRESSURE: 126 MMHG | WEIGHT: 162.3 LBS | RESPIRATION RATE: 16 BRPM | DIASTOLIC BLOOD PRESSURE: 82 MMHG | OXYGEN SATURATION: 98 % | HEART RATE: 70 BPM | BODY MASS INDEX: 31.86 KG/M2 | TEMPERATURE: 98.1 F | HEIGHT: 60 IN

## 2020-07-29 LAB
A/G RATIO: 1.4 (ref 1.1–2.2)
ALBUMIN SERPL-MCNC: 4.7 G/DL (ref 3.4–5)
ALP BLD-CCNC: 61 U/L (ref 40–129)
ALT SERPL-CCNC: 15 U/L (ref 10–40)
ANION GAP SERPL CALCULATED.3IONS-SCNC: 18 MMOL/L (ref 3–16)
AST SERPL-CCNC: 21 U/L (ref 15–37)
BILIRUB SERPL-MCNC: 0.3 MG/DL (ref 0–1)
BILIRUBIN URINE: NEGATIVE
BLOOD, URINE: NEGATIVE
BUN BLDV-MCNC: 13 MG/DL (ref 7–20)
CALCIUM SERPL-MCNC: 9.8 MG/DL (ref 8.3–10.6)
CHLORIDE BLD-SCNC: 102 MMOL/L (ref 99–110)
CHOLESTEROL, TOTAL: 213 MG/DL (ref 0–199)
CLARITY: CLEAR
CO2: 20 MMOL/L (ref 21–32)
COLOR: YELLOW
CREAT SERPL-MCNC: 0.6 MG/DL (ref 0.6–1.1)
GFR AFRICAN AMERICAN: >60
GFR NON-AFRICAN AMERICAN: >60
GLOBULIN: 3.3 G/DL
GLUCOSE BLD-MCNC: 87 MG/DL (ref 70–99)
GLUCOSE URINE: NEGATIVE MG/DL
HDLC SERPL-MCNC: 44 MG/DL (ref 40–60)
KETONES, URINE: NEGATIVE MG/DL
LDL CHOLESTEROL CALCULATED: 141 MG/DL
LEUKOCYTE ESTERASE, URINE: NEGATIVE
MICROSCOPIC EXAMINATION: NORMAL
NITRITE, URINE: NEGATIVE
PH UA: 5.5 (ref 5–8)
POTASSIUM SERPL-SCNC: 4.5 MMOL/L (ref 3.5–5.1)
PROTEIN UA: NEGATIVE MG/DL
SODIUM BLD-SCNC: 140 MMOL/L (ref 136–145)
SPECIFIC GRAVITY UA: 1.02 (ref 1–1.03)
TOTAL PROTEIN: 8 G/DL (ref 6.4–8.2)
TRIGL SERPL-MCNC: 141 MG/DL (ref 0–150)
TSH SERPL DL<=0.05 MIU/L-ACNC: 3.69 UIU/ML (ref 0.27–4.2)
URINE TYPE: NORMAL
UROBILINOGEN, URINE: 0.2 E.U./DL
VLDLC SERPL CALC-MCNC: 28 MG/DL

## 2020-07-29 PROCEDURE — 81003 URINALYSIS AUTO W/O SCOPE: CPT | Performed by: FAMILY MEDICINE

## 2020-07-29 PROCEDURE — 36415 COLL VENOUS BLD VENIPUNCTURE: CPT | Performed by: FAMILY MEDICINE

## 2020-07-29 PROCEDURE — 99214 OFFICE O/P EST MOD 30 MIN: CPT | Performed by: FAMILY MEDICINE

## 2020-07-29 RX ORDER — TRAMADOL HYDROCHLORIDE 50 MG/1
50 TABLET ORAL EVERY 6 HOURS PRN
Qty: 15 TABLET | Refills: 0 | Status: SHIPPED | OUTPATIENT
Start: 2020-07-29 | End: 2020-08-03

## 2020-07-29 ASSESSMENT — ENCOUNTER SYMPTOMS
BELCHING: 0
NAUSEA: 0
BACK PAIN: 1
FLATUS: 0
CONSTIPATION: 0
CHEST TIGHTNESS: 0
SHORTNESS OF BREATH: 0
DIARRHEA: 0
BLOOD IN STOOL: 0
BOWEL INCONTINENCE: 0
COLOR CHANGE: 0
ABDOMINAL PAIN: 1
ABDOMINAL DISTENTION: 0
HEMATOCHEZIA: 0

## 2020-07-29 NOTE — PROGRESS NOTES
down, twisting, standing and sitting. Stiffness is present all day. Associated symptoms include abdominal pain, leg pain, pelvic pain and weakness. Pertinent negatives include no bowel incontinence, chest pain, dysuria, fever, headaches, paresis, paresthesias, tingling or weight loss. Risk factors include lack of exercise and menopause. She has tried nothing for the symptoms. Review of Systems   Constitutional: Positive for activity change and fatigue. Negative for appetite change, fever and weight loss. Respiratory: Negative for chest tightness and shortness of breath. Cardiovascular: Negative for chest pain and palpitations. Gastrointestinal: Positive for abdominal pain. Negative for abdominal distention, blood in stool, bowel incontinence, constipation, diarrhea, flatus, hematochezia, melena and nausea. Genitourinary: Positive for pelvic pain. Negative for dysuria, frequency, hematuria and urgency. Musculoskeletal: Positive for back pain. Negative for gait problem. Skin: Negative for color change and pallor. Neurological: Positive for weakness. Negative for tingling, headaches and paresthesias. Psychiatric/Behavioral: Positive for dysphoric mood. Negative for behavioral problems and decreased concentration. has No Known Allergies. Current Outpatient Medications:     traMADol (ULTRAM) 50 MG tablet, Take 1 tablet by mouth every 6 hours as needed for Pain for up to 5 days. Intended supply: 3 days. Take lowest dose possible to manage pain, Disp: 15 tablet, Rfl: 0    Misc.  Devices (BATH/SHOWER SEAT) MISC, 1 Device by Does not apply route as needed (.), Disp: 1 each, Rfl: 0    cyclobenzaprine (FLEXERIL) 5 MG tablet, TAKE ONE TABLET BY MOUTH EVERY EVENING AS NEEDED FOR MUSCLE SPASMS, Disp: 30 tablet, Rfl: 3    levothyroxine (SYNTHROID) 25 MCG tablet, TAKE ONE TABLET BY MOUTH DAILY, Disp: 90 tablet, Rfl: 2    lidocaine 4 % GEL jelly, Apply on back tid prn for pain, Disp: 60 mL, Rfl: 2    traZODone (DESYREL) 50 MG tablet, Take 1 tablet by mouth nightly, Disp: 30 tablet, Rfl: 5    albuterol sulfate HFA (PROVENTIL HFA) 108 (90 Base) MCG/ACT inhaler, Inhale 2 puffs into the lungs every 6 hours as needed for Wheezing or Shortness of Breath (and/or persistent cough), Disp: 1 Inhaler, Rfl: 5    omeprazole (PRILOSEC) 20 MG delayed release capsule, Take 1 capsule by mouth daily, Disp: 90 capsule, Rfl: 3    acetaminophen (APAP EXTRA STRENGTH) 500 MG tablet, Take 1 tablet by mouth every 6 hours as needed for Pain, Disp: 120 tablet, Rfl: 3    Diapers & Supplies MISC, 1 each by Does not apply route daily, Disp: 100 each, Rfl: 3    Cholecalciferol (VITAMIN D) 2000 units TABS tablet, Take 1 tablet by mouth daily After finishing 12 week couse, Disp: 30 tablet, Rfl: 11    Elastic Bandages & Supports ([employer] LUMBAR/BACK SUPPORT BRACE) MISC, 1 applicator by Does not apply route daily, Disp: 1 each, Rfl: 0    gabapentin (NEURONTIN) 100 MG capsule, Take 1 capsule by mouth 3 times daily for 180 days. , Disp: 270 capsule, Rfl: 1    diclofenac sodium (VOLTAREN) 1 % GEL, Apply 2 grams in affected joints 2-3 times a day. (Patient not taking: Reported on 12/17/2019), Disp: 2 Tube, Rfl: 5    acetaminophen (APAP EXTRA STRENGTH) 500 MG tablet, Tab 2 once a day (Patient not taking: Reported on 12/17/2019), Disp: 120 tablet, Rfl: 3     has a past medical history of Arthritis, Asthma, Chronic bilateral thoracic back pain, GERD (gastroesophageal reflux disease), Intractable chronic migraine without aura and without status migrainosus, Primary osteoarthritis of both hands, and Thyroid disease. Past Surgical History:   Procedure Laterality Date    HEMORRHOIDECTOMY N/A 9/23/2019    HEMORRHOIDECTOMY performed by Luis Sorto MD at [facility]        reports that she has never smoked. She has never used smokeless tobacco. She reports that she does not drink alcohol or use drugs.     Family history is unknown by patient. Objective:  Blood pressure 126/82, pulse 70, temperature 98.1 °F (36.7 °C), temperature source Tympanic, resp. rate 16, height 5' (1.524 m), weight 162 lb 4.8 oz (73.6 kg), SpO2 98 %, not currently breastfeeding. Physical Exam  Vitals signs and nursing note reviewed. Constitutional:       General: She is not in acute distress. Appearance: Normal appearance. She is well-developed. She is obese. HENT:      Head: Normocephalic. Eyes:      Extraocular Movements: Extraocular movements intact. Conjunctiva/sclera: Conjunctivae normal.      Pupils: Pupils are equal, round, and reactive to light. Neck:      Musculoskeletal: Normal range of motion and neck supple. Thyroid: No thyromegaly. Pulmonary:      Effort: Pulmonary effort is normal.      Breath sounds: Normal breath sounds. No wheezing, rhonchi or rales. Chest:      Chest wall: No tenderness. Abdominal:      General: Bowel sounds are normal.      Palpations: Abdomen is soft. Tenderness: There is abdominal tenderness. There is no right CVA tenderness or left CVA tenderness. Musculoskeletal:      Cervical back: Normal.      Thoracic back: Normal.      Lumbar back: She exhibits decreased range of motion, tenderness, pain and spasm. She exhibits no bony tenderness, no swelling, no edema, no deformity and normal pulse. Lymphadenopathy:      Cervical: No cervical adenopathy. Skin:     General: Skin is warm. Findings: No rash. Neurological:      General: No focal deficit present. Mental Status: She is alert and oriented to person, place, and time. Mental status is at baseline. Cranial Nerves: No cranial nerve deficit. Motor: No weakness or abnormal muscle tone. Coordination: Coordination normal.      Gait: Gait normal.      Deep Tendon Reflexes: Reflexes are normal and symmetric.  Reflexes normal.   Psychiatric:         Mood and Affect: Mood normal.         Assessment:       Diagnosis Orders   1. Generalized abdominal pain  Comprehensive Metabolic Panel    Urinalysis   2. Thrombocytopenia ([address]     3. Hypothyroidism, unspecified type  TSH without Reflex   4. Slow transit constipation     5. Screening, lipid  Lipid Panel   6. Vitamin D deficiency  Vitamin D 25 Hydroxy   7. Acute back pain with sciatica, left  Urinalysis    traMADol (ULTRAM) 50 MG tablet    Misc. Devices (BATH/SHOWER SEAT) MISC           Plan:      NOS  Benign exam   Get labs including ua  Possible sec to her baseline IBS  Patient advised to go to Ed or call 911 if sx worsen,  agrees and verbalizes understanding    2. 3. Check fu labs    4. Denies sx now  Continue same medications no changes needed at this time     7. CT in the past showed ddd   Tramadol tid prn   Controlled Substances Monitoring: Attestation: The Prescription Monitoring Report for this patient was reviewed today. Ruslan Smith MD)  Documentation: No signs of potential drug abuse or diversion identified.  Ruslan Smith MD)    Fu 3 mo          Michell Pagan MD

## 2020-07-30 LAB — VITAMIN D 25-HYDROXY: 24.5 NG/ML

## 2020-09-25 RX ORDER — CHOLECALCIFEROL (VITAMIN D3) 50 MCG
2000 TABLET ORAL DAILY
Qty: 30 TABLET | Refills: 11 | Status: SHIPPED | OUTPATIENT
Start: 2020-09-25 | End: 2021-09-17 | Stop reason: SDUPTHER

## 2020-09-25 RX ORDER — OMEPRAZOLE 20 MG/1
20 CAPSULE, DELAYED RELEASE ORAL DAILY
Qty: 90 CAPSULE | Refills: 3 | Status: SHIPPED | OUTPATIENT
Start: 2020-09-25 | End: 2020-12-23 | Stop reason: SDUPTHER

## 2020-09-25 RX ORDER — LEVOTHYROXINE SODIUM 0.03 MG/1
25 TABLET ORAL DAILY
Qty: 90 TABLET | Refills: 2 | Status: SHIPPED | OUTPATIENT
Start: 2020-09-25 | End: 2021-02-25 | Stop reason: ALTCHOICE

## 2020-09-25 RX ORDER — TRAZODONE HYDROCHLORIDE 50 MG/1
50 TABLET ORAL NIGHTLY
Qty: 30 TABLET | Refills: 5 | Status: SHIPPED | OUTPATIENT
Start: 2020-09-25 | End: 2021-05-24 | Stop reason: SDUPTHER

## 2020-09-25 RX ORDER — CYCLOBENZAPRINE HCL 5 MG
5 TABLET ORAL 2 TIMES DAILY PRN
Qty: 30 TABLET | Refills: 1 | Status: SHIPPED | OUTPATIENT
Start: 2020-09-25 | End: 2021-02-12 | Stop reason: SDUPTHER

## 2020-10-27 ENCOUNTER — TELEPHONE (OUTPATIENT)
Dept: FAMILY MEDICINE CLINIC | Age: 60
End: 2020-10-27

## 2020-10-27 NOTE — TELEPHONE ENCOUNTER
----- Message from Salma Rossy sent at 10/27/2020  9:38 AM EDT -----  Subject: Message to Provider    QUESTIONS  Information for Provider? has burning sensation on back   booked appointment for 11/23 but wants to know if can come in sooner  ---------------------------------------------------------------------------  --------------  [address]  What is the best way for the office to contact you? OK to leave message on   voicemail  Preferred Call Back Phone Number? [phone]  ---------------------------------------------------------------------------  --------------  SCRIPT ANSWERS  Relationship to Patient?  Self

## 2020-11-20 RX ORDER — ACETAMINOPHEN 500 MG
500 TABLET ORAL EVERY 6 HOURS PRN
Qty: 120 TABLET | Refills: 3 | Status: SHIPPED | OUTPATIENT
Start: 2020-11-20 | End: 2020-11-30 | Stop reason: SDUPTHER

## 2020-11-30 ENCOUNTER — VIRTUAL VISIT (OUTPATIENT)
Dept: FAMILY MEDICINE CLINIC | Age: 60
End: 2020-11-30
Payer: MEDICAID

## 2020-11-30 PROCEDURE — 99213 OFFICE O/P EST LOW 20 MIN: CPT | Performed by: FAMILY MEDICINE

## 2020-11-30 RX ORDER — ACETAMINOPHEN 500 MG
500 TABLET ORAL EVERY 6 HOURS PRN
Qty: 120 TABLET | Refills: 3 | Status: SHIPPED | OUTPATIENT
Start: 2020-11-30 | End: 2021-09-17 | Stop reason: SDUPTHER

## 2020-11-30 ASSESSMENT — ENCOUNTER SYMPTOMS
SORE THROAT: 0
SWOLLEN GLANDS: 0
VISUAL CHANGE: 0
NAUSEA: 0
ABDOMINAL PAIN: 0
COUGH: 0
VOMITING: 0
CHANGE IN BOWEL HABIT: 0

## 2020-11-30 ASSESSMENT — PATIENT HEALTH QUESTIONNAIRE - PHQ9
SUM OF ALL RESPONSES TO PHQ QUESTIONS 1-9: 0
1. LITTLE INTEREST OR PLEASURE IN DOING THINGS: 0
SUM OF ALL RESPONSES TO PHQ9 QUESTIONS 1 & 2: 0
2. FEELING DOWN, DEPRESSED OR HOPELESS: 0
SUM OF ALL RESPONSES TO PHQ QUESTIONS 1-9: 0
SUM OF ALL RESPONSES TO PHQ QUESTIONS 1-9: 0

## 2020-11-30 NOTE — PROGRESS NOTES
Subjective:      Patient ID: Dunia Saavedra is a 62 y.o. female. History provided by her son and pt, he assist as an  (no  available for her visit)     Other   This is a new (skin warm, ha , leg pain) problem. The current episode started more than 1 month ago. The problem occurs constantly. The problem has been unchanged (usually at night time, but no fever ). Associated symptoms include fatigue, headaches (chronic unchanged) and myalgias (unchanged). Pertinent negatives include no abdominal pain, anorexia, arthralgias, change in bowel habit, chest pain, chills, congestion, coughing, diaphoresis, fever, joint swelling, nausea, neck pain, numbness, rash, sore throat, swollen glands, vertigo, visual change, vomiting or weakness. Nothing aggravates the symptoms. She has tried nothing for the symptoms. Review of Systems   Constitutional: Positive for fatigue. Negative for chills, diaphoresis and fever. HENT: Negative for congestion and sore throat. Respiratory: Negative for cough. Cardiovascular: Negative for chest pain. Gastrointestinal: Negative for abdominal pain, anorexia, change in bowel habit, nausea and vomiting. Musculoskeletal: Positive for myalgias (unchanged). Negative for arthralgias, joint swelling and neck pain. Skin: Negative for rash. Neurological: Positive for headaches (chronic unchanged). Negative for vertigo, weakness and numbness. has No Known Allergies.       Current Outpatient Medications:     acetaminophen (APAP EXTRA STRENGTH) 500 MG tablet, Take 1 tablet by mouth every 6 hours as needed for Pain, Disp: 120 tablet, Rfl: 3    Diapers & Supplies MISC, 1 each by Does not apply route daily, Disp: 100 each, Rfl: 3    vitamin D (CHOLECALCIFEROL) 50 MCG (2000 UT) TABS tablet, Take 1 tablet by mouth daily After finishing 12 week couse, Disp: 30 tablet, Rfl: 11    omeprazole (PRILOSEC) 20 MG delayed release capsule, Take 1 capsule by mouth daily, Disp: for this visit      Physical Exam  Constitutional:       General: She is not in acute distress. HENT:      Head:      Comments: Hearing intact to nml conversation     Neurological:      Mental Status: She is alert and oriented to person, place, and time. Psychiatric:         Mood and Affect: Mood normal.         Assessment:       Diagnosis Orders   1. Heat intolerance  CBC Auto Differential    TSH without Reflex   2. Osteoarthritis, unspecified osteoarthritis type, unspecified site  acetaminophen (APAP EXTRA STRENGTH) 500 MG tablet   3. Muscle spasm of back  acetaminophen (APAP EXTRA STRENGTH) 500 MG tablet   4. Chronic bilateral thoracic back pain  acetaminophen (APAP EXTRA STRENGTH) 500 MG tablet           Plan:      1. Needs labs  I advised her to check her temperature if she feels \"face hot\"  If her labs are normal we may need to resume gabapentin   Fu in 1 mo ov     Refill tylenol     Jinny Blackmon is a 62 y.o. female evaluated via telephone on 11/30/2020. Consent:  She and/or health care decision maker is aware that that she may receive a bill for this telephone service, depending on her insurance coverage, and has provided verbal consent to proceed: Yes      Documentation:  I communicated with the patient and/or health care decision maker about cc. Details of this discussion including any medical advice provided: yes      I affirm this is a Patient Initiated Episode with a Patient who has not had a related appointment within my department in the past 7 days or scheduled within the next 24 hours.     Patient identification was verified at the start of the visit: Yes    Total Time: minutes: 5-10 minutes    Note: not billable if this call serves to triage the patient into an appointment for the relevant concern      Brea Kraus MD

## 2020-12-01 ENCOUNTER — HOSPITAL ENCOUNTER (OUTPATIENT)
Age: 60
Discharge: HOME | End: 2020-12-01
Payer: MEDICAID

## 2020-12-01 LAB
BASOPHILS ABSOLUTE: 0.1 K/UL (ref 0–0.2)
BASOPHILS RELATIVE PERCENT: 1.9 %
EOSINOPHILS ABSOLUTE: 0.2 K/UL (ref 0–0.6)
EOSINOPHILS RELATIVE PERCENT: 3.8 %
HCT VFR BLD CALC: 36.3 % (ref 36–48)
HEMOGLOBIN: 12 G/DL (ref 12–16)
LYMPHOCYTES ABSOLUTE: 1.7 K/UL (ref 1–5.1)
LYMPHOCYTES RELATIVE PERCENT: 36.5 %
MCH RBC QN AUTO: 30.5 PG (ref 26–34)
MCHC RBC AUTO-ENTMCNC: 33 G/DL (ref 31–36)
MCV RBC AUTO: 92.4 FL (ref 80–100)
MONOCYTES ABSOLUTE: 0.4 K/UL (ref 0–1.3)
MONOCYTES RELATIVE PERCENT: 8.5 %
NEUTROPHILS ABSOLUTE: 2.3 K/UL (ref 1.7–7.7)
NEUTROPHILS RELATIVE PERCENT: 49.3 %
PDW BLD-RTO: 14.1 % (ref 12.4–15.4)
PLATELET # BLD: 142 K/UL (ref 135–450)
PMV BLD AUTO: 12.2 FL (ref 5–10.5)
RBC # BLD: 3.92 M/UL (ref 4–5.2)
TSH SERPL DL<=0.05 MIU/L-ACNC: 3.94 UIU/ML (ref 0.27–4.2)
WBC # BLD: 4.7 K/UL (ref 4–11)

## 2020-12-01 PROCEDURE — 36415 COLL VENOUS BLD VENIPUNCTURE: CPT

## 2020-12-01 PROCEDURE — 84443 ASSAY THYROID STIM HORMONE: CPT

## 2020-12-01 PROCEDURE — 85025 COMPLETE CBC W/AUTO DIFF WBC: CPT

## 2020-12-23 RX ORDER — OMEPRAZOLE 20 MG/1
20 CAPSULE, DELAYED RELEASE ORAL DAILY
Qty: 90 CAPSULE | Refills: 3 | Status: SHIPPED | OUTPATIENT
Start: 2020-12-23 | End: 2020-12-28

## 2020-12-28 RX ORDER — OMEPRAZOLE 20 MG/1
CAPSULE, DELAYED RELEASE ORAL
Qty: 90 CAPSULE | Refills: 2 | Status: SHIPPED | OUTPATIENT
Start: 2020-12-28 | End: 2021-02-12 | Stop reason: SDUPTHER

## 2021-02-12 RX ORDER — CYCLOBENZAPRINE HCL 5 MG
5 TABLET ORAL 2 TIMES DAILY PRN
Qty: 30 TABLET | Refills: 1 | Status: SHIPPED | OUTPATIENT
Start: 2021-02-12 | End: 2021-03-05

## 2021-02-12 RX ORDER — OMEPRAZOLE 20 MG/1
CAPSULE, DELAYED RELEASE ORAL
Qty: 90 CAPSULE | Refills: 2 | Status: SHIPPED | OUTPATIENT
Start: 2021-02-12 | End: 2021-05-24 | Stop reason: SDUPTHER

## 2021-02-12 NOTE — TELEPHONE ENCOUNTER
----- Message from Neymar Briceño sent at 1/07/7616  7:51 AM EST -----  Subject: Refill Request    QUESTIONS  Name of Medication? omeprazole (PRILOSEC) 20 MG delayed release capsule  Patient-reported dosage and instructions? TAKE ONE CAPSULE BY MOUTH DAILY  How many days do you have left? 0  Preferred Pharmacy? [facility] [address] phone number (if available)? [phone]  ---------------------------------------------------------------------------  --------------  Koko BRANCH  What is the best way for the office to contact you? OK to leave message on   voicemail  Preferred Call Back Phone Number?  [phone]

## 2021-02-12 NOTE — TELEPHONE ENCOUNTER
----- Message from Susu Polanco sent at 6/17/6763  7:53 AM EST -----  Subject: Refill Request    QUESTIONS  Name of Medication? cyclobenzaprine (FLEXERIL) 5 MG tablet  Patient-reported dosage and instructions? Take 1 tablet by mouth 2 times   daily as needed for Muscle spasms  How many days do you have left? 1  Preferred Pharmacy? [facility] [address]  phone number (if available)? [phone]  ---------------------------------------------------------------------------  --------------  Inés Can INFO  What is the best way for the office to contact you? OK to leave message on   voicemail  Preferred Call Back Phone Number?  [phone]

## 2021-02-24 ENCOUNTER — OFFICE VISIT (OUTPATIENT)
Dept: FAMILY MEDICINE CLINIC | Age: 61
End: 2021-02-24
Payer: MEDICAID

## 2021-02-24 VITALS
HEIGHT: 60 IN | OXYGEN SATURATION: 98 % | TEMPERATURE: 97.6 F | DIASTOLIC BLOOD PRESSURE: 78 MMHG | RESPIRATION RATE: 16 BRPM | HEART RATE: 68 BPM | WEIGHT: 169 LBS | SYSTOLIC BLOOD PRESSURE: 126 MMHG | BODY MASS INDEX: 33.18 KG/M2

## 2021-02-24 DIAGNOSIS — F51.04 CHRONIC INSOMNIA: ICD-10-CM

## 2021-02-24 DIAGNOSIS — E03.9 HYPOTHYROIDISM, UNSPECIFIED TYPE: ICD-10-CM

## 2021-02-24 DIAGNOSIS — R20.2 PARESTHESIA: ICD-10-CM

## 2021-02-24 DIAGNOSIS — M25.561 ACUTE PAIN OF RIGHT KNEE: Primary | ICD-10-CM

## 2021-02-24 DIAGNOSIS — Z13.1 SCREENING FOR DIABETES MELLITUS: ICD-10-CM

## 2021-02-24 LAB
ANION GAP SERPL CALCULATED.3IONS-SCNC: 9 MMOL/L (ref 3–16)
BUN BLDV-MCNC: 13 MG/DL (ref 7–20)
CALCIUM SERPL-MCNC: 9.5 MG/DL (ref 8.3–10.6)
CHLORIDE BLD-SCNC: 103 MMOL/L (ref 99–110)
CO2: 24 MMOL/L (ref 21–32)
CREAT SERPL-MCNC: 0.6 MG/DL (ref 0.6–1.1)
GFR AFRICAN AMERICAN: >60
GFR NON-AFRICAN AMERICAN: >60
GLUCOSE BLD-MCNC: 98 MG/DL (ref 70–99)
POTASSIUM SERPL-SCNC: 4.2 MMOL/L (ref 3.5–5.1)
SODIUM BLD-SCNC: 136 MMOL/L (ref 136–145)
TSH SERPL DL<=0.05 MIU/L-ACNC: 4.62 UIU/ML (ref 0.27–4.2)
VITAMIN B-12: 351 PG/ML (ref 211–911)

## 2021-02-24 PROCEDURE — 36415 COLL VENOUS BLD VENIPUNCTURE: CPT | Performed by: FAMILY MEDICINE

## 2021-02-24 PROCEDURE — 99214 OFFICE O/P EST MOD 30 MIN: CPT | Performed by: FAMILY MEDICINE

## 2021-02-24 RX ORDER — GABAPENTIN 100 MG/1
100 CAPSULE ORAL NIGHTLY
Qty: 30 CAPSULE | Refills: 2 | Status: SHIPPED | OUTPATIENT
Start: 2021-02-24 | End: 2021-03-09 | Stop reason: SDUPTHER

## 2021-02-24 RX ORDER — PREDNISONE 20 MG/1
20 TABLET ORAL 2 TIMES DAILY
Qty: 14 TABLET | Refills: 0 | Status: SHIPPED | OUTPATIENT
Start: 2021-02-24 | End: 2021-03-03

## 2021-02-24 ASSESSMENT — ENCOUNTER SYMPTOMS
NAUSEA: 0
SORE THROAT: 0
ABDOMINAL PAIN: 1
SWOLLEN GLANDS: 0
VISUAL CHANGE: 0
VOMITING: 0
COUGH: 0
CHANGE IN BOWEL HABIT: 0

## 2021-02-24 ASSESSMENT — PATIENT HEALTH QUESTIONNAIRE - PHQ9
SUM OF ALL RESPONSES TO PHQ QUESTIONS 1-9: 0

## 2021-02-24 NOTE — PROGRESS NOTES
Subjective:      Patient ID: James Madera is a 62 y.o. female. Here with her son, denied need for    History provided by patient. Knee Pain   There was no injury mechanism (onset: 10 days, no hx of trauma). Pain location: R knee  Quality: sharp  The pain is severe. The pain has been constant since onset. Associated symptoms include tingling. Pertinent negatives include no inability to bear weight, loss of motion, loss of sensation, muscle weakness or numbness. She reports no foreign bodies present. The symptoms are aggravated by movement, palpation and weight bearing. Other  This is a chronic (Burning R  arm, leg ) problem. The current episode started more than 1 year ago. The problem occurs intermittently. The problem has been waxing and waning. Associated symptoms include abdominal pain, arthralgias, fatigue, myalgias, neck pain and weakness. Pertinent negatives include no anorexia, change in bowel habit, chest pain, chills, congestion, coughing, diaphoresis, fever, headaches, joint swelling, nausea, numbness, rash, sore throat, swollen glands, urinary symptoms, vertigo, visual change or vomiting. Nothing aggravates the symptoms. Treatments tried: otc cream  The treatment provided moderate relief. Insomnia  Unsure if taking trazodone    Hypothyroidism:  Now on replacement therapy, denies any fatigue, slow thought process, tremor, hair loss, diaphoresis, heat/cold intolerance, wt gain/loss, diarrhea/constipation. Review of Systems   Constitutional: Positive for fatigue. Negative for chills, diaphoresis and fever. HENT: Negative for congestion and sore throat. Respiratory: Negative for cough. Cardiovascular: Negative for chest pain. Gastrointestinal: Positive for abdominal pain. Negative for anorexia, change in bowel habit, nausea and vomiting. Musculoskeletal: Positive for arthralgias, myalgias and neck pain. Negative for joint swelling. Skin: Negative for rash. Neurological: Positive for tingling and weakness. Negative for vertigo, numbness and headaches. has No Known Allergies. Current Outpatient Medications:     Misc. Devices (BATH/SHOWER SEAT) MISC, 1 Device by Does not apply route as needed (.), Disp: 1 each, Rfl: 0    gabapentin (NEURONTIN) 100 MG capsule, Take 1 capsule by mouth nightly for 90 days. , Disp: 30 capsule, Rfl: 2    predniSONE (DELTASONE) 20 MG tablet, Take 1 tablet by mouth 2 times daily for 7 days, Disp: 14 tablet, Rfl: 0    omeprazole (PRILOSEC) 20 MG delayed release capsule, TAKE ONE CAPSULE BY MOUTH DAILY, Disp: 90 capsule, Rfl: 2    cyclobenzaprine (FLEXERIL) 5 MG tablet, Take 1 tablet by mouth 2 times daily as needed for Muscle spasms, Disp: 30 tablet, Rfl: 1    acetaminophen (APAP EXTRA STRENGTH) 500 MG tablet, Take 1 tablet by mouth every 6 hours as needed for Pain, Disp: 120 tablet, Rfl: 3    Diapers & Supplies MISC, 1 each by Does not apply route daily, Disp: 100 each, Rfl: 3    vitamin D (CHOLECALCIFEROL) 50 MCG (2000 UT) TABS tablet, Take 1 tablet by mouth daily After finishing 12 week couse, Disp: 30 tablet, Rfl: 11    traZODone (DESYREL) 50 MG tablet, Take 1 tablet by mouth nightly, Disp: 30 tablet, Rfl: 5    levothyroxine (SYNTHROID) 25 MCG tablet, Take 1 tablet by mouth Daily, Disp: 90 tablet, Rfl: 2    lidocaine 4 % GEL jelly, Apply on back tid prn for pain, Disp: 60 mL, Rfl: 2    albuterol sulfate HFA (PROVENTIL HFA) 108 (90 Base) MCG/ACT inhaler, Inhale 2 puffs into the lungs every 6 hours as needed for Wheezing or Shortness of Breath (and/or persistent cough), Disp: 1 Inhaler, Rfl: 5    Elastic Bandages & Supports ([employer] LUMBAR/BACK SUPPORT BRACE) MISC, 1 applicator by Does not apply route daily, Disp: 1 each, Rfl: 0    gabapentin (NEURONTIN) 100 MG capsule, Take 1 capsule by mouth 3 times daily for 180 days. , Disp: 270 capsule, Rfl: 1     has a past medical history of Arthritis, Asthma, Chronic bilateral thoracic back pain, GERD (gastroesophageal reflux disease), Intractable chronic migraine without aura and without status migrainosus, Primary osteoarthritis of both hands, and Thyroid disease. Past Surgical History:   Procedure Laterality Date    HEMORRHOIDECTOMY N/A 9/23/2019    HEMORRHOIDECTOMY performed by [facility]adya [facility] at [facility]        reports that she has never smoked. She has never used smokeless tobacco. She reports that she does not drink alcohol or use drugs. Family history is unknown by patient. Objective:  Blood pressure 126/78, pulse 68, temperature 97.6 °F (36.4 °C), temperature source Tympanic, resp. rate 16, height 5' (1.524 m), weight 169 lb (76.7 kg), SpO2 98 %, not currently breastfeeding. Physical Exam  Vitals signs and nursing note reviewed. Constitutional:       General: She is not in acute distress. Appearance: She is well-developed and normal weight. She is not ill-appearing, toxic-appearing or diaphoretic. HENT:      Head: Normocephalic. Ears:      Comments: Hearing intact to nml conversation     Eyes:      General: No scleral icterus. Extraocular Movements: Extraocular movements intact. Conjunctiva/sclera: Conjunctivae normal.      Pupils: Pupils are equal, round, and reactive to light. Neck:      Musculoskeletal: Normal range of motion and neck supple. Thyroid: No thyromegaly. Vascular: No carotid bruit or JVD. Comments: No carotid bruits  Cardiovascular:      Rate and Rhythm: Normal rate and regular rhythm. Pulses:           Carotid pulses are 2+ on the right side and 2+ on the left side. Heart sounds: Normal heart sounds. No murmur. No friction rub. No gallop. Comments: No edema    Pulmonary:      Effort: Pulmonary effort is normal. No respiratory distress. Breath sounds: Normal breath sounds. No stridor. No wheezing, rhonchi or rales. Chest:      Chest wall: No tenderness.    Abdominal: advised as this med can cause sedation and increase risk for falls  patient agrees and verbalizes understanding    Controlled Substances Monitoring: Attestation: The Prescription Monitoring Report for this patient was reviewed today. Colette Montana MD)  Documentation: No signs of potential drug abuse or diversion identified. Colette Montana MD)      3.  Check fu tsh  Adjust med if needed    Barbara Elias MD

## 2021-02-25 ENCOUNTER — TELEPHONE (OUTPATIENT)
Dept: FAMILY MEDICINE CLINIC | Age: 61
End: 2021-02-25

## 2021-02-25 DIAGNOSIS — M19.041 OSTEOARTHRITIS OF BOTH HANDS, UNSPECIFIED OSTEOARTHRITIS TYPE: ICD-10-CM

## 2021-02-25 DIAGNOSIS — M19.042 OSTEOARTHRITIS OF BOTH HANDS, UNSPECIFIED OSTEOARTHRITIS TYPE: ICD-10-CM

## 2021-02-25 DIAGNOSIS — E03.9 HYPOTHYROIDISM, UNSPECIFIED TYPE: Primary | ICD-10-CM

## 2021-02-25 LAB
ESTIMATED AVERAGE GLUCOSE: 122.6 MG/DL
HBA1C MFR BLD: 5.9 %

## 2021-02-25 RX ORDER — LEVOTHYROXINE SODIUM 0.05 MG/1
50 TABLET ORAL DAILY
Qty: 30 TABLET | Refills: 5 | Status: SHIPPED | OUTPATIENT
Start: 2021-02-25 | End: 2021-05-24 | Stop reason: SDUPTHER

## 2021-03-05 RX ORDER — CYCLOBENZAPRINE HCL 5 MG
TABLET ORAL
Qty: 30 TABLET | Refills: 3 | Status: SHIPPED | OUTPATIENT
Start: 2021-03-05 | End: 2021-08-02

## 2021-03-08 ENCOUNTER — PATIENT MESSAGE (OUTPATIENT)
Dept: FAMILY MEDICINE CLINIC | Age: 61
End: 2021-03-08

## 2021-03-08 DIAGNOSIS — R20.2 PARESTHESIA: ICD-10-CM

## 2021-03-08 DIAGNOSIS — E53.9 BURNING FEET SYNDROME: ICD-10-CM

## 2021-03-09 RX ORDER — GABAPENTIN 100 MG/1
300 CAPSULE ORAL NIGHTLY
Qty: 90 CAPSULE | Refills: 1 | Status: SHIPPED
Start: 2021-03-09 | End: 2021-05-24 | Stop reason: CLARIF

## 2021-03-09 NOTE — TELEPHONE ENCOUNTER
All her testing has been negative an no skin changes  She probably has neuropathy    Increase gabapentin to 300 mg hs (now she takes 100)

## 2021-03-10 ENCOUNTER — TELEPHONE (OUTPATIENT)
Dept: FAMILY MEDICINE CLINIC | Age: 61
End: 2021-03-10

## 2021-03-10 RX ORDER — GABAPENTIN 100 MG/1
300 CAPSULE ORAL NIGHTLY
Qty: 270 CAPSULE | Refills: 1 | Status: SHIPPED | OUTPATIENT
Start: 2021-03-10 | End: 2021-05-24 | Stop reason: SDUPTHER

## 2021-04-05 RX ORDER — LEVOTHYROXINE SODIUM 0.03 MG/1
TABLET ORAL
Qty: 90 TABLET | Refills: 3 | Status: SHIPPED | OUTPATIENT
Start: 2021-04-05 | End: 2021-04-26

## 2021-04-26 RX ORDER — LEVOTHYROXINE SODIUM 0.03 MG/1
TABLET ORAL
Qty: 90 TABLET | Refills: 0 | Status: SHIPPED
Start: 2021-04-26 | End: 2021-07-20 | Stop reason: CLARIF

## 2021-05-24 ENCOUNTER — OFFICE VISIT (OUTPATIENT)
Dept: FAMILY MEDICINE CLINIC | Age: 61
End: 2021-05-24
Payer: MEDICAID

## 2021-05-24 VITALS
WEIGHT: 170.3 LBS | SYSTOLIC BLOOD PRESSURE: 120 MMHG | OXYGEN SATURATION: 98 % | BODY MASS INDEX: 33.26 KG/M2 | TEMPERATURE: 97.6 F | DIASTOLIC BLOOD PRESSURE: 74 MMHG | RESPIRATION RATE: 16 BRPM | HEART RATE: 57 BPM

## 2021-05-24 DIAGNOSIS — E53.9 BURNING FEET SYNDROME: ICD-10-CM

## 2021-05-24 DIAGNOSIS — M19.90 OSTEOARTHRITIS, UNSPECIFIED OSTEOARTHRITIS TYPE, UNSPECIFIED SITE: ICD-10-CM

## 2021-05-24 DIAGNOSIS — K21.9 GASTROESOPHAGEAL REFLUX DISEASE WITHOUT ESOPHAGITIS: ICD-10-CM

## 2021-05-24 DIAGNOSIS — E03.9 HYPOTHYROIDISM, UNSPECIFIED TYPE: ICD-10-CM

## 2021-05-24 DIAGNOSIS — M17.11 PRIMARY OSTEOARTHRITIS OF RIGHT KNEE: ICD-10-CM

## 2021-05-24 DIAGNOSIS — F51.04 CHRONIC INSOMNIA: ICD-10-CM

## 2021-05-24 DIAGNOSIS — M54.6 CHRONIC BILATERAL THORACIC BACK PAIN: ICD-10-CM

## 2021-05-24 DIAGNOSIS — J45.20 MILD INTERMITTENT ASTHMA WITHOUT COMPLICATION: Chronic | ICD-10-CM

## 2021-05-24 DIAGNOSIS — G89.29 CHRONIC BILATERAL THORACIC BACK PAIN: ICD-10-CM

## 2021-05-24 DIAGNOSIS — E03.9 HYPOTHYROIDISM, UNSPECIFIED TYPE: Primary | ICD-10-CM

## 2021-05-24 LAB — TSH SERPL DL<=0.05 MIU/L-ACNC: 1.96 UIU/ML (ref 0.27–4.2)

## 2021-05-24 PROCEDURE — 36415 COLL VENOUS BLD VENIPUNCTURE: CPT | Performed by: FAMILY MEDICINE

## 2021-05-24 PROCEDURE — 99214 OFFICE O/P EST MOD 30 MIN: CPT | Performed by: FAMILY MEDICINE

## 2021-05-24 RX ORDER — TRAMADOL HYDROCHLORIDE 50 MG/1
50 TABLET ORAL EVERY 6 HOURS PRN
Qty: 15 TABLET | Refills: 0 | Status: SHIPPED | OUTPATIENT
Start: 2021-05-24 | End: 2021-05-29

## 2021-05-24 RX ORDER — TRAZODONE HYDROCHLORIDE 50 MG/1
50 TABLET ORAL NIGHTLY
Qty: 30 TABLET | Refills: 5 | Status: SHIPPED | OUTPATIENT
Start: 2021-05-24 | End: 2021-08-02

## 2021-05-24 RX ORDER — LEVOTHYROXINE SODIUM 0.05 MG/1
50 TABLET ORAL DAILY
Qty: 30 TABLET | Refills: 5 | Status: SHIPPED | OUTPATIENT
Start: 2021-05-24 | End: 2021-07-20 | Stop reason: SDUPTHER

## 2021-05-24 RX ORDER — GABAPENTIN 100 MG/1
300 CAPSULE ORAL NIGHTLY
Qty: 270 CAPSULE | Refills: 1 | Status: SHIPPED | OUTPATIENT
Start: 2021-05-24 | End: 2021-09-17 | Stop reason: SDUPTHER

## 2021-05-24 RX ORDER — OMEPRAZOLE 20 MG/1
CAPSULE, DELAYED RELEASE ORAL
Qty: 90 CAPSULE | Refills: 2 | Status: SHIPPED | OUTPATIENT
Start: 2021-05-24 | End: 2021-07-20 | Stop reason: SDUPTHER

## 2021-05-24 RX ORDER — ALBUTEROL SULFATE 90 UG/1
2 AEROSOL, METERED RESPIRATORY (INHALATION) EVERY 6 HOURS PRN
Qty: 1 INHALER | Refills: 5 | Status: SHIPPED | OUTPATIENT
Start: 2021-05-24 | End: 2021-09-17 | Stop reason: SDUPTHER

## 2021-05-24 ASSESSMENT — PATIENT HEALTH QUESTIONNAIRE - PHQ9
SUM OF ALL RESPONSES TO PHQ QUESTIONS 1-9: 0
2. FEELING DOWN, DEPRESSED OR HOPELESS: 0
SUM OF ALL RESPONSES TO PHQ QUESTIONS 1-9: 0
1. LITTLE INTEREST OR PLEASURE IN DOING THINGS: 0

## 2021-05-24 NOTE — PROGRESS NOTES
Subjective:      Patient ID: Inocencio Herron is a 62 y.o. female. HPI     Hx provided by patient with [facility]  assisting      Diagnosis Orders   1. Hypothyroidism, unspecified type   Hypothyroidism:  Now on replacement therapy, denies, slow thought process, tremor, hair loss, diaphoresis, heat/cold intolerance, wt gain/loss, diarrhea/constipation. Needs refills          2. Chronic bilateral thoracic back pain   Sx are unchanged, severe pain, mid and upper back,   Aches, no radiation, + generalized weakness          3. Osteoarthritis, unspecified osteoarthritis type, unspecified site   Worse on R neck/shoulder/knee   Was recommended knee surgery in past but she declined. Sx are severe, with multiple visits to ED (work up neg see med record and results)  Takes tylenol/ibu /wo relief of sx       4. Chronic insomnia   Sx unchanged  Needs refills      5. Burning feet syndrome             6. Mild intermittent asthma without complication   Rare exacerbation  Needs refills  No cough/wheezing/sob       7. Gastroesophageal reflux disease without esophagitis  Had egd in past   Needs refills                Review of Systems  Above  has No Known Allergies. Current Outpatient Medications:     levothyroxine (SYNTHROID) 50 MCG tablet, Take 1 tablet by mouth daily, Disp: 30 tablet, Rfl: 5    traZODone (DESYREL) 50 MG tablet, Take 1 tablet by mouth nightly, Disp: 30 tablet, Rfl: 5    gabapentin (NEURONTIN) 100 MG capsule, Take 3 capsules by mouth nightly for 180 days. , Disp: 270 capsule, Rfl: 1    albuterol sulfate HFA (PROVENTIL HFA) 108 (90 Base) MCG/ACT inhaler, Inhale 2 puffs into the lungs every 6 hours as needed for Wheezing or Shortness of Breath (and/or persistent cough), Disp: 1 Inhaler, Rfl: 5    omeprazole (PRILOSEC) 20 MG delayed release capsule, TAKE ONE CAPSULE BY MOUTH DAILY, Disp: 90 capsule, Rfl: 2    Misc.  Devices (ADJUST BATH/SHOWER SEAT) MISC, Use prn, Disp: 1 each, Rfl: 0    traMADol (ULTRAM) 50 MG tablet, Take 1 tablet by mouth every 6 hours as needed for Pain for up to 5 days. Intended supply: 3 days. Take lowest dose possible to manage pain, Disp: 15 tablet, Rfl: 0    Lidocaine 4 % GEL, Apply on  R shoulder and neck tid prn for pain and spasm, Disp: 60 g, Rfl: 2    levothyroxine (SYNTHROID) 25 MCG tablet, TAKE ONE TABLET BY MOUTH DAILY, Disp: 90 tablet, Rfl: 0    cyclobenzaprine (FLEXERIL) 5 MG tablet, TAKE ONE TABLET BY MOUTH TWICE A DAY AS NEEDED FOR MUSCLE SPASMS, Disp: 30 tablet, Rfl: 3    Misc. Devices (BATH/SHOWER SEAT) MISC, 1 Device by Does not apply route as needed (.), Disp: 1 each, Rfl: 0    acetaminophen (APAP EXTRA STRENGTH) 500 MG tablet, Take 1 tablet by mouth every 6 hours as needed for Pain, Disp: 120 tablet, Rfl: 3    Diapers & Supplies MISC, 1 each by Does not apply route daily, Disp: 100 each, Rfl: 3    vitamin D (CHOLECALCIFEROL) 50 MCG (2000 UT) TABS tablet, Take 1 tablet by mouth daily After finishing 12 week couse, Disp: 30 tablet, Rfl: 11    lidocaine 4 % GEL jelly, Apply on back tid prn for pain, Disp: 60 mL, Rfl: 2    Elastic Bandages & Supports ([employer] LUMBAR/BACK SUPPORT BRACE) MISC, 1 applicator by Does not apply route daily, Disp: 1 each, Rfl: 0     has a past medical history of Arthritis, Asthma, Chronic bilateral thoracic back pain, GERD (gastroesophageal reflux disease), Intractable chronic migraine without aura and without status migrainosus, Primary osteoarthritis of both hands, and Thyroid disease. Past Surgical History:   Procedure Laterality Date    HEMORRHOIDECTOMY N/A 9/23/2019    HEMORRHOIDECTOMY performed by Mina Prader, MD at [address] [place] [address]        reports that she has never smoked. She has never used smokeless tobacco. She reports that she does not drink alcohol and does not use drugs. Family history is unknown by patient.     Objective:  Blood pressure 120/74, pulse 57, temperature 97.6 °F (36.4 °C), temperature source Tympanic, resp. rate 16, weight 170 lb 4.8 oz (77.2 kg), SpO2 98 %, not currently breastfeeding. Physical Exam  Vitals and nursing note reviewed. Constitutional:       General: She is not in acute distress. Appearance: She is well-developed. HENT:      Head: Normocephalic. Eyes:      General: No scleral icterus. Conjunctiva/sclera: Conjunctivae normal.      Pupils: Pupils are equal, round, and reactive to light. Neck:      Thyroid: No thyromegaly. Vascular: No JVD. Comments: No carotid bruits  Cardiovascular:      Rate and Rhythm: Normal rate and regular rhythm. Pulses:           Carotid pulses are 2+ on the right side and 2+ on the left side. Heart sounds: Normal heart sounds. No murmur heard. No friction rub. No gallop. Comments: No edema    Pulmonary:      Effort: Pulmonary effort is normal. No respiratory distress. Breath sounds: No wheezing or rales. Chest:      Chest wall: No tenderness. Abdominal:      General: Bowel sounds are normal.      Palpations: Abdomen is soft. There is no mass. Tenderness: There is no abdominal tenderness. Musculoskeletal:      Right shoulder: No swelling. Left shoulder: Tenderness present. Normal strength. Right upper arm: No swelling. Left upper arm: Tenderness present. No edema. Cervical back: Normal range of motion and neck supple. Spasms and tenderness present. No swelling or bony tenderness. Thoracic back: Spasms and tenderness present. No swelling. Back:       Right knee: Swelling present. No effusion or erythema. Tenderness present over the patellar tendon. Lymphadenopathy:      Cervical: No cervical adenopathy. Skin:     General: Skin is warm. Neurological:      Mental Status: She is alert and oriented to person, place, and time. Cranial Nerves: No cranial nerve deficit. Motor: No abnormal muscle tone. Deep Tendon Reflexes: Reflexes are normal and symmetric. Assessment:    blood work, ekg and reports from ED independently reviewed for this apt      Diagnosis Orders   1. Hypothyroidism, unspecified type  levothyroxine (SYNTHROID) 50 MCG tablet    TSH without Reflex   2. Chronic bilateral thoracic back pain  Misc. Devices (ADJUST BATH/SHOWER SEAT) MISC    traMADol (ULTRAM) 50 MG tablet   3. Osteoarthritis, unspecified osteoarthritis type, unspecified site  traMADol (ULTRAM) 50 MG tablet   4. Chronic insomnia  traZODone (DESYREL) 50 MG tablet   5. Burning feet syndrome  gabapentin (NEURONTIN) 100 MG capsule    Misc. Devices (ADJUST BATH/SHOWER SEAT) MISC    traMADol (ULTRAM) 50 MG tablet   6. Mild intermittent asthma without complication  albuterol sulfate HFA (PROVENTIL HFA) 108 (90 Base) MCG/ACT inhaler   7. Gastroesophageal reflux disease without esophagitis  omeprazole (PRILOSEC) 20 MG delayed release capsule   8. Primary osteoarthritis of right knee  DARIUS Alvarenga MD, Orthopedic Surgery, [facility]           Plan:      1. tsh high   Check fu lab , adjust dose if needed    2. ddd   rx for bath seat    3. 8  Trial with topical lidocaine gel   Tramadol prn   patient has been instructed caution with driving or handling of heavy machinery while taking  this medication as it  can cause drowsiness,  patient agrees and verbalizes understanding   Referral to ortho for eval of R knee oa     4. Refills    5. Continue gabapenin  Controlled Substances Monitoring: Attestation: The Prescription Monitoring Report for this patient was reviewed today. Luana Figueredo MD)  Documentation: No signs of potential drug abuse or diversion identified. Luana Figueredo MD)      7.  Continue ppi       Fu 6 mo and prn           Shirl Rinne, MD

## 2021-07-12 ENCOUNTER — TELEPHONE (OUTPATIENT)
Dept: FAMILY MEDICINE CLINIC | Age: 61
End: 2021-07-12

## 2021-07-20 ENCOUNTER — TELEMEDICINE (OUTPATIENT)
Dept: FAMILY MEDICINE CLINIC | Age: 61
End: 2021-07-20
Payer: MEDICAID

## 2021-07-20 DIAGNOSIS — M54.50 CHRONIC BILATERAL LOW BACK PAIN WITHOUT SCIATICA: Primary | ICD-10-CM

## 2021-07-20 DIAGNOSIS — E03.9 HYPOTHYROIDISM, UNSPECIFIED TYPE: ICD-10-CM

## 2021-07-20 DIAGNOSIS — K21.9 GASTROESOPHAGEAL REFLUX DISEASE WITHOUT ESOPHAGITIS: ICD-10-CM

## 2021-07-20 DIAGNOSIS — F51.04 CHRONIC INSOMNIA: ICD-10-CM

## 2021-07-20 DIAGNOSIS — G89.29 CHRONIC BILATERAL LOW BACK PAIN WITHOUT SCIATICA: Primary | ICD-10-CM

## 2021-07-20 DIAGNOSIS — J45.20 MILD INTERMITTENT ASTHMA WITHOUT COMPLICATION: ICD-10-CM

## 2021-07-20 DIAGNOSIS — Z02.9 ADMINISTRATIVE ENCOUNTER: ICD-10-CM

## 2021-07-20 PROCEDURE — 99213 OFFICE O/P EST LOW 20 MIN: CPT | Performed by: FAMILY MEDICINE

## 2021-07-20 RX ORDER — LEVOTHYROXINE SODIUM 0.05 MG/1
50 TABLET ORAL DAILY
Qty: 90 TABLET | Refills: 5 | Status: SHIPPED | OUTPATIENT
Start: 2021-07-20 | End: 2021-09-17 | Stop reason: SDUPTHER

## 2021-07-20 RX ORDER — OMEPRAZOLE 20 MG/1
CAPSULE, DELAYED RELEASE ORAL
Qty: 90 CAPSULE | Refills: 2 | Status: SHIPPED | OUTPATIENT
Start: 2021-07-20 | End: 2021-09-17 | Stop reason: SDUPTHER

## 2021-07-20 ASSESSMENT — PATIENT HEALTH QUESTIONNAIRE - PHQ9
SUM OF ALL RESPONSES TO PHQ QUESTIONS 1-9: 0
SUM OF ALL RESPONSES TO PHQ QUESTIONS 1-9: 0
2. FEELING DOWN, DEPRESSED OR HOPELESS: 0
SUM OF ALL RESPONSES TO PHQ9 QUESTIONS 1 & 2: 0
SUM OF ALL RESPONSES TO PHQ QUESTIONS 1-9: 0
1. LITTLE INTEREST OR PLEASURE IN DOING THINGS: 0

## 2021-07-21 PROBLEM — M54.50 CHRONIC BILATERAL LOW BACK PAIN WITHOUT SCIATICA: Status: ACTIVE | Noted: 2021-07-21

## 2021-07-21 PROBLEM — E03.9 HYPOTHYROIDISM: Status: ACTIVE | Noted: 2021-07-21

## 2021-07-21 PROBLEM — F51.04 CHRONIC INSOMNIA: Status: ACTIVE | Noted: 2021-07-21

## 2021-07-21 PROBLEM — G89.29 CHRONIC BILATERAL LOW BACK PAIN WITHOUT SCIATICA: Status: ACTIVE | Noted: 2021-07-21

## 2021-07-21 ASSESSMENT — ENCOUNTER SYMPTOMS
ABDOMINAL PAIN: 0
SPUTUM PRODUCTION: 0
BACK PAIN: 1
COUGH: 0
FREQUENT THROAT CLEARING: 1
WHEEZING: 0
HEMOPTYSIS: 0
HEARTBURN: 1
DIFFICULTY BREATHING: 1
VOMITING: 0
CHEST TIGHTNESS: 0
SHORTNESS OF BREATH: 0
HOARSE VOICE: 0

## 2021-07-21 NOTE — PROGRESS NOTES
Subjective:    Deanna Hilton is a 62 y.o. female evaluated via telephone on 7/20/2021. Consent:  She and/or health care decision maker is aware that that she may receive a bill for this telephone service, depending on her insurance coverage, and has provided verbal consent to proceed: Yes      Documentation:  I communicated with the patient and/or health care decision maker about cc. Details of this discussion including any medical advice provided: yes      I affirm this is a Patient Initiated Episode with a Patient who has not had a related appointment within my department in the past 7 days or scheduled within the next 24 hours. Patient identification was verified at the start of the visit: Yes    Total Time: minutes: 15 minutes    The visit was conducted pursuant to the emergency declaration under the [address] [place] [address], [address] [place] [address] authority and the [employer] Act. Patient identification was verified, and a caregiver was present when appropriate. The patient was located in a state where the provider was credentialed to provide care. Note: not billable if this call serves to triage the patient into an appointment for the relevant concern      Amber Gutireres MD       Patient ID: Deanna Hilton is a 62 y.o. female. Back Pain  This is a chronic problem. The current episode started more than 1 year ago. The problem occurs intermittently. The problem has been waxing and waning since onset. The pain is present in the lumbar spine. The quality of the pain is described as aching. The pain does not radiate. The pain is severe. The pain is the same all the time. The symptoms are aggravated by position, sitting, twisting and bending. Stiffness is present all day. Associated symptoms include headaches. Pertinent negatives include no abdominal pain, chest pain or weakness.  Risk factors include menopause, sedentary lifestyle and poor posture. Treatments tried: gabapentin. The treatment provided moderate relief. Gastroesophageal Reflux  She complains of heartburn. She reports no abdominal pain, no chest pain, no coughing, no hoarse voice or no wheezing. This is a chronic problem. The heartburn does not wake her from sleep. The heartburn does not limit her activity. The heartburn doesn't change with position. Associated symptoms include fatigue. She has tried a PPI for the symptoms. The treatment provided significant relief. Other  This is a chronic (hypothyroid) problem. The current episode started more than 1 year ago. Associated symptoms include arthralgias, fatigue and headaches. Pertinent negatives include no abdominal pain, anorexia, chest pain, coughing, diaphoresis, vomiting or weakness. Nothing aggravates the symptoms. Treatments tried: synthroid. The treatment provided moderate relief. Asthma  She complains of difficulty breathing and frequent throat clearing. There is no chest tightness, cough, hemoptysis, hoarse voice, shortness of breath, sputum production or wheezing. This is a chronic problem. The current episode started more than 1 year ago. The problem occurs intermittently. The problem has been waxing and waning. Associated symptoms include headaches and heartburn. Pertinent negatives include no chest pain. Her symptoms are aggravated by nothing. Her symptoms are alleviated by beta-agonist. She reports significant improvement on treatment. Her past medical history is significant for asthma. Insomnia   Symptoms are stable with current treatment (trazodone) no secondary effects      Review of Systems   Constitutional: Positive for fatigue. Negative for diaphoresis. HENT: Negative for hoarse voice. Respiratory: Negative for cough, hemoptysis, sputum production, shortness of breath and wheezing. Cardiovascular: Negative for chest pain. Gastrointestinal: Positive for heartburn.  Negative for abdominal pain, drugs. Family history is unknown by patient. Objective:   Physical Exam  Phone visit     FMLA doc filled   Assessment:       Diagnosis Orders   1. Chronic bilateral low back pain without sciatica  Lidocaine 4 % GEL   2. Gastroesophageal reflux disease without esophagitis  omeprazole (PRILOSEC) 20 MG delayed release capsule   3. Hypothyroidism, unspecified type  levothyroxine (SYNTHROID) 50 MCG tablet   4. Mild intermittent asthma without complication     5. Chronic insomnia             Plan:      1. Continue current treatment, add lidocaine 4% gel to has provided some relief  Continue to monitor symptoms  Follow-up in 3 months    2. Refill Prilosec,    3. Last TSH at goal, refill Synthroid    4. Asymptomatic, continue SAB as needed    5.   Continue trazodone,    FMLA filled    Follow-up in 3 months        Rosa Isela Gee MD

## 2021-08-04 RX ORDER — CYCLOBENZAPRINE HCL 5 MG
5 TABLET ORAL 2 TIMES DAILY PRN
Qty: 60 TABLET | Refills: 3 | Status: SHIPPED | OUTPATIENT
Start: 2021-08-04 | End: 2021-09-17 | Stop reason: SDUPTHER

## 2021-08-04 NOTE — TELEPHONE ENCOUNTER
Medication:   Requested Prescriptions     Pending Prescriptions Disp Refills    cyclobenzaprine (FLEXERIL) 5 MG tablet 30 tablet 3            Patient Phone Number: [phone] (home) [phone] (work)    Last appt: 7/20/2021

## 2021-08-30 ENCOUNTER — TELEPHONE (OUTPATIENT)
Dept: FAMILY MEDICINE CLINIC | Age: 61
End: 2021-08-30

## 2021-09-09 ENCOUNTER — TELEPHONE (OUTPATIENT)
Dept: FAMILY MEDICINE CLINIC | Age: 61
End: 2021-09-09

## 2021-09-09 NOTE — TELEPHONE ENCOUNTER
Last OV:  7/20/2021    ----- Message from Marine Luciano sent at 9/8/2021  3:28 PM EDT -----  Subject: Appointment Request    Reason for Call: Urgent Joint Pain    QUESTIONS  Type of Appointment? Established Patient  Reason for appointment request? No appointments available during search  Additional Information for Provider? Son Shital Pepe called to schedule an fadi   for pt. She has been complaining of joint pain, no swelling or fever. There was no recent appointments. Please call son with an fadi asap  ---------------------------------------------------------------------------  --------------  CALL BACK INFO  What is the best way for the office to contact you? OK to leave message on   voicemail  Preferred Call Back Phone Number? [phone]  ---------------------------------------------------------------------------  --------------  SCRIPT ANSWERS  Relationship to Patient? Other  Representative Name? Gucci cox  Additional information verified (besides Name and Date of Birth)? Phone   Number  Did you have an injury or trauma within the past 3 days? No  Is your joint red or swollen? No  Are you having new onset numbness, tingling, or weakness with the pain? No  Did you have an injury or trauma within the past 14 days? No  Did your pain begin within the past week? Yes  Have you been diagnosed with, awaiting test results for, or told that you   are suspected of having COVID-19 (Coronavirus)? (If patient has tested   negative or was tested as a requirement for work, school, or travel and   not based on symptoms, answer no)? No  Do you currently have flu-like symptoms including fever or chills, cough,   shortness of breath, difficulty breathing, or new loss of taste or smell? No  Have you had close contact with someone with COVID-19 in the last 14 days? No  (Service Expert - click yes below to proceed with Momail As Usual   Scheduling)?  Yes

## 2021-09-10 NOTE — TELEPHONE ENCOUNTER
She usually has joint pain   I can see her until next week on Sep 17 at 930    If severe pain may need to go to [facility]

## 2021-09-17 ENCOUNTER — OFFICE VISIT (OUTPATIENT)
Dept: FAMILY MEDICINE CLINIC | Age: 61
End: 2021-09-17
Payer: MEDICAID

## 2021-09-17 ENCOUNTER — TELEPHONE (OUTPATIENT)
Dept: FAMILY MEDICINE CLINIC | Age: 61
End: 2021-09-17

## 2021-09-17 VITALS
RESPIRATION RATE: 16 BRPM | DIASTOLIC BLOOD PRESSURE: 78 MMHG | HEART RATE: 78 BPM | WEIGHT: 167.4 LBS | BODY MASS INDEX: 32.69 KG/M2 | SYSTOLIC BLOOD PRESSURE: 130 MMHG | OXYGEN SATURATION: 98 % | TEMPERATURE: 97.7 F

## 2021-09-17 DIAGNOSIS — K21.9 GASTROESOPHAGEAL REFLUX DISEASE WITHOUT ESOPHAGITIS: ICD-10-CM

## 2021-09-17 DIAGNOSIS — M25.50 ARTHRALGIA, UNSPECIFIED JOINT: Primary | ICD-10-CM

## 2021-09-17 DIAGNOSIS — E03.9 HYPOTHYROIDISM, UNSPECIFIED TYPE: Primary | ICD-10-CM

## 2021-09-17 DIAGNOSIS — R09.82 PND (POST-NASAL DRIP): ICD-10-CM

## 2021-09-17 DIAGNOSIS — J45.20 MILD INTERMITTENT ASTHMA WITHOUT COMPLICATION: Chronic | ICD-10-CM

## 2021-09-17 DIAGNOSIS — M62.830 MUSCLE SPASM OF BACK: ICD-10-CM

## 2021-09-17 DIAGNOSIS — E03.9 HYPOTHYROIDISM, UNSPECIFIED TYPE: ICD-10-CM

## 2021-09-17 DIAGNOSIS — M54.6 CHRONIC BILATERAL THORACIC BACK PAIN: ICD-10-CM

## 2021-09-17 DIAGNOSIS — E53.9 BURNING FEET SYNDROME: ICD-10-CM

## 2021-09-17 DIAGNOSIS — F51.04 CHRONIC INSOMNIA: ICD-10-CM

## 2021-09-17 DIAGNOSIS — G89.29 CHRONIC BILATERAL THORACIC BACK PAIN: ICD-10-CM

## 2021-09-17 DIAGNOSIS — Z12.31 ENCOUNTER FOR SCREENING MAMMOGRAM FOR MALIGNANT NEOPLASM OF BREAST: ICD-10-CM

## 2021-09-17 PROCEDURE — 36415 COLL VENOUS BLD VENIPUNCTURE: CPT | Performed by: FAMILY MEDICINE

## 2021-09-17 PROCEDURE — 99214 OFFICE O/P EST MOD 30 MIN: CPT | Performed by: FAMILY MEDICINE

## 2021-09-17 RX ORDER — GABAPENTIN 100 MG/1
300 CAPSULE ORAL NIGHTLY
Qty: 270 CAPSULE | Refills: 1 | Status: SHIPPED | OUTPATIENT
Start: 2021-09-17 | End: 2021-11-17

## 2021-09-17 RX ORDER — FLUTICASONE PROPIONATE 50 MCG
2 SPRAY, SUSPENSION (ML) NASAL DAILY
Qty: 16 G | Refills: 1 | Status: SHIPPED | OUTPATIENT
Start: 2021-09-17 | End: 2021-11-17

## 2021-09-17 RX ORDER — ACETAMINOPHEN 500 MG
500 TABLET ORAL EVERY 6 HOURS PRN
Qty: 120 TABLET | Refills: 3 | Status: SHIPPED | OUTPATIENT
Start: 2021-09-17 | End: 2022-08-12

## 2021-09-17 RX ORDER — CYCLOBENZAPRINE HCL 5 MG
5 TABLET ORAL 2 TIMES DAILY PRN
Qty: 60 TABLET | Refills: 3 | Status: SHIPPED | OUTPATIENT
Start: 2021-09-17 | End: 2021-11-17

## 2021-09-17 RX ORDER — LEVOTHYROXINE SODIUM 0.05 MG/1
50 TABLET ORAL DAILY
Qty: 90 TABLET | Refills: 5 | Status: SHIPPED | OUTPATIENT
Start: 2021-09-17

## 2021-09-17 RX ORDER — ALBUTEROL SULFATE 90 UG/1
2 AEROSOL, METERED RESPIRATORY (INHALATION) EVERY 6 HOURS PRN
Qty: 1 EACH | Refills: 3 | Status: SHIPPED | OUTPATIENT
Start: 2021-09-17

## 2021-09-17 RX ORDER — OMEPRAZOLE 20 MG/1
CAPSULE, DELAYED RELEASE ORAL
Qty: 90 CAPSULE | Refills: 2 | Status: SHIPPED | OUTPATIENT
Start: 2021-09-17

## 2021-09-17 RX ORDER — CHOLECALCIFEROL (VITAMIN D3) 50 MCG
2000 TABLET ORAL DAILY
Qty: 30 TABLET | Refills: 11 | Status: SHIPPED | OUTPATIENT
Start: 2021-09-17 | End: 2021-11-17

## 2021-09-17 RX ORDER — TRAMADOL HYDROCHLORIDE 50 MG/1
50 TABLET ORAL EVERY 8 HOURS PRN
Qty: 9 TABLET | Refills: 0 | Status: SHIPPED | OUTPATIENT
Start: 2021-09-17 | End: 2021-09-20

## 2021-09-17 RX ORDER — TRAZODONE HYDROCHLORIDE 50 MG/1
TABLET ORAL
Qty: 90 TABLET | Refills: 3 | Status: SHIPPED | OUTPATIENT
Start: 2021-09-17

## 2021-09-17 RX ORDER — PREDNISONE 20 MG/1
20 TABLET ORAL 2 TIMES DAILY
Qty: 14 TABLET | Refills: 0 | Status: SHIPPED | OUTPATIENT
Start: 2021-09-17 | End: 2021-09-24

## 2021-09-17 ASSESSMENT — PATIENT HEALTH QUESTIONNAIRE - PHQ9
2. FEELING DOWN, DEPRESSED OR HOPELESS: 1
SUM OF ALL RESPONSES TO PHQ9 QUESTIONS 1 & 2: 2
1. LITTLE INTEREST OR PLEASURE IN DOING THINGS: 1
SUM OF ALL RESPONSES TO PHQ QUESTIONS 1-9: 2

## 2021-09-17 NOTE — PROGRESS NOTES
Subjective:      Patient ID: Pedro Goddard is a 62 y.o. female. HPI     History provided by pt with the assistance of Sinhala  (on phone)    Here with multiple complaints that are chronic medical conditions   Diagnosis Orders   1. Arthralgia, unspecified joint   Her sx are chronic in nature, worse on hands and now elbows and legs,   Denies erythema but + edema and stiffness. Med tried? Tylenol not helping, her pain is severe and keeping her from doing her regular activities. 3. Chronic insomnia   Not sleeping well because of pain   Takes trazodone with mild relief       4. Gastroesophageal reflux disease without esophagitis   Needs refills      5. Hypothyroidism, unspecified type   {\" I think my thyroid is low\", compliant with med.   + fatigue,         6. Burning feet syndrome   Sx seemed to be stable  Needs refills  No sec effects (gabapentin),         7. Mild intermittent asthma without complication   Has noticed more cough in am sec to \"phlegm \"   + cough in am,   No wheezing , cp, fever , occasional chills. 8. Muscle spasm   Needs refills, muscle relaxant                   9. PND (post-nasal drip)           Review of Systems  Above  has No Known Allergies. Current Outpatient Medications:     cyclobenzaprine (FLEXERIL) 5 MG tablet, Take 1 tablet by mouth 2 times daily as needed for Muscle spasms, Disp: 60 tablet, Rfl: 3    traZODone (DESYREL) 50 MG tablet, TAKE 1 TABLET BY MOUTH EVERY NIGHT, Disp: 90 tablet, Rfl: 3    omeprazole (PRILOSEC) 20 MG delayed release capsule, TAKE ONE CAPSULE BY MOUTH DAILY, Disp: 90 capsule, Rfl: 2    levothyroxine (SYNTHROID) 50 MCG tablet, Take 1 tablet by mouth daily, Disp: 90 tablet, Rfl: 5    gabapentin (NEURONTIN) 100 MG capsule, Take 3 capsules by mouth nightly for 180 days. , Disp: 270 capsule, Rfl: 1    albuterol sulfate HFA (PROVENTIL HFA) 108 (90 Base) MCG/ACT inhaler, Inhale 2 puffs into the lungs every 6 hours as needed for Wheezing or Shortness of Breath (and/or persistent cough), Disp: 1 each, Rfl: 3    acetaminophen (APAP EXTRA STRENGTH) 500 MG tablet, Take 1 tablet by mouth every 6 hours as needed for Pain, Disp: 120 tablet, Rfl: 3    vitamin D (CHOLECALCIFEROL) 50 MCG (2000 UT) TABS tablet, Take 1 tablet by mouth daily, Disp: 30 tablet, Rfl: 11    predniSONE (DELTASONE) 20 MG tablet, Take 1 tablet by mouth 2 times daily for 7 days, Disp: 14 tablet, Rfl: 0    traMADol (ULTRAM) 50 MG tablet, Take 1 tablet by mouth every 8 hours as needed for Pain for up to 3 days. Intended supply: 3 days. Take lowest dose possible to manage pain, Disp: 9 tablet, Rfl: 0    fluticasone (FLONASE) 50 MCG/ACT nasal spray, 2 sprays by Nasal route daily This med is for congestion,  post nasal drip , sneezing, allergies, Disp: 16 g, Rfl: 1    Lidocaine 4 % GEL, Apply on  R shoulder and neck tid prn for pain and spasm, Disp: 60 g, Rfl: 5     has a past medical history of Arthritis, Asthma, Chronic bilateral thoracic back pain, GERD (gastroesophageal reflux disease), Hypothyroidism, Intractable chronic migraine without aura and without status migrainosus, Primary osteoarthritis of both hands, and Thyroid disease. Past Surgical History:   Procedure Laterality Date    HEMORRHOIDECTOMY N/A 9/23/2019    HEMORRHOIDECTOMY performed by Emily Martin MD at [facility]        reports that she has never smoked. She has never used smokeless tobacco. She reports that she does not drink alcohol and does not use drugs. Family history is unknown by patient. Objective:  Blood pressure 130/78, pulse 78, temperature 97.7 °F (36.5 °C), resp. rate 16, weight 167 lb 6.4 oz (75.9 kg), SpO2 98 %, not currently breastfeeding. Physical Exam  Vitals and nursing note reviewed. Constitutional:       General: She is not in acute distress. Appearance: Normal appearance. She is obese.  She is not ill-appearing, toxic-appearing or diaphoretic. HENT:      Head: Normocephalic and atraumatic. Eyes:      Extraocular Movements: Extraocular movements intact. Conjunctiva/sclera: Conjunctivae normal.      Pupils: Pupils are equal, round, and reactive to light. Cardiovascular:      Rate and Rhythm: Normal rate. Pulses: Normal pulses. Heart sounds: Normal heart sounds. Pulmonary:      Effort: Pulmonary effort is normal. No respiratory distress. Breath sounds: Normal breath sounds. No stridor. No wheezing, rhonchi or rales. Chest:      Chest wall: No tenderness. Abdominal:      General: Abdomen is flat. Bowel sounds are normal. There is no distension. Palpations: There is no mass. Tenderness: There is no abdominal tenderness. Hernia: No hernia is present. Musculoskeletal:      Cervical back: Normal range of motion. Comments: Flexed DIP joints both hands     Skin:     General: Skin is warm. Neurological:      General: No focal deficit present. Mental Status: She is alert and oriented to person, place, and time. Mental status is at baseline. Psychiatric:         Behavior: Behavior normal.      Comments: Tearful           Assessment:       Diagnosis Orders   1. Arthralgia, unspecified joint  SEDIMENTATION RATE    CBC Auto Differential    RHEUMATOID FACTOR    TIMA    Cyclic Citrul Peptide Antibody, IgG    predniSONE (DELTASONE) 20 MG tablet    traMADol (ULTRAM) 50 MG tablet    Kaci Drummond MD, Rheumatology, [facility]   2. Encounter for screening mammogram for malignant neoplasm of breast  JYOTI DIGITAL SCREEN W OR WO CAD BILATERAL   3. Chronic insomnia  traZODone (DESYREL) 50 MG tablet   4. Gastroesophageal reflux disease without esophagitis  omeprazole (PRILOSEC) 20 MG delayed release capsule   5. Hypothyroidism, unspecified type  levothyroxine (SYNTHROID) 50 MCG tablet    TSH without Reflex   6.  Burning feet syndrome  gabapentin (NEURONTIN) 100 MG capsule    traMADol (ULTRAM) 50 MG tablet   7. Mild intermittent asthma without complication  albuterol sulfate HFA (PROVENTIL HFA) 108 (90 Base) MCG/ACT inhaler   8. Muscle spasm of back  acetaminophen (APAP EXTRA STRENGTH) 500 MG tablet   9. Chronic bilateral thoracic back pain  acetaminophen (APAP EXTRA STRENGTH) 500 MG tablet   10. PND (post-nasal drip)  fluticasone (FLONASE) 50 MCG/ACT nasal spray           Plan:      1. Deteriorated  Fu labs  Short course with prednisone and tramadol  Controlled Substances Monitoring: Attestation: The Prescription Monitoring Report for this patient was reviewed today. Brandee Mccain MD)  Documentation: No signs of potential drug abuse or diversion identified. Brandee Mccain MD)      Referral to rheumatology     2. Referral     3 refills on all her medications  Continue same medications      4. Add flonase for pnd/allergies. Fu 3 mo.            Nacho Cantrell MD

## 2021-09-18 LAB
ANTI-NUCLEAR ANTIBODY (ANA): NEGATIVE
BASOPHILS ABSOLUTE: 0 K/UL (ref 0–0.2)
BASOPHILS RELATIVE PERCENT: 1.1 %
CYCLIC CITRULLINATED PEPTIDE ANTIBODY IGG: <0.5 U/ML (ref 0–2.9)
EOSINOPHILS ABSOLUTE: 0.2 K/UL (ref 0–0.6)
EOSINOPHILS RELATIVE PERCENT: 4.6 %
HCT VFR BLD CALC: 36.6 % (ref 36–48)
HEMOGLOBIN: 11.6 G/DL (ref 12–16)
LYMPHOCYTES ABSOLUTE: 1.7 K/UL (ref 1–5.1)
LYMPHOCYTES RELATIVE PERCENT: 37.5 %
MCH RBC QN AUTO: 29.9 PG (ref 26–34)
MCHC RBC AUTO-ENTMCNC: 31.7 G/DL (ref 31–36)
MCV RBC AUTO: 94.4 FL (ref 80–100)
MONOCYTES ABSOLUTE: 0.5 K/UL (ref 0–1.3)
MONOCYTES RELATIVE PERCENT: 10.5 %
NEUTROPHILS ABSOLUTE: 2.1 K/UL (ref 1.7–7.7)
NEUTROPHILS RELATIVE PERCENT: 46.3 %
PDW BLD-RTO: 15.1 % (ref 12.4–15.4)
PLATELET # BLD: 137 K/UL (ref 135–450)
PMV BLD AUTO: 11.7 FL (ref 5–10.5)
RBC # BLD: 3.87 M/UL (ref 4–5.2)
RHEUMATOID FACTOR: 11 IU/ML
SEDIMENTATION RATE, ERYTHROCYTE: 31 MM/HR (ref 0–30)
TSH SERPL DL<=0.05 MIU/L-ACNC: 2.41 UIU/ML (ref 0.27–4.2)
WBC # BLD: 4.5 K/UL (ref 4–11)

## 2021-09-27 ENCOUNTER — OFFICE VISIT (OUTPATIENT)
Dept: RHEUMATOLOGY | Age: 61
End: 2021-09-27
Payer: MEDICAID

## 2021-09-27 VITALS
DIASTOLIC BLOOD PRESSURE: 82 MMHG | SYSTOLIC BLOOD PRESSURE: 120 MMHG | WEIGHT: 167 LBS | HEIGHT: 60 IN | BODY MASS INDEX: 32.79 KG/M2

## 2021-09-27 DIAGNOSIS — G89.29 CHRONIC RIGHT SHOULDER PAIN: ICD-10-CM

## 2021-09-27 DIAGNOSIS — R20.3 ABDOMINAL HYPERESTHESIA: Primary | ICD-10-CM

## 2021-09-27 DIAGNOSIS — R41.89 COGNITIVE DECLINE: ICD-10-CM

## 2021-09-27 DIAGNOSIS — M25.511 CHRONIC RIGHT SHOULDER PAIN: ICD-10-CM

## 2021-09-27 PROCEDURE — 99205 OFFICE O/P NEW HI 60 MIN: CPT | Performed by: INTERNAL MEDICINE

## 2021-09-27 NOTE — PROGRESS NOTES
[address], [place]                                                           [address] [place] [address]                                                              (P) [phone] (F)      Note is transcribed using voice recognition software. Inadvertent computerized transcription errors may be present. Patient identification: Becky Green, female : 1960,61 y.o. REASON FOR CONSULTATION:  Patient is being seen at the request of  Kristal Steele MD / Shayy Ware MD for evaluation of chronic body pain. HISTORY OF PRESENT ILLNESS:  62 y.o. female has been experiencing chronic right shoulder pain for last 6 months, to the point that she barely has any range of motion in the affected shoulder. Pain is 24/7, not able to use right upper extremity at all. Does not remember any injury. She also reports ongoing discomfort in her right knee that is getting better after she received cortisone injection from orthopedics. Gets muscle spasms everywhere in her back, legs whenever she tries to walk. Sometimes muscle spasm wake her up from sleep. She also describes skin burning sensation/pain-that moves around in her trunk-lower back, abdomen, chest-only medication that helps is some kind of some kind of over-the-counter \" ointment that burns\"-I wonder if it is some kind of counter pain. She is taking gabapentin, muscle relaxant, trazodone with no help. She does not have any painful swollen or inflamed joints. Review of systems are positive for social isolation, and poor sense of directions. Gets lost frequently when she goes out for the walk, therefore family members feel unsafe sending her out of the house.   Denies any photosensitivity, rashes, pleurisy, GI/ symptoms. Rheumatological work-up negative TIMA, RF.  PMH, PSH,Social history , Meds reviewed. FH-no family history of autoimmune rheumatic disorders. PHYSICAL EXAM:    Vitals:    /82   Ht 5' (1.524 m)   Wt 167 lb (75.8 kg)   BMI 32.61 kg/m²   AA)x3 well nourished, and well groomed, normal judgement. MKS:   Right shoulder-extreme limitation in range of motion by about 90%-active as well as passive. OA changes across her finger joints-asymptomatic Heberden's nodules. Right knee bony crepitus, bony swelling, no effusion, focal tenderness. Subjective pain with flexion and extension. Using cane for ambulation. Otherwise I do not appreciate any focally tender swollen or inflamed joints in upper or lower extremities. She is limping with favoring right knee. Skin: No rashes, no induration or skin thickening or nodules. HEENT: Normal lids, lacrimal glands and pupils. No oral or nasal ulcers. Salivary glands reveal no evidence of abnormality. External inspection of the ears and nose within normal limits. Chest: Normal effort      DATA:       ASSESSMENT AND PLAN:   Diagnosis Orders   1. Abdominal hyperesthesia     2. Chronic right shoulder pain  MRI SHOULDER RIGHT WO CONTRAST   3. Cognitive decline         1. Chronic right shoulder pain with severe limitation range of motion-   X-ray normal, I doubt should be able to do physical therapy given such a limited range of motion. Recommend MRI to evaluate soft tissue pathology. Frozen shoulder is in differential.    2.  Generalized osteoarthritis-mostly symptomatic in her knees. Encouraged patient to do knee exercises as well as walk. Diclofenac gel as needed. 3.  Hyperesthesia of her skin-mainly in her trunk and lower back-unclear etiology, cannot explain symptoms based on anatomical basis other than wonder if she has small fiber neuropathy. Continue gabapentin. Take Flexeril as needed.   Advised patient to call me with the name of the cream that helps her so that I can prescribe it. 4.  Full cognition-states (daughter who is accompanying patient) that she gets lost frequently in her neighborhood,  Often burns food when she tries to cook, forgets eating and drinking,  Forgets to turn off the stove. She also looks depressed. I am not sure if this is part of depression or has decline in cognitive function or if it is just the anxiety and depression from social isolation. Recommend neurology evaluation. Meantime, She perhaps needs some care giver to help with house chores. Advised patient to make follow up with PCP. I will follow up on MRI results. #################################################################################################  Patient indicates understanding and agrees with the management plan. Total time >60  minutes that includes the following-  Preparing to see the patient such as reviewing patients records, pre-charting, preparing the visit on the same day, performing a medically appropriate history and physical examination, counseling and educating patient about diagnosis, management plan, ordering appropriate testings, prescriptions, communicating findings to other care providers, and documenting clinical information in electronic medical record. I thank you for giving me the opportunity to be involved in [facility]'s care and I look forward following Makayla along with you. If you have any questions or concerns please feel free to contact me at any time.   Mary Rodriguez MD 09/27/21

## 2021-11-16 ENCOUNTER — TELEPHONE (OUTPATIENT)
Dept: FAMILY MEDICINE CLINIC | Age: 61
End: 2021-11-16

## 2021-11-16 NOTE — TELEPHONE ENCOUNTER
This is a chronic issue with pains on off  She can take aleve bid prn for severe pain  Ice, followed by heat     [facility] pain [facility]

## 2021-11-17 ENCOUNTER — OFFICE VISIT (OUTPATIENT)
Dept: FAMILY MEDICINE CLINIC | Age: 61
End: 2021-11-17
Payer: MEDICAID

## 2021-11-17 VITALS
RESPIRATION RATE: 16 BRPM | TEMPERATURE: 98.4 F | SYSTOLIC BLOOD PRESSURE: 137 MMHG | WEIGHT: 169.4 LBS | HEART RATE: 66 BPM | OXYGEN SATURATION: 96 % | DIASTOLIC BLOOD PRESSURE: 86 MMHG | HEIGHT: 60 IN | BODY MASS INDEX: 33.26 KG/M2

## 2021-11-17 DIAGNOSIS — M54.2 NECK PAIN: Primary | ICD-10-CM

## 2021-11-17 DIAGNOSIS — Z23 NEED FOR VACCINATION: ICD-10-CM

## 2021-11-17 PROBLEM — B35.3 TINEA PEDIS OF BOTH FEET: Status: RESOLVED | Noted: 2018-04-10 | Resolved: 2021-11-17

## 2021-11-17 PROCEDURE — 90674 CCIIV4 VAC NO PRSV 0.5 ML IM: CPT | Performed by: NURSE PRACTITIONER

## 2021-11-17 PROCEDURE — 99213 OFFICE O/P EST LOW 20 MIN: CPT | Performed by: NURSE PRACTITIONER

## 2021-11-17 PROCEDURE — 90471 IMMUNIZATION ADMIN: CPT | Performed by: NURSE PRACTITIONER

## 2021-11-17 RX ORDER — GABAPENTIN 100 MG/1
300 CAPSULE ORAL NIGHTLY
COMMUNITY

## 2021-11-17 RX ORDER — HYDROCORTISONE 0.5 %
CREAM (GRAM) TOPICAL 3 TIMES DAILY PRN
Qty: 30 G | Refills: 3 | Status: SHIPPED | OUTPATIENT
Start: 2021-11-17

## 2021-11-17 RX ORDER — ALBUTEROL SULFATE 90 UG/1
2 AEROSOL, METERED RESPIRATORY (INHALATION) EVERY 6 HOURS PRN
Qty: 1 EACH | Refills: 3 | Status: CANCELLED | OUTPATIENT
Start: 2021-11-17

## 2021-11-17 SDOH — ECONOMIC STABILITY: FOOD INSECURITY: WITHIN THE PAST 12 MONTHS, THE FOOD YOU BOUGHT JUST DIDN'T LAST AND YOU DIDN'T HAVE MONEY TO GET MORE.: NEVER TRUE

## 2021-11-17 SDOH — ECONOMIC STABILITY: FOOD INSECURITY: WITHIN THE PAST 12 MONTHS, YOU WORRIED THAT YOUR FOOD WOULD RUN OUT BEFORE YOU GOT MONEY TO BUY MORE.: NEVER TRUE

## 2021-11-17 ASSESSMENT — ENCOUNTER SYMPTOMS
RESPIRATORY NEGATIVE: 1
GASTROINTESTINAL NEGATIVE: 1

## 2021-11-17 ASSESSMENT — SOCIAL DETERMINANTS OF HEALTH (SDOH): HOW HARD IS IT FOR YOU TO PAY FOR THE VERY BASICS LIKE FOOD, HOUSING, MEDICAL CARE, AND HEATING?: NOT HARD AT ALL

## 2021-11-17 NOTE — PROGRESS NOTES
2021     Makayla Shukla (:  1960) is a 62 y.o. female, here for evaluation of the following medical concerns:    Chief Complaint   Patient presents with    Shoulder Pain     R sided shoulder/ back pain x 7      #: [id number] used for entire visit. Patient arrives for chronic right shoulder and neck pain. She was recently seen by rheumatology for the same. She states she lost her MRI order of the shoulder and has not had it completed. She would like a copy of this order so she can have the testing completed. She was also asked to see Dr. Darci Gonzales and has not followed up with ortho. States pain is radiating from neck to hand. States it is nerve pain. She states the pain is a burning sensation and the cream she has used in the past was very helpful however she wants a script for this so she does not have to pay for it. We discussed that some items are not covered under insurance. Additionally, the patient states her birthday is wrong and she is actually 60years old. She states she does not want this to be changed as she has no documentation to prove this and all of her other documentation show the birth date of [birth year]. I stressed to her that it is important to have the correct documentation on charts and she refused to give her actual birth date. Review of Systems   Constitutional: Negative. Respiratory: Negative. Cardiovascular: Negative. Gastrointestinal: Negative. Genitourinary: Negative. Musculoskeletal: Positive for neck pain. Right neck pain, shoulder, arm and hand. Neurological: Negative. Prior to Visit Medications    Medication Sig Taking? Authorizing Provider   gabapentin (NEURONTIN) 100 MG capsule Take 300 mg by mouth nightly.   Yes Historical Provider, MD   methyl salicylate-menthol (MICHOACANO OLIVARES GREASELESS) 10-15 % CREA Apply topically 3 times daily as needed for Pain Yes Yaz Bonner, APRN - CNP   traZODone (DESYREL) 50 MG tablet TAKE 1 TABLET BY Lindsay Boyer MD   omeprazole (PRILOSEC) 20 MG delayed release capsule TAKE ONE CAPSULE BY MOUTH DAILY Yes Jm Mayfield MD   levothyroxine (SYNTHROID) 50 MCG tablet Take 1 tablet by mouth daily Yes Jm Mayfield MD   albuterol sulfate HFA (PROVENTIL HFA) 108 (90 Base) MCG/ACT inhaler Inhale 2 puffs into the lungs every 6 hours as needed for Wheezing or Shortness of Breath (and/or persistent cough) Yes Jm Mayfield MD   acetaminophen (APAP EXTRA STRENGTH) 500 MG tablet Take 1 tablet by mouth every 6 hours as needed for Pain Yes Jm Mayfield MD        Social History     Tobacco Use    Smoking status: Never Smoker    Smokeless tobacco: Never Used   Substance Use Topics    Alcohol use: No     Alcohol/week: 0.0 standard drinks    Drug use: No        Vitals:    11/17/21 0950   BP: 137/86   Pulse: 66   Resp: 16   Temp: 98.4 °F (36.9 °C)   TempSrc: Temporal   SpO2: 96%   Weight: 169 lb 6.4 oz (76.8 kg)   Height: 5' (1.524 m)     Estimated body mass index is 33.08 kg/m² as calculated from the following:    Height as of this encounter: 5' (1.524 m). Weight as of this encounter: 169 lb 6.4 oz (76.8 kg). Physical Exam  Vitals and nursing note reviewed. Constitutional:       General: She is not in acute distress. Appearance: Normal appearance. She is normal weight. She is not ill-appearing. Cardiovascular:      Rate and Rhythm: Normal rate and regular rhythm. Heart sounds: Normal heart sounds, S1 normal and S2 normal. No murmur heard. Pulmonary:      Effort: Pulmonary effort is normal.      Breath sounds: Normal breath sounds and air entry. Musculoskeletal:      Cervical back: Tenderness present. Pain with movement present. Decreased range of motion. Back:    Skin:     General: Skin is warm and dry. Capillary Refill: Capillary refill takes less than 2 seconds. Neurological:      General: No focal deficit present.       Mental Status: She is alert. Psychiatric:         Mood and Affect: Mood normal.     ASSESSMENT/PLAN:  1. Neck pain  Please schedule MRI and return for results. Start stretches and do daily  - methyl salicylate-menthol (MICHOACANO OLIVARES GREASELESS) 10-15 % CREA; Apply topically 3 times daily as needed for Pain  Dispense: 30 g; Refill: 3  - MRI CERVICAL SPINE WO CONTRAST; Future    2. Need for vaccination  Given today  - INFLUENZA, MDCK QUADV, 2 YRS AND OLDER, IM, PF, PREFILL SYR OR SDV, 0.5ML (FLUCELVAX QUADV, PF)      Return if symptoms worsen or fail to improve, for after MRI completed.

## 2021-11-17 NOTE — PATIENT INSTRUCTIONS
Patient Education        Neck Pain: Care Instructions  Your Care Instructions     You can have neck pain anywhere from the bottom of your head to the top of your shoulders. It can spread to the upper back or arms. Injuries, painting a ceiling, sleeping with your neck twisted, staying in one position for too long, and many other activities can cause neck pain. Most neck pain gets better with home care. Your doctor may recommend medicine to relieve pain or relax your muscles. He or she may suggest exercise and physical therapy to increase flexibility and relieve stress. You may need to wear a special (cervical) collar to support your neck for a day or two. Follow-up care is a key part of your treatment and safety. Be sure to make and go to all appointments, and call your doctor if you are having problems. It's also a good idea to know your test results and keep a list of the medicines you take. How can you care for yourself at home? · Try using a heating pad on a low or medium setting for 15 to 20 minutes every 2 or 3 hours. Try a warm shower in place of one session with the heating pad. · You can also try an ice pack for 10 to 15 minutes every 2 to 3 hours. Put a thin cloth between the ice and your skin. · Take pain medicines exactly as directed. ? If the doctor gave you a prescription medicine for pain, take it as prescribed. ? If you are not taking a prescription pain medicine, ask your doctor if you can take an over-the-counter medicine. · If your doctor recommends a cervical collar, wear it exactly as directed. When should you call for help? Call your doctor now or seek immediate medical care if:    · You have new or worsening numbness in your arms, buttocks or legs.     · You have new or worsening weakness in your arms or legs. (This could make it hard to stand up.)     · You lose control of your bladder or bowels.    Watch closely for changes in your health, and be sure to contact your doctor if:    · Your neck pain is getting worse.     · You are not getting better after 1 week.     · You do not get better as expected. Where can you learn more? Go to [email or web address] and sign in to your [employer] account. Enter [id number] in the [facility] box to learn more about \"Neck Pain: Care Instructions. \"     If you do not have an account, please click on the \"Sign Up Now\" link. Current as of: July 1, 2021               Content Version: 13.0  © 0726-7280 [employer]. Care instructions adapted under license by [facility] ([facility]). If you have questions about a medical condition or this instruction, always ask your healthcare professional. [address] any warranty or liability for your use of this information. Patient Education        Neck: Exercises  Introduction  Here are some examples of exercises for you to try. The exercises may be suggested for a condition or for rehabilitation. Start each exercise slowly. Ease off the exercises if you start to have pain. You will be told when to start these exercises and which ones will work best for you. How to do the exercises  Neck stretch    1. This stretch works best if you keep your shoulder down as you lean away from it. To help you remember to do this, start by relaxing your shoulders and lightly holding on to your thighs or your chair. 2. Tilt your head toward your shoulder and hold for 15 to 30 seconds. Let the weight of your head stretch your muscles. 3. If you would like a little added stretch, use your hand to gently and steadily pull your head toward your shoulder. For example, keeping your right shoulder down, lean your head to the left. 4. Repeat 2 to 4 times toward each shoulder. Diagonal neck stretch    1. Turn your head slightly toward the direction you will be stretching, and tilt your head diagonally toward your chest and hold for 15 to 30 seconds.   2. If you would like a little added stretch, use your hand to gently and steadily pull your head forward on the diagonal.  3. Repeat 2 to 4 times toward each side. Dorsal glide stretch    The dorsal glide stretches the back of the neck. If you feel pain, do not glide so far back. Some people find this exercise easier to do while lying on their backs with an ice pack on the neck. 1. Sit or stand tall and look straight ahead. 2. Slowly tuck your chin as you glide your head backward over your body  3. Hold for a count of 6, and then relax for up to 10 seconds. 4. Repeat 8 to 12 times. Chest and shoulder stretch    1. Sit or stand tall and glide your head backward as in the dorsal glide stretch. 2. Raise both arms so that your hands are next to your ears. 3. Take a deep breath, and as you breathe out, lower your elbows down and behind your back. You will feel your shoulder blades slide down and together, and at the same time you will feel a stretch across your chest and the front of your shoulders. 4. Hold for about 6 seconds, and then relax for up to 10 seconds. 5. Repeat 8 to 12 times. Strengthening: Hands on head    1. Move your head backward, forward, and side to side against gentle pressure from your hands, holding each position for about 6 seconds. 2. Repeat 8 to 12 times. Follow-up care is a key part of your treatment and safety. Be sure to make and go to all appointments, and call your doctor if you are having problems. It's also a good idea to know your test results and keep a list of the medicines you take. Where can you learn more? Go to [email or web address] and sign in to your [employer]zu account. Enter P975 in the [employer] box to learn more about \"Neck: Exercises. \"     If you do not have an account, please click on the \"Sign Up Now\" link. Current as of: July 1, 2021               Content Version: 13.0  © 0120-4618 Healthwise, Incorporated.    Care instructions adapted under license by [facility] ([facility]). If you have questions about a medical condition or this instruction, always ask your healthcare professional. [address] any warranty or liability for your use of this information. Patient Education        ?????? ??????: ??????? ???????????   Neck Pain: Care Instructions  ??????? ??????? ???????????     ??????? ??????? ?? ???? ??????? ??????? ???? ???????? ???????? ????? ?????? ??? ????? ?? ???????? ??? ?? ????????? ???? ???? ????? ?????????, ????? ????? ?????, ????? ???????? ??????? ????????, ???? ??????? ?? ???????? ???? ?????? ? ???? ??? ??????????????? ??????? ????? ??? ?????  ??????? ????? ?????? ????? ????????? ???? ?????? ??????? ???????? ???????? ???? ????? ?? ??????? ?????????????? ???? ???? ???? ??????? ???? ??????????? ????? ????? ? ???? ????? ??????? ??????? ? ??????? ???????? ????? ??? ??????????? ??????? ???????? ?????? ???? ?? ??? ??? ??????? ????? (???????)????? ????? ?????? ???? ???????  ???-?? ?????? ??????? ????? ??? ????????? ????? ??? ??? ??? ????????????? ??? ??? ????? ??????? ????????? ???? ??? ???????? ????????? ??????? ? ??? ????? ????????? ?? ?????????? ????? ?????? ????????? ???? ????? ??? ????? ???? ????????? ???? ?????? ??? ?? ?????? ????? ???  ??????? ???? ????? ??????? ???? ???? ???????????  · ???????? 2 ?? 3 ??????? 15 ???? 20 ??????? ???? ?? ?? ?????? ??????? ????? ??????? ?????? ????? ?????? ?????????? ????? ???????? ?? ?????? ??????? ?????? ?????? ?????? ?????????  · ??????? ???????? 2 ???? 3 ??????? 10 ???? 15 ??????? ???? ??? ??????? ??? ?????? ???? ??????????? ??? ? ??????? ?????? ????? ?? ????? ???? ???????????  · ???????? ??? ?????? ??????? ??????? ???? ??????????  ? ???????? ???????? ??????? ???? ????????? ???? ????????? ???, ???????? ??? ?????? ???? ??????????  ? ??? ??????? ??????? ????????? ???? ???? ??????? ???? ??? ???, ??????? ???????? ???????? ???? ???? ??????? ???? ?????????? ?? ?????????? ??? ????? ????????? ????????????  · ??????? ???????? ??????? ??????? ??????? ?????????? ???,????????? ????? ??????? ???????????  ??????? ??????? ???? ????? ??? ??????????  ????? ????????? ????? ?? ????????? ?? ???????? ???????? ??????? ??????????, ???:  · ??????? ??????, ?????? ?? ????????? ???? ???? ?? ??????? ??????  · ??????? ?????? ?? ??????????? ???? ?????? ???? ?? ??????? ?????? (???? ???? ?????? ????? ?????)  · ??????? ????? ????????? ?? ????? ????????? ????????????  ??????? ??????????? ???? ????????????? ???? ???????? ??????? ????????? ? ??? ????? ???? ????? ????????? ??????? ???? ??????? ?????????:  · ??????? ??????? ????? ?? ???? ????? ?????  · ????? 1 ???????? ????? ???????? ????  · ????? ??????? ??? ?????? ???? ???????? ????  ??????? ?? ???? ????? ???????????  ???? ????????   [email or web address]  ???????? ????????? V723 ?????? ?? ????? ??? ?????? \"?????? ??????: ??????? ???????????.\"  ?? ????? ???: 2021 07 01               ????????? ?????: 13.0  © 9761-5138 Healthwise, Incorporated. ???????? ?????????????? ?????? ???????? ???????? ??????? ????????? ?????? ????? ??????? ??? ???????? ?? ???????? ?????? ?? ?? ?????????? ?????? ????????? ??? ???, ???? ????? ????????? ?????? ????? ????????? ??????????? Healthwise, Incorporated, ?? ??????? ?? ????????? ???????? ???? ???? ??? ???????? ?? ?????????? ???????? ?????? Patient Education        ? ????: ?????????? Neck: Exercises  ? ????  ???? ??????? ???? ?????? ?????????? ???????????? ???? ????????? ???? ?? ?????????? ?????? ?? ?????????????? ???? ??????? ???????? ???????? ??????? ???????? ???? ?????????? ??? ???????? ???? ??? ???? ???? ? ??? ?????????? ?? ??????????  ???????? ?? ?????????? ????? ??????? ????? ? ??????? ???? ??? ????? ???????? ????? ????? ???? ??????? ??????  ?????????? ???? ?????  ????? ????????????    5. ????? ??????? ???? ???????? ??????? ????? ???? ?? ?????????? ?? ???????? ?????? ?????? ?? ???? ???????? ??????? ????? ?????? ????, ??????? ??????? ???? ???? ???? ????????? ? ????? ????? ????? ?????? ?? ???????? ???????????  6. ????? ???? ????? ???????? ??????????? ? 15 ???? 30 ??????????? ??? ??? ??????????? ??????? ??????? ????? ??????? ?????????????? ??????? ?????????  7. ???????? ?? ?????? ???????, ????? ?????? ???????? ? ???????????? ??????? ????? ??????? ????? ?????? ?????????? ???????? ????, ??????? ????? ??????? ?? ?????, ????? ????? ???????? ????????????  8. ???? ??????? 2 ???? 4 ??? ?????????????????  ?????? ??? ???????    4. ????? ???????? ???????? ????? ????? ?????? ???????????, ? ????? ???????? ????? ????? ????? ???????? ???????????? ? 15 ???? 30 ??????????? ???????????  5. ??? ???????? ???? ?? ??????? ?? ??????, ????? ????? ????? ????? ???????? ? ????? ????? ????? ????? ??? ?????? ??????????  6. ???????? ?????? 2 ???? 4 ??????? ?????????????????  ????? ?????? ???????    ????? ???????? ??????? ????? ???????? ??????? ????? ????? ???????? ??? ???? ????????? ?????? ??????????? ???? ?????????? ?? ??????? ????? ????? ????? ??????? ??? ????? ????? ????? ????? ????????????  5. ????? ??? ????????? ?? ????????? ? ???? ????? ???????????  6. ??????? ????? ???????? ???? ????? ?????? ????? ???????? ????? ????????? ??????????  7. 6 ?????????? ????? ????????? ? ??????? 10 ??????????? ???? ??????????  8. 8 ???? 12 ??? ?????????????????  ???? ? ???? ???????    6. ????? ?????? ????????? ????? ????????? ?? ?????????? ? ??????? ???????? ?????? ??????????  7. ???? ???????????? ?????????? ?????? ??????? ?????? ??????? ????? ????? ????????  8. ???? ??? ???????? ? ??????? ??? ?????? ??????? ????????? ????? ????? ? ??????? ?????? ???????? ??????????? ??????? ????? ?????? ???????? ?????? ???? ?? ??????? ???? ????? ?????????? ? ??? ????? ??????? ????? ???? ? ??????? ????????? ???????? ???????? ???? ????? ???????????  9. 6 ??????????? ????? ????????? ? ??????? 10 ??????????? ????? ?????????  10. 8 ???? 12 ??? ?????????????????  ????? ??????: ??????? ??????    3. ??????? ????? ????? ????? ???????????, ????? ??? ?????? ???? ???? ?? ?????? ????? ?????? ???????????, ?? ???? ?????? ???? 6 ??????????? ??????????  4. 8 ???? 12 ??? ?????????????????  ???-?? ?????? ??????? ????? ??? ????????? ????? ??? ??? ??? ????????????? ??? ??? ????? ??????? ????????? ???? ??? ???????? ????????? ??????? ? ??? ????? ????????? ?? ?????????? ????? ?????? ????????? ???? ????? ??? ????? ???? ????????? ???? ?????? ??? ?? ?????? ????? ???  ??????? ?? ???? ????? ???????????  ???? ????????   [email or web address]  ???????? ????????? P975 ?????? ?? ????? ??? ?????? \"?????: ??????????.\"  ?? ????? ???: 2021 07 01               ????????? ?????: 13.0  © 4320-4957 Healthwise, Incorporated. ???????? ?????????????? ?????? ???????? ???????? ??????? ????????? ?????? ????? ??????? ??? ???????? ?? ???????? ?????? ?? ?? ?????????? ?????? ????????? ??? ???, ???? ????? ????????? ?????? ????? ????????? ???????????  Healthwise, Incorporated, ?? ??????? ?? ????????? ???????? ???? ???? ??? ???????? ?? ?????????? ???????? ??????

## 2021-12-08 ENCOUNTER — HOSPITAL ENCOUNTER (OUTPATIENT)
Dept: MRI IMAGING | Age: 61
Discharge: HOME | End: 2021-12-08
Payer: MEDICAID

## 2021-12-08 DIAGNOSIS — M25.511 CHRONIC RIGHT SHOULDER PAIN: ICD-10-CM

## 2021-12-08 DIAGNOSIS — G89.29 CHRONIC RIGHT SHOULDER PAIN: ICD-10-CM

## 2021-12-08 DIAGNOSIS — M54.2 NECK PAIN: ICD-10-CM

## 2021-12-08 PROCEDURE — 72141 MRI NECK SPINE W/O DYE: CPT

## 2021-12-08 PROCEDURE — 73221 MRI JOINT UPR EXTREM W/O DYE: CPT

## 2021-12-09 DIAGNOSIS — M50.123 CERVICAL DISC DISORDER AT C6-C7 LEVEL WITH RADICULOPATHY: Primary | ICD-10-CM

## 2022-01-04 ENCOUNTER — PATIENT MESSAGE (OUTPATIENT)
Dept: FAMILY MEDICINE CLINIC | Age: 62
End: 2022-01-04

## 2022-01-04 NOTE — TELEPHONE ENCOUNTER
Are you ok with signing form? Can hold form for you tomorrow for review. Consent Type: Consent 1 (Standard)

## 2022-01-10 ENCOUNTER — OFFICE VISIT (OUTPATIENT)
Dept: ORTHOPEDIC SURGERY | Age: 62
End: 2022-01-10
Payer: MEDICAID

## 2022-01-10 VITALS — WEIGHT: 169.31 LBS | HEIGHT: 60 IN | BODY MASS INDEX: 33.24 KG/M2

## 2022-01-10 DIAGNOSIS — M67.911 TENDINOPATHY OF ROTATOR CUFF, RIGHT: ICD-10-CM

## 2022-01-10 DIAGNOSIS — M50.30 DDD (DEGENERATIVE DISC DISEASE), CERVICAL: ICD-10-CM

## 2022-01-10 DIAGNOSIS — M47.812 CERVICAL SPONDYLOSIS: ICD-10-CM

## 2022-01-10 DIAGNOSIS — M54.2 NECK PAIN: ICD-10-CM

## 2022-01-10 DIAGNOSIS — M50.223 HERNIATION OF INTERVERTEBRAL DISC AT C6-C7 LEVEL: ICD-10-CM

## 2022-01-10 DIAGNOSIS — M75.111 NONTRAUMATIC INCOMPLETE TEAR OF RIGHT ROTATOR CUFF: ICD-10-CM

## 2022-01-10 PROCEDURE — 20611 DRAIN/INJ JOINT/BURSA W/US: CPT | Performed by: INTERNAL MEDICINE

## 2022-01-10 PROCEDURE — 99204 OFFICE O/P NEW MOD 45 MIN: CPT | Performed by: INTERNAL MEDICINE

## 2022-01-10 RX ORDER — BUPIVACAINE HYDROCHLORIDE 2.5 MG/ML
4 INJECTION, SOLUTION INFILTRATION; PERINEURAL ONCE
Status: COMPLETED | OUTPATIENT
Start: 2022-01-10 | End: 2022-01-10

## 2022-01-10 RX ORDER — BETAMETHASONE SODIUM PHOSPHATE AND BETAMETHASONE ACETATE 3; 3 MG/ML; MG/ML
6 INJECTION, SUSPENSION INTRA-ARTICULAR; INTRALESIONAL; INTRAMUSCULAR; SOFT TISSUE ONCE
Status: COMPLETED | OUTPATIENT
Start: 2022-01-10 | End: 2022-01-10

## 2022-01-10 RX ADMIN — BUPIVACAINE HYDROCHLORIDE 10 MG: 2.5 INJECTION, SOLUTION INFILTRATION; PERINEURAL at 17:04

## 2022-01-10 RX ADMIN — BETAMETHASONE SODIUM PHOSPHATE AND BETAMETHASONE ACETATE 6 MG: 3; 3 INJECTION, SUSPENSION INTRA-ARTICULAR; INTRALESIONAL; INTRAMUSCULAR; SOFT TISSUE at 17:03

## 2022-01-10 NOTE — LETTER
[address]  [address] [facility] [address]  Phone: [phone]  Fax: [phone]           Jairo Castañeda MD      January 11, 2022     Patient: Michelle Covington   MR Number: [id number]   YOB: 1960   Date of Visit: 1/10/2022       Dear Dr. Rhea Mendez ref. provider found: Thank you for referring Michelle Covington to me for evaluation/treatment. Below are the relevant portions of my assessment and plan of care. Impression: . Encounter Diagnoses   Name Primary?  Nontraumatic incomplete tear of right rotator cuff     Tendinopathy of rotator cuff, right     Neck pain               Plan:     Postinjection protocol-right shoulder  Activity modification rotator cuff protocol  Continue Naprosyn twice daily as needed with GI precaution  Clinical follow-up in 3 weeks  Consider limited ultrasound evaluation. Corroborate findings with respect to pathology supraspinatus tendon evident on MRI  Consider MRI reread right shoulder ProScan imaging. Overall I believe the primary pain generator relates to the rotator cuff and I do not believe her pain is of radicular etiology at this time or that a significant component of pain is of radicular etiology. Low clinical suspicion for adhesive capsulitis at this time. There are no upper motor neuron findings to suggest active cervical myelopathy at this time. Proceed as outlined above consider further management options related to cervical spine pending response to the aforementioned treatment plan.       Approximately  45minutes was spent related to previewing pertinent medical documentation prior to the patient's visit along with counseling during the patient's visit with respect to treatment options inclusive of alternatives to treatment and the complications and risks related to those treatment options along with expectations of outcome related to those treatments and inclusive of time in the documentation and ordering of testing and treatment after the visit. The nature of the finding, probable diagnosis and likely treatment was thoroughly discussed with the patient and adult child. The options, risks, complications, alternative treatment as well as some of the differential diagnosis was discussed. The patient was thoroughly informed and all questions were answered. the patient indicated understanding and satisfaction with the discussion. Orders:      No orders of the defined types were placed in this encounter. Disclaimer: \"This note was dictated with voice recognition software. Though review and correction are routine, we apologize for any errors. \"           If you have questions, please do not hesitate to call me. I look forward to following Makayla along with you.     Sincerely,        Anson Holman MD    CC providers:  JANIE Polanco - CNP  [address] [place], [place] [id number]  [employer] [employer]

## 2022-01-10 NOTE — PROGRESS NOTES
Chief Complaint:   Chief Complaint   Patient presents with    Back Pain     right post scap burning and pain, N/T and pain into R hand          History of Present Illness:       Patient is a 62 y.o. female presents with the above complaint. She is seen in consultation at the request of Claire Velarde CNP. The symptoms began 1 yearsago and started without an injury. The patient describes a sharp, aching, burning pain that does radiate. The symptoms are intermittent  and are are worsening since the onset.  was available today to facilitate the office visit. After initial evaluation of the patient on physical examination today it became apparent that her shoulder is the primary focus of pain. The symptoms do show a typical rotator cuff provacative pattern. The pain is diffuse about the shoulder. There is not associated mechanical symptoms localizing to the shoulder. The patient denies symptoms of instability about the shoulder. Treatment to date has included prescription NSAIDS which are Mildly effective-Naprosyn    Pain levels 8. The symptoms of shoulder pain do not correlate with head and neck movement. The patient admits to new onset weakness of the upper extremity. The patient does not have history of prior shoulder trauma. There is no history or autoimmune disease, inflammatory arthropathy or crystal arthropathy.            Past Medical History:        Past Medical History:   Diagnosis Date    Arthritis     Asthma     Chronic bilateral thoracic back pain 6/13/2018    GERD (gastroesophageal reflux disease)     Hypothyroidism 7/21/2021    Intractable chronic migraine without aura and without status migrainosus 7/29/2016    Primary osteoarthritis of both hands 10/20/2015    Thyroid disease          Past Surgical History:   Procedure Laterality Date    HEMORRHOIDECTOMY N/A 9/23/2019    HEMORRHOIDECTOMY performed by Santiago Carlos MD at [facility] Medications:         Current Outpatient Medications   Medication Sig Dispense Refill    gabapentin (NEURONTIN) 100 MG capsule Take 300 mg by mouth nightly.  methyl salicylate-menthol (MICHOACANO OLIVARES GREASELESS) 10-15 % CREA Apply topically 3 times daily as needed for Pain 30 g 3    traZODone (DESYREL) 50 MG tablet TAKE 1 TABLET BY MOUTH EVERY NIGHT 90 tablet 3    omeprazole (PRILOSEC) 20 MG delayed release capsule TAKE ONE CAPSULE BY MOUTH DAILY 90 capsule 2    levothyroxine (SYNTHROID) 50 MCG tablet Take 1 tablet by mouth daily 90 tablet 5    albuterol sulfate HFA (PROVENTIL HFA) 108 (90 Base) MCG/ACT inhaler Inhale 2 puffs into the lungs every 6 hours as needed for Wheezing or Shortness of Breath (and/or persistent cough) 1 each 3    acetaminophen (APAP EXTRA STRENGTH) 500 MG tablet Take 1 tablet by mouth every 6 hours as needed for Pain 120 tablet 3     No current facility-administered medications for this visit. Allergies:      No Known Allergies     Social History:         Social History     Socioeconomic History    Marital status:      Spouse name: Not on file    Number of children: Not on file    Years of education: Not on file    Highest education level: Not on file   Occupational History    Not on file   Tobacco Use    Smoking status: Never Smoker    Smokeless tobacco: Never Used   Substance and Sexual Activity    Alcohol use: No     Alcohol/week: 0.0 standard drinks    Drug use: No    Sexual activity: Never   Other Topics Concern    Not on file   Social History Narrative    Not on file     Social Determinants of Health     Financial Resource Strain: Low Risk     Difficulty of Paying Living Expenses: Not hard at all   Food Insecurity: No Food Insecurity    Worried About [employer] [employer] in the Last Year: Never true    [facility] in the Last Year: Never true   Transportation Needs:     Lack of Transportation (Medical):  Not on file    Lack of Transportation (Non-Medical): Not on file   Physical Activity:     Days of Exercise per Week: Not on file    Minutes of Exercise per Session: Not on file   Stress:     Feeling of Stress : Not on file   Social Connections:     Frequency of Communication with Friends and Family: Not on file    Frequency of Social Gatherings with Friends and Family: Not on file    Attends Confucianist Services: Not on file    Active Member of [address] [place] [address] or Organizations: Not on file    Attends Club or Organization Meetings: Not on file    Marital Status: Not on file   Intimate Partner Violence:     Fear of Current or Ex-Partner: Not on file    Emotionally Abused: Not on file    Physically Abused: Not on file    Sexually Abused: Not on file   Housing Stability:     Unable to Pay for Housing in the Last Year: Not on file    Number of [place] in the Last Year: Not on file    Unstable Housing in the Last Year: Not on file        Review of Symptoms:    Pertinent items are noted in HPI    Review of systems reviewed from Patient History Form dated on   Today's date and   available in the patient's chart under the Media tab. Vital Signs: There were no vitals filed for this visit. General Exam:     Constitutional: Patient is adequately groomed with no evidence of malnutrition  Mental Status: The patient is oriented to time, place and person. The patient's mood and affect are appropriate. Vascular: Examination reveals no swelling or calf tenderness. Peripheral pulses are palpable and 2+.     Lymphatics: no lymphadenopathy of the inguinal region or lower extremity      Physical Exam: right shoulder      Primary Exam:    Inspection: No deformity atrophy appreciable effusion      Palpation: No focal tenderness      Range of Motion: Active range of motion forward elevation 90 to 95 degrees limited by pain, passive range of motion to 130 degrees limited by pain and involuntary guarding, external rotation 50 degrees bilaterally, internal rotation active to the buttock, passive limited to L3/L4 associated with pain and involuntary guarding, abduction active assisted 150 degrees associated pain and involuntary guarding on the right under 55 degrees on the left      Strength: Submaximal resisted normal in forward elevation and AB duction with mild pain near normal with internal rotation and external rotation      Special Tests: Proximal biceps signs negative subscapularis signs negative, negative drop arm sign      Skin: There are no rashes, ulcerations or lesions. Gait: Nonantalgic      Reflex intact upper     Additional Comments:        Additional Examinations:           Left Upper Extremity: Examination of the left upper extremity does not show any tenderness, deformity or injury. Range of motion is unremarkable. There is no gross instability. There are no rashes, ulcerations or lesions. Strength and tone are normal.  Neck: Examination of the neck does not show any tenderness, deformity or injury. Range of motion is mild globally restricted. .  There is no gross instability. There are no rashes, ulcerations or lesions. Strength and tone are normal.  Neurologic -Light touch sensation and manual muscle testing is normal C5-C8 . Biceps and triceps reflexes are symmetric and +2. Negative Kait sign. Office Imaging Results/Procedures PerformedToday:             Office Procedures:   No orders of the defined types were placed in this encounter. Logic E ultrasound/ 10 HZ  Ultrasound-guided injection of the shoulder    The patient was placed supine on the examination table with the     LT         upper extremity slightly internally rotated. The ultrasound was placed on shoulder present function image optimization was obtained using the linear transducer. The transducer was placed in a coronal oblique position over the anterolateral shoulder and the rotator cuff was easily identifiable.   After sterile preparation and the use of topical anesthetic, 25-gauge needle was advanced using longitudinal technique just superficial to the rotator cuff tendon and the subacromial bursa was hydrodissected with 4 mL of 0.25 % Marcaine and  1 mL of Celestone. The patient tolerated this with minimal discomfort. Band-Aid tooth puncture wound. Image stored. Technically successful injection          Other Outside Imaging and Testing Personally Reviewed:      EXAMINATION:   MRI OF THE CERVICAL SPINE WITHOUT CONTRAST 12/8/2021 1:31 pm       TECHNIQUE:   Multiplanar multisequence MRI of the cervical spine was performed without the   administration of intravenous contrast.       COMPARISON:   None.       HISTORY:   ORDERING SYSTEM PROVIDED HISTORY: Neck pain       FINDINGS:   BONES/ALIGNMENT: There is loss of the normal cervical lordotic curvature. The vertebral body heights are maintained.  The bone marrow signal appears   unremarkable.  9 mm intraosseous hemangioma involving C6 vertebral body   without evidence of acute pathologic fracture.       SPINAL CORD: No abnormal cord signal is seen.       SOFT TISSUES: No paraspinal mass identified.       C2-C3: There is no significant disc protrusion, spinal canal stenosis or   neural foraminal narrowing.       C3-C4: There is no significant disc protrusion, spinal canal stenosis or   neural foraminal narrowing.       C4-C5: There is no significant disc protrusion, spinal canal stenosis or   neural foraminal narrowing.       C5-C6: Posterior disc osteophyte complex resulting in mild canal stenosis   without cord contact.  Mild bilateral foraminal stenosis related to   uncovertebral and facet hypertrophy.       C6-C7: Broad-based left subarticular disc protrusion measuring 5 mm in AP   thickness resulting in eccentric left mild cord compression.  No significant   foraminal stenosis.       C7-T1: There is no significant disc protrusion, spinal canal stenosis or   neural foraminal narrowing.           Impression 1. Eccentric left mild cord compression at C6-7 related to left subarticular   disc protrusion.  No expansile edematous cord signal identified.    2. 9 mm intraosseous hemangioma involving the C6 vertebral body without acute   pathologic fracture.         EXAMINATION:   MRI OF THE RIGHT SHOULDER WITHOUT CONTRAST   12/8/2021 1:30 pm       TECHNIQUE:   Multiplanar multisequence MRI of the right shoulder was performed without the   administration of intravenous contrast.       COMPARISON:   Right shoulder plain radiographs performed on 07/21/2015       HISTORY:   ORDERING SYSTEM PROVIDED HISTORY: Chronic right shoulder pain   TECHNOLOGIST PROVIDED HISTORY:   Reason for Exam:  PT STS NO KNOWN INJURY TO CERVICAL OR RIGHT SHOULDER       81-year-old female with chronic right shoulder pain       FINDINGS:   ROTATOR CUFF: Small amount of fluid in the subacromial subdeltoid bursa.       Shallow partial-thickness articular-surface tearing of supraspinatus between   critical zone and footplate.  Mild-to-moderate supraspinatus tendinosis.       Mild infraspinatus tendinosis.       Subscapularis and teres minor muscle/tendon appear grossly intact without   evidence of tearing.       No significant atrophy or fatty degeneration of the visualized rotator cuff   musculature.       BICEPS TENDON: Long head of the biceps tendon properly located in the   bicipital groove and seen extending to the biceps labral anchor.       LABRUM: Tearing at the posterosuperior labrum.       GLENOHUMERAL JOINT: Inferior glenohumeral ligament appears intact.  No   sizable glenohumeral joint effusion.  Mild glenohumeral chondromalacia.       AC JOINT AND ACROMIOCLAVICULAR ARCH: Mild degenerative changes of the right   AC joint.  Type 2 acromion.       BONE MARROW: Subcortical cystic changes at the lateral humeral head.  Bone   marrow signal intensity within the visualized osseous structures is within   normal limits.  No acute fracture or dislocation involving the osseous   components of the shoulder.       OUTLET SPACES: Suprascapular notch and quadrilateral space grossly   unremarkable in appearance.       No right axillary lymphadenopathy.           Impression   1. Shallow partial-thickness articular-surface tearing of supraspinatus   between critical zone and footplate.  Mild-to-moderate supraspinatus   tendinosis. 2. Mild infraspinatus tendinopathy. 3. Tearing at the posterosuperior labrum. 4. Mild glenohumeral chondromalacia. 5. Mild degenerative change of the right AC joint.         EXAM: XR SHOULDER RIGHT MINIMUM 2-VIEWS   EXAM: XR KNEE RIGHT 1 OR 2-VIEWS        INDICATION: Pain,         COMPARISON: Knee radiograph dated 4/23/2021.       TECHNIQUE: 3 views of the right shoulder, 2 views of the right knee       FINDINGS:       RIGHT SHOULDER   No acute fracture or dislocation. Mild AC joint arthrosis. The glenohumeral and AC joints are    otherwise maintained. Soft tissues are unremarkable.       RIGHT KNEE   No acute fracture. Mild medial compartment joint space narrowing. The knee joint is otherwise    maintained. Small quadriceps insertional enthesophytes along the patella. No sizable knee joint    effusion.       IMPRESSION:       RIGHT SHOULDER   No acute osseous abnormality.       RIGHT KNEE   No acute osseous abnormality.       Report Verified by: Renetta Walsh DO at 5/6/2021 9:15 PM EDT                   Assessment   Impression: . Encounter Diagnoses   Name Primary?  Nontraumatic incomplete tear of right rotator cuff     Tendinopathy of rotator cuff, right     Neck pain               Plan:     Postinjection protocol-right shoulder  Activity modification rotator cuff protocol  Continue Naprosyn twice daily as needed with GI precaution  Clinical follow-up in 3 weeks  Consider limited ultrasound evaluation.   Corroborate findings with respect to pathology supraspinatus tendon evident on MRI  Consider MRI reread right shoulder ProScan imaging. Overall I believe the primary pain generator relates to the rotator cuff and I do not believe her pain is of radicular etiology at this time or that a significant component of pain is of radicular etiology. Low clinical suspicion for adhesive capsulitis at this time. There are no upper motor neuron findings to suggest active cervical myelopathy at this time. Proceed as outlined above consider further management options related to cervical spine pending response to the aforementioned treatment plan. Approximately  45minutes was spent related to previewing pertinent medical documentation prior to the patient's visit along with counseling during the patient's visit with respect to treatment options inclusive of alternatives to treatment and the complications and risks related to those treatment options along with expectations of outcome related to those treatments and inclusive of time in the documentation and ordering of testing and treatment after the visit. The nature of the finding, probable diagnosis and likely treatment was thoroughly discussed with the patient and adult child. The options, risks, complications, alternative treatment as well as some of the differential diagnosis was discussed. The patient was thoroughly informed and all questions were answered. the patient indicated understanding and satisfaction with the discussion. Orders:      No orders of the defined types were placed in this encounter. Disclaimer: \"This note was dictated with voice recognition software. Though review and correction are routine, we apologize for any errors. \"

## 2022-01-12 ENCOUNTER — TELEPHONE (OUTPATIENT)
Dept: FAMILY MEDICINE CLINIC | Age: 62
End: 2022-01-12

## 2022-01-12 ENCOUNTER — OFFICE VISIT (OUTPATIENT)
Dept: ORTHOPEDIC SURGERY | Age: 62
End: 2022-01-12
Payer: MEDICAID

## 2022-01-12 VITALS — HEIGHT: 60 IN | WEIGHT: 169 LBS | BODY MASS INDEX: 33.18 KG/M2

## 2022-01-12 DIAGNOSIS — M17.11 PRIMARY OSTEOARTHRITIS OF RIGHT KNEE: ICD-10-CM

## 2022-01-12 DIAGNOSIS — M25.561 RIGHT KNEE PAIN, UNSPECIFIED CHRONICITY: ICD-10-CM

## 2022-01-12 DIAGNOSIS — M21.161 ACQUIRED VARUS DEFORMITY KNEE, RIGHT: ICD-10-CM

## 2022-01-12 LAB
AVERAGE GLUCOSE: NORMAL
HBA1C MFR BLD: 6.3 %

## 2022-01-12 PROCEDURE — 20611 DRAIN/INJ JOINT/BURSA W/US: CPT | Performed by: INTERNAL MEDICINE

## 2022-01-12 PROCEDURE — L1810 KO ELASTIC WITH JOINTS: HCPCS | Performed by: INTERNAL MEDICINE

## 2022-01-12 PROCEDURE — 99214 OFFICE O/P EST MOD 30 MIN: CPT | Performed by: INTERNAL MEDICINE

## 2022-01-12 NOTE — TELEPHONE ENCOUNTER
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  [place] [facility] [phone], fax [phone]

## 2022-01-12 NOTE — PROGRESS NOTES
Chief Complaint:   Chief Complaint   Patient presents with    Knee Pain     Rt knee pain x 2 yrs. Has had an injection in the past that helped a lot. Pain 5-6/10          History of Present Illness:       Patient is a 62 y.o. female presents with the above complaint. She is seen in consultation at the request of Dr. America Ramirez. The symptoms began 1 yearsago and started without an injury. The patient describes a sharp, aching pain that does not radiate. The symptoms are intermittent  and are are worsening since the onset. Pain localizes to the medial side of the knee and  does not seems to follow a typical patella femoral provacative pattern. There are not mechanical symptoms that suggest meniscal injury. The patient denies subjective instability about the knee and denies new onset weakness of the lower extremity. Pain level 7    The patient admits to infrequent  activity related swelling. Treatment to date: Cortisone injection April 2021 with good improvement. There is no prior history of knee trauma. There is no prior history of autoimmune disease, inflammatory arthropathy, or crystal arthropathy. Past Medical History:        Past Medical History:   Diagnosis Date    Arthritis     Asthma     Chronic bilateral thoracic back pain 6/13/2018    GERD (gastroesophageal reflux disease)     Hypothyroidism 7/21/2021    Intractable chronic migraine without aura and without status migrainosus 7/29/2016    Primary osteoarthritis of both hands 10/20/2015    Thyroid disease          Past Surgical History:   Procedure Laterality Date    HEMORRHOIDECTOMY N/A 9/23/2019    HEMORRHOIDECTOMY performed by Moiz Currie MD at [facility]         Present Medications:         Current Outpatient Medications   Medication Sig Dispense Refill    gabapentin (NEURONTIN) 100 MG capsule Take 300 mg by mouth nightly.        methyl salicylate-menthol (MICHOACANO OLIVARES GREASELESS) 10-15 % CREA Apply topically 3 times daily as needed for Pain 30 g 3    traZODone (DESYREL) 50 MG tablet TAKE 1 TABLET BY MOUTH EVERY NIGHT 90 tablet 3    omeprazole (PRILOSEC) 20 MG delayed release capsule TAKE ONE CAPSULE BY MOUTH DAILY 90 capsule 2    levothyroxine (SYNTHROID) 50 MCG tablet Take 1 tablet by mouth daily 90 tablet 5    albuterol sulfate HFA (PROVENTIL HFA) 108 (90 Base) MCG/ACT inhaler Inhale 2 puffs into the lungs every 6 hours as needed for Wheezing or Shortness of Breath (and/or persistent cough) 1 each 3    acetaminophen (APAP EXTRA STRENGTH) 500 MG tablet Take 1 tablet by mouth every 6 hours as needed for Pain 120 tablet 3     No current facility-administered medications for this visit. Allergies:      No Known Allergies     Social History:         Social History     Socioeconomic History    Marital status:      Spouse name: Not on file    Number of children: Not on file    Years of education: Not on file    Highest education level: Not on file   Occupational History    Not on file   Tobacco Use    Smoking status: Never Smoker    Smokeless tobacco: Never Used   Substance and Sexual Activity    Alcohol use: No     Alcohol/week: 0.0 standard drinks    Drug use: No    Sexual activity: Never   Other Topics Concern    Not on file   Social History Narrative    Not on file     Social Determinants of Health     Financial Resource Strain: Low Risk     Difficulty of Paying Living Expenses: Not hard at all   Food Insecurity: No Food Insecurity    Worried About [facility] in the Last Year: Never true    [facility] in the Last Year: Never true   Transportation Needs:     Lack of Transportation (Medical): Not on file    Lack of Transportation (Non-Medical):  Not on file   Physical Activity:     Days of Exercise per Week: Not on file    Minutes of Exercise per Session: Not on file   Stress:     Feeling of Stress : Not on file   Social Connections:     Frequency of Communication with Friends and Family: Not on file    Frequency of Social Gatherings with Friends and Family: Not on file    Attends Congregational Services: Not on file    Active Member of Clubs or Organizations: Not on file    Attends Club or Organization Meetings: Not on file    Marital Status: Not on file   Intimate Partner Violence:     Fear of Current or Ex-Partner: Not on file    Emotionally Abused: Not on file    Physically Abused: Not on file    Sexually Abused: Not on file   Housing Stability:     Unable to Pay for Housing in the Last Year: Not on file    Number of [place] in the Last Year: Not on file    Unstable Housing in the Last Year: Not on file        Review of Symptoms:    Pertinent items are noted in HPI    Review of systems reviewed from Patient History Form dated on today's date and   available in the patient's chart under the Media tab. Vital Signs: There were no vitals filed for this visit. General Exam:     Constitutional: Patient is adequately groomed with no evidence of malnutrition  Mental Status: The patient is oriented to time, place and person. The patient's mood and affect are appropriate. Vascular: Examination reveals no swelling or calf tenderness. Peripheral pulses are palpable and 2+. Lymphatics: no lymphadenopathy of the inguinal region or lower extremity      Physical Exam: right knee      Primary Exam:    Inspection: No deformity atrophy appreciable curvature      Palpation: Mild tenderness over Emgel      Range of Motion: 0/130 low-grade discomfort with mid range flexion      Strength: Normal lower extremity      Special Tests:   Lachman test negative, grade 1 subtle varus instability, knee stable to valgus stressing, anterior/posterior drawer negative, mild pain with medial Khadijah stressing, patella femoral provacative Negative      Skin: There are no rashes, ulcerations or lesions.       Gait: Nonantalgic      Reflex intact lower     Additional Comments:        Additional Examinations:           Right Lower Extremity: Examination of the right lower extremity does not show any tenderness, deformity or injury. Range of motion is unremarkable. There is no gross instability. There are no rashes, ulcerations or lesions. Strength and tone are normal.   Neurolgic -Light touch sensation and manual muscle testing normal L2-S1. No fasiculations. Pattella tendon and Achilles tendon reflexes +2 bilaterally. Seated SLR negative        Office Imaging Results/Procedures PerformedToday:      Radiology:     X-rays obtained and reviewed in office:   Views 4 views left knee comparison view standing/rociot/barbierg right knee   Location left knee   Impression [place] view demonstrates grade 3 medial compartment degenerative change which is asymmetric. Standard AP demonstrates grade 2 degenerative change affecting the contralateral right knee and asymmetric narrowing of the medial compartment on the left consistent with grade 3 degenerative change. Lateral projection demonstrates patellofemoral joint is anatomic no other soft tissue or osseous abnormalities       Office Procedures:     Orders Placed This Encounter   Procedures    XR KNEE RIGHT (MIN 4 VIEWS)     Standing Status:   Future     Number of Occurrences:   1     Standing Expiration Date:   1/11/2023     Order Specific Question:   Reason for exam:     Answer:   pain    XR KNEE LEFT (3 VIEWS)     Standing Status:   Future     Number of Occurrences:   1     Standing Expiration Date:   1/12/2023     Order Specific Question:   Reason for exam:     Answer:   pain    US GUIDED NEEDLE PLACEMENT     Standing Status:   Future     Number of Occurrences:   1     Standing Expiration Date:   1/12/2023    94286 - [place] DRAIN/INJECT LARGE JOINT/BURSA    Breg / DJO Economy Hinged Knee Brace     Patient was prescribed an Economy Hinged Knee Brace.  The right knee will require stabilization / immobilization from this semi-rigid / rigid orthosis to improve their function. The orthosis will assist in protecting the affected area, provide functional support and facilitate healing. The patient was educated and fit by a healthcare professional with expert knowledge and specialization in brace application while under the direct supervision of the treating physician. Verbal and written instructions for the use of and application of this item were provided. They were instructed to contact the office immediately should the brace result in increased pain, decreased sensation, increased swelling or worsening of the condition. Logic E Ultrasound/ 10 HZ    The patient was placed supine on the examination table with the knees supported. The        Lower extremity was slightly abducted . The ultrasound was placed on knee preset function and the linear transducer was placed transversely  over the medial patellofemoral joint and the medial patella femoral  joint recess was identified. The skin was prepped in sterile fashion. Sterile ultrasound gel  and topical anesthetic were utilized. Using axial technique, a 22 GA 40 mm needle was advanced under direct guidance into the medial patellofemoral joint recess and 3 mL of Durolane was injected . The joint space was visualized distending with Injectate. There was no resistance to the Injectate. Patient tolerated this with minimal to no discomfort. Band-Aid applied to puncture wound. Technically successful ultrasound guided injection    Image stored    The media patellafemoral joint recess was visualized in short axis. No evidence of effusion, soft tissue mass or cystic lesions. Other Outside Imaging and Testing Personally Reviewed:    US GUIDED NEEDLE PLACEMENT    Result Date: 1/10/2022  Radiology result is complete; follow up with provider / physician office for radiology results              Assessment   Impression: . Encounter Diagnoses   Name Primary?     Primary osteoarthritis of right knee     Acquired varus deformity knee, right     Right knee pain, unspecified chronicity               Plan:   Postinjection protocol and trial of functional knee bracing with ADLs as needed  Consider OA  bracing trial.  Consider adjunct treatment with biologic orthopedic injection-PRP-PRGF-ENDORET. Home health care for knee and right shoulder as she does not have transportation to attend outpatient services  Premature to consider her a candidate for T KA at this time    The nature of the finding, probable diagnosis and likely treatment was thoroughly discussed with the patient. The options, risks, complications, alternative treatment as well as some of the differential diagnosis was discussed. The patient was thoroughly informed and all questions were answered. the patient indicated understanding and satisfaction with the discussion. Orders:        Orders Placed This Encounter   Procedures    XR KNEE RIGHT (MIN 4 VIEWS)     Standing Status:   Future     Number of Occurrences:   1     Standing Expiration Date:   1/11/2023     Order Specific Question:   Reason for exam:     Answer:   pain    XR KNEE LEFT (3 VIEWS)     Standing Status:   Future     Number of Occurrences:   1     Standing Expiration Date:   1/12/2023     Order Specific Question:   Reason for exam:     Answer:   pain    US GUIDED NEEDLE PLACEMENT     Standing Status:   Future     Number of Occurrences:   1     Standing Expiration Date:   1/12/2023    87039 - [place] DRAIN/INJECT LARGE JOINT/BURSA    Breg / DJO Economy Hinged Knee Brace     Patient was prescribed an Economy Hinged Knee Brace. The right knee will require stabilization / immobilization from this semi-rigid / rigid orthosis to improve their function. The orthosis will assist in protecting the affected area, provide functional support and facilitate healing.     The patient was educated and fit by a healthcare professional with expert knowledge and specialization in brace application while under the direct supervision of the treating physician. Verbal and written instructions for the use of and application of this item were provided. They were instructed to contact the office immediately should the brace result in increased pain, decreased sensation, increased swelling or worsening of the condition. Disclaimer: \"This note was dictated with voice recognition software. Though review and correction are routine, we apologize for any errors. \"

## 2022-01-13 ENCOUNTER — OFFICE VISIT (OUTPATIENT)
Dept: RHEUMATOLOGY | Age: 62
End: 2022-01-13
Payer: MEDICAID

## 2022-01-13 VITALS
BODY MASS INDEX: 33.18 KG/M2 | DIASTOLIC BLOOD PRESSURE: 76 MMHG | WEIGHT: 169 LBS | HEIGHT: 60 IN | SYSTOLIC BLOOD PRESSURE: 120 MMHG

## 2022-01-13 DIAGNOSIS — G89.29 CHRONIC RIGHT SHOULDER PAIN: Primary | ICD-10-CM

## 2022-01-13 DIAGNOSIS — M25.511 CHRONIC RIGHT SHOULDER PAIN: Primary | ICD-10-CM

## 2022-01-13 DIAGNOSIS — M15.9 PRIMARY OSTEOARTHRITIS INVOLVING MULTIPLE JOINTS: ICD-10-CM

## 2022-01-13 PROCEDURE — 99213 OFFICE O/P EST LOW 20 MIN: CPT | Performed by: INTERNAL MEDICINE

## 2022-01-13 NOTE — PROGRESS NOTES
[address], [place]                                                           [address] [place] [address] [phone] (P) [phone] (F)      Note is transcribed using voice recognition software. Inadvertent computerized transcription errors may be present. Patient identification: Michael Shah female : 1960,61 y.o. [id number] for noninflammatory musculoskeletal discomfort from degenerative arthritis in her knees, tendinitis right shoulder, and left paraspinal neck discogenic pain. States that she was evaluated by orthopedics, had intra-articular steroid injections in her knees and right shoulder, doing much better today. Range of motion is still limited in her right shoulder. Continues to have intercurrent discomfort in her left paraspinal and trapezius area. Describes burning sensation all over-not in dermatomal or explained by anatomical distribution. States that burning pain moves around in her face, rib cage, abdomen. she does not have any tender swollen inflamed joints in upper or lower extremities. ADLs are manageable. PMH, PSH,Social history , Meds reviewed. FH-no family history of autoimmune rheumatic disorders. PHYSICAL EXAM:    Vitals:    /76   Ht 5' (1.524 m)   Wt 169 lb (76.7 kg)   BMI 33.01 kg/m²   AA)x3 well nourished, and well groomed, normal judgement. MKS:   Right shoulder-approximately 70% limitation in range of motion in all planes active and passive. Knees bony swelling, bony crepitus bilaterally, mild valgus deformity. No effusion. Full flexion extension. Using cane for ambulation.   No appreciable tender or inflamed joints in upper or lower extremities otherwise. Skin: No rashes, no induration or skin thickening or nodules. DATA:       ASSESSMENT AND PLAN:   Diagnosis Orders   1. Chronic right shoulder pain     2. Primary osteoarthritis involving multiple joints         Noninflammatory musculoskeletal discomfort-doing fairly well at this time after intra-articular injections-given orthopedics. We talked about utilizing diclofenac gel as needed    I also taught her shoulder exercises as well as knee exercises. Recommend following up with orthopedics knee osteoarthritis. #################################################################################################  Patient indicates understanding and agrees with the management plan. Total time 25 minutes that includes the following-  Preparing to see the patient such as reviewing patients records, pre-charting, preparing the visit on the same day, performing a medically appropriate history and physical examination, counseling and educating patient about diagnosis, management plan, ordering appropriate testings, prescriptions, communicating findings to other care providers, and documenting clinical information in electronic medical record. I thank you for giving me the opportunity to be involved in [facility] care and I look forward following Makayla along with you. If you have any questions or concerns please feel free to contact me at any time.   Leandra Loredo MD 01/13/22

## 2022-01-14 ENCOUNTER — TELEPHONE (OUTPATIENT)
Dept: FAMILY MEDICINE CLINIC | Age: 62
End: 2022-01-14

## 2022-01-14 NOTE — TELEPHONE ENCOUNTER
----- Message from Conchita Melo sent at 1/14/2022 12:48 PM EST -----  Subject: Message to Provider    QUESTIONS  Information for Provider? Danielle stated that he received a fax stating that   service is required SN pt and OT they also provide a service [facility]. Danielle   stated that that the the office needs to [place] [facility] and get rid of the   PT/OT. Macjordon would like a call call back at [phone] and his fax is   [phone]. Danielle stated that he called the office on 1/12/22 and has   not heard back. ---------------------------------------------------------------------------  --------------  Cherylenoch Garcia INFO  What is the best way for the office to contact you? OK to leave message on   voicemail  Preferred Call Back Phone Number? [phone]  ---------------------------------------------------------------------------  --------------  SCRIPT ANSWERS  Relationship to Patient? Third Party  Representative Name?  [facility].

## 2022-02-01 ENCOUNTER — OFFICE VISIT (OUTPATIENT)
Dept: ORTHOPEDIC SURGERY | Age: 62
End: 2022-02-01
Payer: MEDICAID

## 2022-02-01 VITALS — HEIGHT: 60 IN | WEIGHT: 169.09 LBS | BODY MASS INDEX: 33.2 KG/M2

## 2022-02-01 DIAGNOSIS — M67.911 TENDINOPATHY OF ROTATOR CUFF, RIGHT: ICD-10-CM

## 2022-02-01 DIAGNOSIS — M15.1 HEBERDEN'S NODES OF BOTH HANDS: ICD-10-CM

## 2022-02-01 DIAGNOSIS — M75.111 NONTRAUMATIC INCOMPLETE TEAR OF RIGHT ROTATOR CUFF: Primary | ICD-10-CM

## 2022-02-01 PROCEDURE — 99214 OFFICE O/P EST MOD 30 MIN: CPT | Performed by: INTERNAL MEDICINE

## 2022-02-01 RX ORDER — MELOXICAM 15 MG/1
15 TABLET ORAL DAILY PRN
Qty: 30 TABLET | Refills: 2 | Status: SHIPPED | OUTPATIENT
Start: 2022-02-01 | End: 2022-04-06 | Stop reason: SDUPTHER

## 2022-02-01 NOTE — PROGRESS NOTES
Chief Complaint:   Chief Complaint   Patient presents with    Shoulder Pain     RT shoulder pain. Patient is out of naprosyn, but did have relief. Currently has relief w/ ibuprofen. Pain 4-5/10. History of Present Illness:       Patient is a 62 y.o. female returns follow up for the above complaint. The patient was last seen approximately 3 weeksago. The symptoms are improving since the last visit. The patient has had no further testing for the problem.  was present to facilitate today's office visit    Shoulder is 60% improved overall and she has completed 2 sessions of PT however she reports that her home physical therapist has already transitioned her to I HEP which seems premature. Patient continues to have difficulty with overhead activities primarily in her symptoms follow typical rotator cuff provocative pattern    She continues with intermittent use of Motrin without side effect.     There is no correlation of pain with neck range of motion she denies any neuritic character of pain localizing to the shoulder    She denies any mechanical symptoms or subjective instability pain levels 4-5/10         Past Medical History:        Past Medical History:   Diagnosis Date    Arthritis     Asthma     Chronic bilateral thoracic back pain 6/13/2018    GERD (gastroesophageal reflux disease)     Hypothyroidism 7/21/2021    Intractable chronic migraine without aura and without status migrainosus 7/29/2016    Primary osteoarthritis of both hands 10/20/2015    Thyroid disease         Present Medications:         Current Outpatient Medications   Medication Sig Dispense Refill    diclofenac sodium (VOLTAREN) 1 % GEL Apply 2 to 4 g 3 times daily for 2 weeks then twice daily as needed thereafter 150 g 3    meloxicam (MOBIC) 15 MG tablet Take 1 tablet by mouth daily as needed for Pain 30 tablet 2    diclofenac sodium (VOLTAREN) 1 % GEL Apply 2 g topically 3 times daily as needed for Pain 350 g 1    gabapentin (NEURONTIN) 100 MG capsule Take 300 mg by mouth nightly.  methyl salicylate-menthol (MICHOACANO OLIVARES GREASELESS) 10-15 % CREA Apply topically 3 times daily as needed for Pain 30 g 3    traZODone (DESYREL) 50 MG tablet TAKE 1 TABLET BY MOUTH EVERY NIGHT 90 tablet 3    omeprazole (PRILOSEC) 20 MG delayed release capsule TAKE ONE CAPSULE BY MOUTH DAILY 90 capsule 2    levothyroxine (SYNTHROID) 50 MCG tablet Take 1 tablet by mouth daily 90 tablet 5    albuterol sulfate HFA (PROVENTIL HFA) 108 (90 Base) MCG/ACT inhaler Inhale 2 puffs into the lungs every 6 hours as needed for Wheezing or Shortness of Breath (and/or persistent cough) 1 each 3    acetaminophen (APAP EXTRA STRENGTH) 500 MG tablet Take 1 tablet by mouth every 6 hours as needed for Pain 120 tablet 3     No current facility-administered medications for this visit. Allergies:      No Known Allergies        Review of Systems:    Pertinent items are noted in HPI      Vital Signs: There were no vitals filed for this visit. General Exam:     Constitutional: Patient is adequately groomed with no evidence of malnutrition    Physical Exam: right shoulder      Primary Exam:    Inspection: No deformity atrophy appreciable curvature      Palpation: No focal tenderness      Range of Motion: 90 degrees forward elevation active, passive 155 degrees with pain, abduction 110 degrees active, 150 degrees passive with pain, internal rotation L4/L5 active associate with pain      Strength: Mildly diminished with pain isolated abduction      Special Tests: Impingement sign suggestive      Skin: There are no rashes, ulcerations or lesions. Gait: Cane assisted     Neurovascular - non focal and intact       Additional Comments:        Additional Examinations:           Neck: Examination of the neck does not show any tenderness, deformity or injury. Range of motion mild globally restricted. There is no gross instability.          Office Imaging Results/Procedures PerformedToday:            Office Procedures:   No orders of the defined types were placed in this encounter. Other Outside Imaging and Testing Personally Reviewed:      Results for Kye Gold (MRN [id number]) as of 2/1/2022 10:31   Ref. Range 2/24/2021 08:33   Sodium Latest Ref Range: 136 - 145 mmol/L 136   Potassium Latest Ref Range: 3.5 - 5.1 mmol/L 4.2   Chloride Latest Ref Range: 99 - 110 mmol/L 103   CO2 Latest Ref Range: 21 - 32 mmol/L 24   BUN Latest Ref Range: 7 - 20 mg/dL 13   Creatinine Latest Ref Range: 0.6 - 1.1 mg/dL 0.6   Anion Gap Latest Ref Range: 3 - 16  9   GFR Non- Latest Ref Range: >60  >60   GFR  Latest Ref Range: >60  >60   Glucose Latest Ref Range: 70 - 99 mg/dL 98   CALCIUM, SERUM, [id number] Latest Ref Range: 8.3 - 10.6 mg/dL 9.5           Assessment   Impression: . Encounter Diagnoses   Name Primary?  Nontraumatic incomplete tear of right rotator cuff Yes    Tendinopathy of rotator cuff, right     Heberden's nodes of both hands               Plan: Activity modification rotator cuff protocol  Recommend advancement of her home physical therapy-we will contact home health care services to address her concern for additional visits sensory to optimize the patient's shoulder function  Continue/progress current HEP  Discontinue Naprosyn for meloxicam with GI precaution  Repeat ultrasound-guided subacromial injection only for escalating pain levels  Local measures with respect to osteoarthritis of the DIP joints inclusive of Voltaren gel and Coban strapping  Clinical follow-up of left knee on follow-up       Overall I believe the primary pain generator relates to the rotator cuff and I do not believe her pain is of radicular etiology at this time or that a significant component of pain is of radicular etiology.   Low clinical suspicion for adhesive capsulitis at this time.     There are no upper motor neuron findings to suggest active cervical myelopathy at this time. Proceed as outlined above consider further management options related to cervical spine pending response to the aforementioned treatment plan.         Approximately 30  minutes was spent related to previewing pertinent medical documentation prior to the patient's visit along with counseling during the patient's visit with respect to treatment options inclusive of alternatives to treatment and the complications and risks related to those treatment options along with expectations of outcome related to those treatments and inclusive of time in the documentation and ordering of testing and treatment after the visit. Orders:      No orders of the defined types were placed in this encounter. Kacy Rosen MD.      Disclaimer: \"This note was dictated with voice recognition software. Though review and correction are routine, we apologize for any errors. \"

## 2022-02-08 ENCOUNTER — TELEPHONE (OUTPATIENT)
Dept: ORTHOPEDIC SURGERY | Age: 62
End: 2022-02-08

## 2022-02-08 NOTE — TELEPHONE ENCOUNTER
Medical Facility Question     Facility Name: [address] [place] [address]  Contact Name: Sage Sepulveda  Contact Number: [phone]  Request or Information: HOME CARE IS CONCERNED THEY ARE NOT HELPING HER.  SHE HAS MISSED 2 OUT 5 VISITS. EITHER NOT HOME OR CANCELLED APPOINTMENTS. DIFFICULTY SCHEDULING.  VERY LIMITED PARTICIPATION WITH ACTIVITIES. MIGHT SHE BETTER BE SERVED WITH OUTPATIENT. THOUGHT WAS TO DISCHARGE POSSIBLY TO OUTPATIENT. PLEASE ADVISE.

## 2022-02-08 NOTE — TELEPHONE ENCOUNTER
Spoke to NIDIA Sorto [address] to relay the message that Dr Leann Duarte would like continued home PT for the shoulder to advance function/strengthening per last OV note. PT can cont 1-2x/wk for the next 4 wks, and send communications prn.

## 2022-02-09 NOTE — TELEPHONE ENCOUNTER
Transportation is the main hindrance for her, therefore home PT is optimal.    Proceed w/ scheduling more home sessions target of at least 4-6 sessions to advance her shoulder program.    Thanks

## 2022-04-06 ENCOUNTER — OFFICE VISIT (OUTPATIENT)
Dept: ORTHOPEDIC SURGERY | Age: 62
End: 2022-04-06
Payer: MEDICAID

## 2022-04-06 VITALS — BODY MASS INDEX: 33.2 KG/M2 | WEIGHT: 169.09 LBS | HEIGHT: 60 IN

## 2022-04-06 DIAGNOSIS — M75.111 NONTRAUMATIC INCOMPLETE TEAR OF RIGHT ROTATOR CUFF: Primary | ICD-10-CM

## 2022-04-06 DIAGNOSIS — M15.1 HEBERDEN'S NODES OF BOTH HANDS: ICD-10-CM

## 2022-04-06 DIAGNOSIS — M21.161 ACQUIRED VARUS DEFORMITY KNEE, RIGHT: ICD-10-CM

## 2022-04-06 DIAGNOSIS — M17.11 PRIMARY OSTEOARTHRITIS OF RIGHT KNEE: ICD-10-CM

## 2022-04-06 DIAGNOSIS — M67.911 TENDINOPATHY OF ROTATOR CUFF, RIGHT: ICD-10-CM

## 2022-04-06 PROCEDURE — 99214 OFFICE O/P EST MOD 30 MIN: CPT | Performed by: INTERNAL MEDICINE

## 2022-04-06 RX ORDER — MELOXICAM 15 MG/1
15 TABLET ORAL DAILY PRN
Qty: 90 TABLET | Refills: 1 | Status: SHIPPED | OUTPATIENT
Start: 2022-04-06 | End: 2022-08-16 | Stop reason: SDUPTHER

## 2022-04-06 RX ORDER — TIZANIDINE 4 MG/1
4 TABLET ORAL 2 TIMES DAILY PRN
Qty: 30 TABLET | Refills: 1 | Status: SHIPPED | OUTPATIENT
Start: 2022-04-06 | End: 2022-08-16 | Stop reason: SDUPTHER

## 2022-04-06 NOTE — PROGRESS NOTES
Chief Complaint:   Chief Complaint   Patient presents with    Shoulder Pain     right, overall better, still have pain but it is less severe, medications help    Knee Pain     right, some improvement since Durolane injection, still needing medication and topical cream for pain, forgot cane today, so having difficulty walking without it, but pt is happy to be able to walk better than before          History of Present Illness:       Patient is a 62 y.o. female returns follow up for the above complaint. The patient was last seen approximately 2 monthsago. The symptoms overall improved since the last visit with respect to the right shoulder however the shoulder remains problematic with overhead activities especially. The patient has had no further testing for the problem. She has completed home PT in the interim and transition to [facility]     present to facilitate today's visit    The symptoms of shoulder pain continue to follow rotator cuff provocative pattern.   She denies any pain at rest pain levels moderate with overhead activities    With respect to the right knee she has noted therapeutic benefit from the HA injection administered January 2021    She continues to localize pain to the medial compartment when symptomatic    No pattern of active related swelling of significance     Past Medical History:        Past Medical History:   Diagnosis Date    Arthritis     Asthma     Chronic bilateral thoracic back pain 6/13/2018    GERD (gastroesophageal reflux disease)     Hypothyroidism 7/21/2021    Intractable chronic migraine without aura and without status migrainosus 7/29/2016    Primary osteoarthritis of both hands 10/20/2015    Thyroid disease         Present Medications:         Current Outpatient Medications   Medication Sig Dispense Refill    meloxicam (MOBIC) 15 MG tablet Take 1 tablet by mouth daily as needed for Pain 90 tablet 1    tiZANidine (ZANAFLEX) 4 MG tablet Take 1 tablet by mouth 2 times daily as needed (Muscle tightness/spasm) 30 tablet 1    diclofenac sodium (VOLTAREN) 1 % GEL Apply 2 to 4 g 3 times daily for 2 weeks then twice daily as needed thereafter 150 g 3    diclofenac sodium (VOLTAREN) 1 % GEL Apply 2 g topically 3 times daily as needed for Pain 350 g 1    gabapentin (NEURONTIN) 100 MG capsule Take 300 mg by mouth nightly.  methyl salicylate-menthol (MICHOACANO OLIVARES GREASELESS) 10-15 % CREA Apply topically 3 times daily as needed for Pain 30 g 3    traZODone (DESYREL) 50 MG tablet TAKE 1 TABLET BY MOUTH EVERY NIGHT 90 tablet 3    omeprazole (PRILOSEC) 20 MG delayed release capsule TAKE ONE CAPSULE BY MOUTH DAILY 90 capsule 2    levothyroxine (SYNTHROID) 50 MCG tablet Take 1 tablet by mouth daily 90 tablet 5    albuterol sulfate HFA (PROVENTIL HFA) 108 (90 Base) MCG/ACT inhaler Inhale 2 puffs into the lungs every 6 hours as needed for Wheezing or Shortness of Breath (and/or persistent cough) 1 each 3    acetaminophen (APAP EXTRA STRENGTH) 500 MG tablet Take 1 tablet by mouth every 6 hours as needed for Pain 120 tablet 3     No current facility-administered medications for this visit. Allergies:      No Known Allergies        Review of Systems:    Pertinent items are noted in HPI      Vital Signs: There were no vitals filed for this visit. General Exam:     Constitutional: Patient is adequately groomed with no evidence of malnutrition    Physical Exam: right knee      Primary Exam:    Inspection: No appreciable effusion      Palpation: Mild tenderness of gaol      Range of Motion: Full range in extension flexion to 120 degrees      Strength: Normal with SLR      Skin: There are no rashes, ulcerations or lesions.       Gait: Minimally antalgic     Neurovascular - non focal and intact       Additional Comments:        Additional Examinations:          Right shoulder:    No deformity atrophy appreciable effusion  Range of motion active 90/90/lumbosacral, to resisted 150/150 and L3/L4 pain overhead and in extremes  Strength mildly diminished with pain for elevation abduction otherwise normal  Special tests-impingement sign equivocal, negative drop arm sign      Office Imaging Results/Procedures PerformedToday:         Office Procedures:   No orders of the defined types were placed in this encounter.           Other Outside Imaging and Testing Personally Reviewed:    EXAMINATION:   MRI OF THE RIGHT SHOULDER WITHOUT CONTRAST   12/8/2021 1:30 pm       TECHNIQUE:   Multiplanar multisequence MRI of the right shoulder was performed without the   administration of intravenous contrast.       COMPARISON:   Right shoulder plain radiographs performed on 07/21/2015       HISTORY:   ORDERING SYSTEM PROVIDED HISTORY: Chronic right shoulder pain   TECHNOLOGIST PROVIDED HISTORY:   Reason for Exam:  PT STS NO KNOWN INJURY TO CERVICAL OR RIGHT SHOULDER       81-year-old female with chronic right shoulder pain       FINDINGS:   ROTATOR CUFF: Small amount of fluid in the subacromial subdeltoid bursa.       Shallow partial-thickness articular-surface tearing of supraspinatus between   critical zone and footplate.  Mild-to-moderate supraspinatus tendinosis.       Mild infraspinatus tendinosis.       Subscapularis and teres minor muscle/tendon appear grossly intact without   evidence of tearing.       No significant atrophy or fatty degeneration of the visualized rotator cuff   musculature.       BICEPS TENDON: Long head of the biceps tendon properly located in the   bicipital groove and seen extending to the biceps labral anchor.       LABRUM: Tearing at the posterosuperior labrum.       GLENOHUMERAL JOINT: Inferior glenohumeral ligament appears intact.  No   sizable glenohumeral joint effusion.  Mild glenohumeral chondromalacia.       AC JOINT AND ACROMIOCLAVICULAR ARCH: Mild degenerative changes of the right   AC joint.  Type 2 acromion.       BONE MARROW: Subcortical cystic changes at the lateral humeral head.  Bone   marrow signal intensity within the visualized osseous structures is within   normal limits.  No acute fracture or dislocation involving the osseous   components of the shoulder.       OUTLET SPACES: Suprascapular notch and quadrilateral space grossly   unremarkable in appearance.       No right axillary lymphadenopathy.           Impression   1. Shallow partial-thickness articular-surface tearing of supraspinatus   between critical zone and footplate.  Mild-to-moderate supraspinatus   tendinosis. 2. Mild infraspinatus tendinopathy. 3. Tearing at the posterosuperior labrum. 4. Mild glenohumeral chondromalacia. 5. Mild degenerative change of the right [facility]               Assessment   Impression: . Encounter Diagnoses   Name Primary?     Nontraumatic incomplete tear of right rotator cuff Yes    Tendinopathy of rotator cuff, right     Primary osteoarthritis of right knee     Acquired varus deformity knee, right     Heberden's nodes of both hands               Plan:       Limited ultrasound evaluation of the shoulder on follow-up as needed-supraspinatus tendon  Activity modification rotator cuff protocol and restart home PT as needed continue I HEP  Ultrasound-guided subacromial steroid injection only for escalating pain levels   Continue biannual hyaluronic acid therapy premature to consider her candidate for TKA  Continue maintenance HEP-right knee  Biologic orthopedic injection [id number] could be an option to consider as an adjunct for the right knee osteoarthritis or as treatment option for the right shoulder subacromial and intratendinous  She is not interested in proceeding with a surgical solution/option to management of her right shoulder condition and understands that shoulder arthroscopy subacromial decompression and rotator cuff debridement could be a consideration    Approximately 30 minutes was spent related to previewing pertinent medical documentation prior to the patient's visit along with counseling during the patient's visit with respect to treatment options inclusive of alternatives to treatment and the complications and risks related to those treatment options along with expectations of outcome related to those treatments and inclusive of time in the documentation and ordering of testing and treatment after the visit. Orders:      No orders of the defined types were placed in this encounter. Cherri Garcia MD.      Disclaimer: \"This note was dictated with voice recognition software. Though review and correction are routine, we apologize for any errors. \"

## 2022-07-13 ENCOUNTER — OFFICE VISIT (OUTPATIENT)
Dept: ORTHOPEDIC SURGERY | Age: 62
End: 2022-07-13

## 2022-07-13 VITALS — BODY MASS INDEX: 33.2 KG/M2 | WEIGHT: 169.09 LBS | HEIGHT: 60 IN

## 2022-07-13 DIAGNOSIS — M21.161 ACQUIRED VARUS DEFORMITY KNEE, RIGHT: ICD-10-CM

## 2022-07-13 DIAGNOSIS — M17.11 PRIMARY OSTEOARTHRITIS OF RIGHT KNEE: Primary | ICD-10-CM

## 2022-07-13 PROCEDURE — 20611 DRAIN/INJ JOINT/BURSA W/US: CPT | Performed by: INTERNAL MEDICINE

## 2022-07-13 PROCEDURE — E0100 CANE ADJUST/FIXED WITH TIP: HCPCS | Performed by: INTERNAL MEDICINE

## 2022-07-13 PROCEDURE — L1810 KO ELASTIC WITH JOINTS: HCPCS | Performed by: INTERNAL MEDICINE

## 2022-07-13 RX ORDER — TRAMADOL HYDROCHLORIDE 50 MG/1
50 TABLET ORAL EVERY 8 HOURS PRN
Qty: 20 TABLET | Refills: 0 | Status: SHIPPED | OUTPATIENT
Start: 2022-07-13 | End: 2022-07-20

## 2022-07-13 NOTE — PROGRESS NOTES
Chief Complaint:   Chief Complaint   Patient presents with    Knee Pain     R knee is in more pain than it was before. Hurts to put weight on it or to do anything. Would like an HA injection today. History of Present Illness:       Patient is a 65 y.o. female returns follow up for the above complaint. The patient was last seen approximately 3 monthsago. The symptoms of right knee pain are worsening over the past 1 week unprompted by specific of injury or event. The patient has had further testing for the problem.  is present to facilitate today's communication    Pain levels 10/10    She would like to proceed with repeat HA injection if eligible    No pattern of active related swelling of significance she denies any new onset mechanical symptoms    She remains on meloxicam without side effect             Past Medical History:        Past Medical History:   Diagnosis Date    Arthritis     Asthma     Chronic bilateral thoracic back pain 6/13/2018    GERD (gastroesophageal reflux disease)     Hypothyroidism 7/21/2021    Intractable chronic migraine without aura and without status migrainosus 7/29/2016    Primary osteoarthritis of both hands 10/20/2015    Thyroid disease         Present Medications:         Current Outpatient Medications   Medication Sig Dispense Refill    traMADol (ULTRAM) 50 MG tablet Take 1 tablet by mouth every 8 hours as needed for Pain for up to 7 days. 20 tablet 0    meloxicam (MOBIC) 15 MG tablet Take 1 tablet by mouth daily as needed for Pain 90 tablet 1    tiZANidine (ZANAFLEX) 4 MG tablet Take 1 tablet by mouth 2 times daily as needed (Muscle tightness/spasm) 30 tablet 1    diclofenac sodium (VOLTAREN) 1 % GEL Apply 2 to 4 g 3 times daily for 2 weeks then twice daily as needed thereafter 150 g 3    diclofenac sodium (VOLTAREN) 1 % GEL Apply 2 g topically 3 times daily as needed for Pain 350 g 1    gabapentin (NEURONTIN) 100 MG capsule Take 300 mg by mouth nightly. methyl salicylate-menthol (MICHOACANO OLIVARES GREASELESS) 10-15 % CREA Apply topically 3 times daily as needed for Pain 30 g 3    traZODone (DESYREL) 50 MG tablet TAKE 1 TABLET BY MOUTH EVERY NIGHT 90 tablet 3    omeprazole (PRILOSEC) 20 MG delayed release capsule TAKE ONE CAPSULE BY MOUTH DAILY 90 capsule 2    levothyroxine (SYNTHROID) 50 MCG tablet Take 1 tablet by mouth daily 90 tablet 5    albuterol sulfate HFA (PROVENTIL HFA) 108 (90 Base) MCG/ACT inhaler Inhale 2 puffs into the lungs every 6 hours as needed for Wheezing or Shortness of Breath (and/or persistent cough) 1 each 3    acetaminophen (APAP EXTRA STRENGTH) 500 MG tablet Take 1 tablet by mouth every 6 hours as needed for Pain 120 tablet 3     No current facility-administered medications for this visit. Allergies:      No Known Allergies        Review of Systems:    Pertinent items are noted in HPI    . Vital Signs: There were no vitals filed for this visit. General Exam:     Constitutional: Patient is adequately groomed with no evidence of malnutrition    Physical Exam: right knee      Primary Exam:    Inspection: Static varus alignment      Palpation: Mild over the Emgel      Range of Motion: 0/115 involuntary guarding with active assisted knee flexion associate with pain      Strength: Diminished with SLR      Special Tests: Grade 1-2 subtle varus instability knee stable to valgus stressing Lachman test negative      Skin: There are no rashes, ulcerations or lesions.       Gait: Mild antalgic    Neurovascular - non focal and intact       Additional Comments:        Additional Examinations:               Office Imaging Results/Procedures PerformedToday:             Office Procedures:     Orders Placed This Encounter   Procedures    US GUIDED NEEDLE PLACEMENT     Standing Status:   Future     Number of Occurrences:   1     Standing Expiration Date:   7/13/2023     Order Specific Question:   Reason for exam:     Answer:   HA    ME ARTHROCENTESIS ASPIR&/INJ MAJOR JT/BURSA W/O US    Cane Medline     Patient was prescribed a [employer]. This mobility device is required for the following reasons:    Patient has mobility limitations that significantly impair their ability to participate in one or more mobility related activities of daily living. The patient is able to safely use the mobility device. Functional mobility deficit can be sufficiently resolved with the use of this device. Verbal and written instructions for the use of and application of this item were provided. The patient was educated and fit by a healthcare professional with expert knowledge and specialization in brace application while under the direct supervision of the treating physician. They were instructed to contact the office immediately should the equipment result in increased pain, decreased sensation, increased swelling or worsening of the condition. Breg / DJO Economy Hinged Knee Brace     Patient was prescribed an Economy Hinged Knee Brace. The right knee will require stabilization / immobilization from this semi-rigid / rigid orthosis to improve their function. The orthosis will assist in protecting the affected area, provide functional support and facilitate healing. The patient was educated and fit by a healthcare professional with expert knowledge and specialization in brace application while under the direct supervision of the treating physician. Verbal and written instructions for the use of and application of this item were provided. They were instructed to contact the office immediately should the brace result in increased pain, decreased sensation, increased swelling or worsening of the condition. Logic E Ultrasound/ 10 HZ    The patient was placed supine on the examination table with the knees supported. The   RT     Lower extremity was slightly abducted .  The ultrasound was placed on knee preset function and the linear transducer was GUIDED NEEDLE PLACEMENT     Standing Status:   Future     Number of Occurrences:   1     Standing Expiration Date:   7/13/2023     Order Specific Question:   Reason for exam:     Answer:   HA    [place] ARTHROCENTESIS ASPIR&/INJ MAJOR JT/BURSA W/O US    Cane Medline     Patient was prescribed a [employer]. This mobility device is required for the following reasons:    Patient has mobility limitations that significantly impair their ability to participate in one or more mobility related activities of daily living. The patient is able to safely use the mobility device. Functional mobility deficit can be sufficiently resolved with the use of this device. Verbal and written instructions for the use of and application of this item were provided. The patient was educated and fit by a healthcare professional with expert knowledge and specialization in brace application while under the direct supervision of the treating physician. They were instructed to contact the office immediately should the equipment result in increased pain, decreased sensation, increased swelling or worsening of the condition. Breg / DJO Economy Hinged Knee Brace     Patient was prescribed an Economy Hinged Knee Brace. The right knee will require stabilization / immobilization from this semi-rigid / rigid orthosis to improve their function. The orthosis will assist in protecting the affected area, provide functional support and facilitate healing. The patient was educated and fit by a healthcare professional with expert knowledge and specialization in brace application while under the direct supervision of the treating physician. Verbal and written instructions for the use of and application of this item were provided. They were instructed to contact the office immediately should the brace result in increased pain, decreased sensation, increased swelling or worsening of the condition.          Kristen Longoria MD.      Sheyla Public: \"This note was dictated with voice recognition software. Though review and correction are routine, we apologize for any errors. \"

## 2022-07-13 NOTE — LETTER
[address]  [address] [facility] [address]  Phone: [phone]  Fax: [phone]    Melina Cazares MD    July 18, 2022     Dave Chan  No address on file    Patient: Erich Nascimento   MR Number: [id number]   YOB: 1960   Date of Visit: 7/13/2022       Dear Dave Chan: Thank you for referring Erich Nascimento to me for evaluation/treatment. Below are the relevant portions of my assessment and plan of care. Impression: . Encounter Diagnoses   Name Primary?  Primary osteoarthritis of right knee Yes    Acquired varus deformity knee, right               Plan:     Postinjection protocol, continue meloxicam with GI precaution tramadol short-term as needed  Annual x-rays right knee consider MRI evaluation right knee as needed  Cane assisted weightbearing provided today and semirigid OA  bracing continue maintenance HEP  Recommend increased assistance with home health care aide  Consider her candidate for TKA as needed      Approximately 25 minutes was spent related to previewing pertinent medical documentation prior to the patient's visit along with counseling during the patient's visit with respect to treatment options inclusive of alternatives to treatment and the complications and risks related to those treatment options along with expectations of outcome related to those treatments and inclusive of time in the documentation and ordering of testing and treatment after the visit. Orders:        Orders Placed This Encounter   Procedures    US GUIDED NEEDLE PLACEMENT     Standing Status:   Future     Number of Occurrences:   1     Standing Expiration Date:   7/13/2023     Order Specific Question:   Reason for exam:     Answer:   HA    [place] ARTHROCENTESIS ASPIR&/INJ MAJOR JT/BURSA W/O US    DJO OA Reaction Knee Brace     Patient was prescribed a [employer] OA Reaction knee brace.    The right knee will require stabilization / immobilization from this semi-rigid / rigid orthosis to improve their function. The orthosis will assist in protecting the affected area, provide functional support and facilitate healing. The patient was educated and fit by a healthcare professional with expert knowledge and specialization in brace application while under the direct supervision of the physician. Verbal and written instructions for the use of and application of this item were provided. They were instructed to contact the office immediately should the brace result in increased pain, decreased sensation, increased swelling or worsening of the condition.  Cane Medline     Patient was prescribed a Medline Cane. This mobility device is required for the following reasons:    Patient has mobility limitations that significantly impair their ability to participate in one or more mobility related activities of daily living. The patient is able to safely use the mobility device. Functional mobility deficit can be sufficiently resolved with the use of this device. Verbal and written instructions for the use of and application of this item were provided. The patient was educated and fit by a healthcare professional with expert knowledge and specialization in brace application while under the direct supervision of the treating physician. They were instructed to contact the office immediately should the equipment result in increased pain, decreased sensation, increased swelling or worsening of the condition. Nito Mckenzie MD.      Disclaimer: \"This note was dictated with voice recognition software. Though review and correction are routine, we apologize for any errors. \"       If you have questions, please do not hesitate to call me. I look forward to following Makayla along with you.     Sincerely,      Jc Leavitt MD

## 2022-08-12 DIAGNOSIS — M62.830 MUSCLE SPASM OF BACK: ICD-10-CM

## 2022-08-12 DIAGNOSIS — G89.29 CHRONIC BILATERAL THORACIC BACK PAIN: ICD-10-CM

## 2022-08-12 DIAGNOSIS — M54.6 CHRONIC BILATERAL THORACIC BACK PAIN: ICD-10-CM

## 2022-08-12 RX ORDER — PSEUDOEPHED/ACETAMINOPH/DIPHEN 30MG-500MG
TABLET ORAL
Qty: 120 TABLET | Refills: 0 | Status: SHIPPED | OUTPATIENT
Start: 2022-08-12 | End: 2022-09-02

## 2022-08-12 NOTE — TELEPHONE ENCOUNTER
Medication:   Requested Prescriptions     Pending Prescriptions Disp Refills    ACETAMINOPHEN EXTRA STRENGTH 500 MG tablet [Pharmacy Med Name: Melita Morrow 500 Tablet] 120 tablet 0     Sig: TAKE ONE TABLET BY MOUTH EVERY 6 HOURS AS NEEDED FOR PAIN        Last Filled:      Patient Phone Number: [phone] (home) [phone] (work)    Last appt: 11/17/2021   Next appt: Visit date not found    Last OARRS:   RX Monitoring 4/10/2018   Attestation The Prescription Monitoring Report for this patient was reviewed today. Acute Pain Prescriptions Severe pain not adequately treated with lower dose. Periodic Controlled Substance Monitoring No signs of potential drug abuse or diversion identified.

## 2022-08-16 ENCOUNTER — OFFICE VISIT (OUTPATIENT)
Dept: ORTHOPEDIC SURGERY | Age: 62
End: 2022-08-16
Payer: MEDICAID

## 2022-08-16 DIAGNOSIS — M21.161 ACQUIRED VARUS DEFORMITY KNEE, RIGHT: ICD-10-CM

## 2022-08-16 DIAGNOSIS — M17.11 PRIMARY OSTEOARTHRITIS OF RIGHT KNEE: Primary | ICD-10-CM

## 2022-08-16 PROCEDURE — 99214 OFFICE O/P EST MOD 30 MIN: CPT | Performed by: INTERNAL MEDICINE

## 2022-08-16 RX ORDER — TIZANIDINE 4 MG/1
4 TABLET ORAL 2 TIMES DAILY PRN
Qty: 40 TABLET | Refills: 1 | Status: SHIPPED | OUTPATIENT
Start: 2022-08-16 | End: 2022-09-21

## 2022-08-16 RX ORDER — MELOXICAM 15 MG/1
15 TABLET ORAL DAILY PRN
Qty: 90 TABLET | Refills: 1 | Status: SHIPPED | OUTPATIENT
Start: 2022-08-16 | End: 2023-02-12

## 2022-08-16 RX ORDER — TRAMADOL HYDROCHLORIDE 50 MG/1
50 TABLET ORAL 2 TIMES DAILY PRN
Qty: 30 TABLET | Refills: 0 | Status: SHIPPED | OUTPATIENT
Start: 2022-08-16 | End: 2022-09-02

## 2022-08-16 NOTE — PROGRESS NOTES
Chief Complaint:   Chief Complaint   Patient presents with    Knee Pain     right, was feeling the same for 1 month after HA inj with pain up to 10/10, but has started to improve slightly past 2 days, pain getting down to 5/10, using medications and Voltaren gel to help manage the pain          History of Present Illness:       Patient is a 65 y.o. female returns follow up for the above complaint. The patient was last seen approximately 1 monthsago. The symptoms are only minimally improved since the last visit. The patient has had no further testing for the problem.        is available for today's visit    Only recent mild benefit from HA injection 7/13/2022    Despite use of cane and semirigid  bracing her knee remains problematic    She localizes pain to the medial compartment region of the knee predictably worsened by weightbearing    No new injuries no new events no pattern of active related swelling of significance    Pain levels remain moderate    She understands that TKA may be an option for her to more seriously consider     Past Medical History:        Past Medical History:   Diagnosis Date    Arthritis     Asthma     Chronic bilateral thoracic back pain 6/13/2018    GERD (gastroesophageal reflux disease)     Hypothyroidism 7/21/2021    Intractable chronic migraine without aura and without status migrainosus 7/29/2016    Primary osteoarthritis of both hands 10/20/2015    Thyroid disease         Present Medications:         Current Outpatient Medications   Medication Sig Dispense Refill    tiZANidine (ZANAFLEX) 4 MG tablet Take 1 tablet by mouth 2 times daily as needed (Muscle tightness/spasm) 40 tablet 1    diclofenac sodium (VOLTAREN) 1 % GEL Apply 2 g topically 3 times daily as needed for Pain 350 g 1    meloxicam (MOBIC) 15 MG tablet Take 1 tablet by mouth daily as needed for Pain 90 tablet 1    traMADol (ULTRAM) 50 MG tablet Take 1 tablet by mouth 2 times daily as needed for Pain for up to 15 days. 30 tablet 0    ACETAMINOPHEN EXTRA STRENGTH 500 MG tablet TAKE ONE TABLET BY MOUTH EVERY 6 HOURS AS NEEDED FOR PAIN 120 tablet 0    gabapentin (NEURONTIN) 100 MG capsule Take 300 mg by mouth nightly. methyl salicylate-menthol (MICHOACANO OLIVARES GREASELESS) 10-15 % CREA Apply topically 3 times daily as needed for Pain 30 g 3    traZODone (DESYREL) 50 MG tablet TAKE 1 TABLET BY MOUTH EVERY NIGHT 90 tablet 3    omeprazole (PRILOSEC) 20 MG delayed release capsule TAKE ONE CAPSULE BY MOUTH DAILY 90 capsule 2    levothyroxine (SYNTHROID) 50 MCG tablet Take 1 tablet by mouth daily 90 tablet 5    albuterol sulfate HFA (PROVENTIL HFA) 108 (90 Base) MCG/ACT inhaler Inhale 2 puffs into the lungs every 6 hours as needed for Wheezing or Shortness of Breath (and/or persistent cough) 1 each 3     No current facility-administered medications for this visit. Allergies:      No Known Allergies        Review of Systems:    Pertinent items are noted in HPI         Vital Signs: There were no vitals filed for this visit. General Exam:     Constitutional: Patient is adequately groomed with no evidence of malnutrition    Physical Exam: right knee      Primary Exam:    Inspection: Static varus alignment      Palpation: Tender over the M JL medial femoral condyle region      Range of Motion: 0/100      Strength: Normal with SLR      Special Tests: No gross instability      Skin: There are no rashes, ulcerations or lesions. Gait: Mild antalgic     Neurovascular - non focal and intact       Additional Comments:        Additional Examinations:                   Office Imaging Results/Procedures PerformedToday:          Office Procedures:     Orders Placed This Encounter   Procedures    MRI KNEE RIGHT WO CONTRAST     Proscan Salyer, please contact pt to schedule, see above for contact info. [facility] will obtain Bassem Cannon and aura to your facility. Pt advised to f/u in clinic 2-3 days after MRI for results. Standing Status:   Future     Standing Expiration Date:   8/17/2023     Scheduling Instructions:      Pt will need a Estonian . Can call pt's family members to schedule:      Jennifer Acuna, [phone] or      Keith Merchant, [phone]     Order Specific Question:   Reason for exam:     Answer:   r/o MMT, AVN, BMEL     Order Specific Question:   What is the sedation requirement? Answer:   None           Other Outside Imaging and Testing Personally Reviewed:    No results found. Assessment   Impression: . Encounter Diagnoses   Name Primary? Primary osteoarthritis of right knee Yes    Acquired varus deformity knee, right               Plan: Activity modification caution against overuse  Continue meloxicam and as needed use of Ultram short-term minimize use of narcotics  MRI evaluation knee evaluate for evolving stress fracture/high-grade BME L lesion or AVN process  Consider her candidate for BRANDEN if she fails all aggressive conservative management options    Approximately 30 minutes was spent related to previewing pertinent medical documentation prior to the patient's visit along with counseling during the patient's visit with respect to treatment options inclusive of alternatives to treatment and the complications and risks related to those treatment options along with expectations of outcome related to those treatments and inclusive of time in the documentation and ordering of testing and treatment after the visit. Orders:        Orders Placed This Encounter   Procedures    MRI KNEE RIGHT WO CONTRAST     Proscan Albert, please contact pt to schedule, see above for contact info. [facility] will obtain Sally Houston and aura to your facility. Pt advised to f/u in clinic 2-3 days after MRI for results. Standing Status:   Future     Standing Expiration Date:   8/17/2023     Scheduling Instructions:      Pt will need a Estonian .       Can call pt's family members to schedule:      Lolly Fleischer, [phone] or      Taina Muroarmando, [phone]     Order Specific Question:   Reason for exam:     Answer:   r/o MMT, AVN, BMEL     Order Specific Question:   What is the sedation requirement? Answer:   None         Anne Maciel MD.      Disclaimer: \"This note was dictated with voice recognition software. Though review and correction are routine, we apologize for any errors. \"

## 2022-08-17 VITALS — BODY MASS INDEX: 33.2 KG/M2 | HEIGHT: 60 IN | WEIGHT: 169.09 LBS

## 2022-08-25 NOTE — TELEPHONE ENCOUNTER
- recent baseline Cr ranging 1.7-1.8  - eventual SGLT2-i as above to delay further progression of CKD  - continue to monitor closely with diuresis Signed

## 2022-08-31 DIAGNOSIS — M17.11 PRIMARY OSTEOARTHRITIS OF RIGHT KNEE: ICD-10-CM

## 2022-09-01 DIAGNOSIS — M62.830 MUSCLE SPASM OF BACK: ICD-10-CM

## 2022-09-01 DIAGNOSIS — G89.29 CHRONIC BILATERAL THORACIC BACK PAIN: ICD-10-CM

## 2022-09-01 DIAGNOSIS — M54.6 CHRONIC BILATERAL THORACIC BACK PAIN: ICD-10-CM

## 2022-09-01 NOTE — TELEPHONE ENCOUNTER
Medication:   Requested Prescriptions     Pending Prescriptions Disp Refills    ACETAMINOPHEN EXTRA STRENGTH 500 MG tablet [Pharmacy Med Name: Bowen Fuller 500 Tablet] 120 tablet 1     Sig: TAKE ONE TABLET BY MOUTH EVERY 6 HOURS AS NEEDED FOR PAIN        Last Filled:      Patient Phone Number: [phone] (home) [phone] (work)    Last appt: 11/17/2021   Next appt: Visit date not found    Last OARRS:   RX Monitoring 8/16/2022   Attestation -   Acute Pain Prescriptions -   Periodic Controlled Substance Monitoring Possible medication side effects, risk of tolerance/dependence & alternative treatments discussed.

## 2022-09-02 RX ORDER — TRAMADOL HYDROCHLORIDE 50 MG/1
50 TABLET ORAL 2 TIMES DAILY PRN
Qty: 30 TABLET | Refills: 0 | Status: SHIPPED | OUTPATIENT
Start: 2022-09-02 | End: 2022-09-17

## 2022-09-02 RX ORDER — PSEUDOEPHED/ACETAMINOPH/DIPHEN 30MG-500MG
TABLET ORAL
Qty: 120 TABLET | Refills: 1 | Status: SHIPPED | OUTPATIENT
Start: 2022-09-02 | End: 2022-09-23

## 2022-09-07 ENCOUNTER — OFFICE VISIT (OUTPATIENT)
Dept: ORTHOPEDIC SURGERY | Age: 62
End: 2022-09-07
Payer: MEDICAID

## 2022-09-07 VITALS — BODY MASS INDEX: 33.2 KG/M2 | WEIGHT: 169.09 LBS | HEIGHT: 60 IN

## 2022-09-07 DIAGNOSIS — M21.161 ACQUIRED VARUS DEFORMITY KNEE, RIGHT: Primary | ICD-10-CM

## 2022-09-07 DIAGNOSIS — M17.11 PRIMARY OSTEOARTHRITIS OF RIGHT KNEE: ICD-10-CM

## 2022-09-07 PROCEDURE — 99214 OFFICE O/P EST MOD 30 MIN: CPT | Performed by: INTERNAL MEDICINE

## 2022-09-07 NOTE — PROGRESS NOTES
Chief Complaint:   Chief Complaint   Patient presents with    Knee Pain     F/U MRI TR R knee, feels 40% better overall. Has pressure and pulse in knee now though with some discoloration. History of Present Illness:       Patient is a 65 y.o. female returns follow up for the above complaint. The patient was last seen approximately 3 weeksago. The symptoms are improving since the last visit. The patient has had further testing for the problem. MRI completed in the interim. Overall pain has dissipated in the interim since her last visit she continues with meloxicam and only. She estimates 40% improvement overall    Use of tramadol up to twice daily she continues with cane assisted weightbearing and intermittent use functional knee bracing.  available to assist with today's office visit    Pain levels 5/10    No pattern of active related swelling of significance     Past Medical History:        Past Medical History:   Diagnosis Date    Arthritis     Asthma     Chronic bilateral thoracic back pain 6/13/2018    GERD (gastroesophageal reflux disease)     Hypothyroidism 7/21/2021    Intractable chronic migraine without aura and without status migrainosus 7/29/2016    Primary osteoarthritis of both hands 10/20/2015    Thyroid disease         Present Medications:         Current Outpatient Medications   Medication Sig Dispense Refill    diclofenac sodium (VOLTAREN) 1 % GEL Apply 2 g topically 3 times daily as needed for Pain Apply 2 -4 g topically bid to tid  as needed for Pain 350 g 5    traMADol (ULTRAM) 50 MG tablet Take 1 tablet by mouth 2 times daily as needed for Pain for up to 15 days.  30 tablet 0    ACETAMINOPHEN EXTRA STRENGTH 500 MG tablet TAKE ONE TABLET BY MOUTH EVERY 6 HOURS AS NEEDED FOR PAIN 120 tablet 1    tiZANidine (ZANAFLEX) 4 MG tablet Take 1 tablet by mouth 2 times daily as needed (Muscle tightness/spasm) 40 tablet 1    meloxicam (MOBIC) 15 MG tablet Take 1 tablet by mouth daily as needed for Pain 90 tablet 1    gabapentin (NEURONTIN) 100 MG capsule Take 300 mg by mouth nightly. methyl salicylate-menthol (MICHOACANO OLIVARES GREASELESS) 10-15 % CREA Apply topically 3 times daily as needed for Pain 30 g 3    traZODone (DESYREL) 50 MG tablet TAKE 1 TABLET BY MOUTH EVERY NIGHT 90 tablet 3    omeprazole (PRILOSEC) 20 MG delayed release capsule TAKE ONE CAPSULE BY MOUTH DAILY 90 capsule 2    levothyroxine (SYNTHROID) 50 MCG tablet Take 1 tablet by mouth daily 90 tablet 5    albuterol sulfate HFA (PROVENTIL HFA) 108 (90 Base) MCG/ACT inhaler Inhale 2 puffs into the lungs every 6 hours as needed for Wheezing or Shortness of Breath (and/or persistent cough) 1 each 3     No current facility-administered medications for this visit. Allergies:      No Known Allergies        Review of Systems:    Pertinent items are noted in HPI      Vital Signs: There were no vitals filed for this visit. General Exam:     Constitutional: Patient is adequately groomed with no evidence of malnutrition    Physical Exam: right knee      Primary Exam:    Inspection: Mild varus alignment      Palpation: Mild over the Emgel      Range of Motion: Stable unchanged from previous      Strength: Normal with submaximal resisted isometric knee extension      Special Tests: No gross instability      Skin: There are no rashes, ulcerations or lesions. Gait: Near normal     Neurovascular - non focal and intact       Additional Comments:        Additional Examinations:                    Office Imaging Results/Procedures PerformedToday:        Office Procedures:   No orders of the defined types were placed in this encounter.           Other Outside Imaging and Testing Personally Reviewed:       Patient Name: Gonzalo Mcnair   Case ID: [id number]   Patient : 1960   Referring Physician: Nicholas Zambrano MD   Exam Date: 2022   Exam Description: MR Right Knee w/o Contrast            HISTORY: 59-year-old female with right knee pain. No known injury. No history of surgery. Evaluate for osteoarthritis, medial meniscus tear, avascular necrosis, BMEL (bone marrow    edema-like lesion). TECHNICAL FACTORS:  Long- and short-axis fat- and water-weighted images were performed. COMPARISON:  None. FINDINGS:  Slight lateral tilt of the patella, without subluxation. Lateral retinaculum and    MPFL are intact. Mild chondral thinning of the patellar and trochlear articular surfaces,    without penetrating erosion or osteochondral defect. Inflammation of the lateral infrapatellar    fat pad, suggestive of chronic infrapatellar fat pad impingement. Quadriceps tendon and patellar tendon are intact. No acute tear or injury of the ACL, PCL, MCL, or lateral collateral complex. No posterolateral    or posteromedial corner injury. Chronic, closed intrameniscal signal within the medial meniscus posterior horn and posterior    body, without communicating tear. Mild partial extrusion of meniscal body. Chronic trizonal flap tear of the lateral meniscus, extending from mid-posterior horn to    mid-anterior horn, 4.5-5 cm in length. Trace retropatellar joint effusion. No intra-articular bodies. Moderate to severe chondral thinning of the medial femoral condyle and tibial plateau    weight-bearing surfaces, with full-thickness chondral loss and small penetrating erosion of the    central weight-bearing femoral condyle, and adjacent penetrating erosion of the    central-anterior tibial plateau. No prominent joint line spurring. Mild to moderate chondral thinning of the lateral femorotibial weight-bearing surfaces, without    penetrating erosion, osteochondral defect, or prominent joint line spurring. No acute fracture or bony injury. No evidence of avascular necrosis, or stress/insufficiency    fracture. No acute muscle tear or injury.        Thin, partially dehisced gastrocnemius-semimembranosus bursal cyst, with cyst remnant measuring    6.2 cm in craniocaudal height. The neurovascular bundle is intact. CONCLUSION:   1. 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 2. Chronic closed intrameniscal signal within the medial meniscus posterior horn and posterior    body, with partial meniscal extrusion, but no communicating tear. 3. More conspicuous is a chronic, trizonal 4.5-5 cm flap tear of the lateral meniscus,    extending from mid-posterior horn to mid-anterior horn. Underlying grade 2-3 lateral    femorotibial weight-bearing chondromalacia. 4. No cruciate or collateral ligament injury. 5. No evidence of avascular necrosis. Bone marrow edema scant. Thank you for the opportunity to provide your interpretation. Carito Franco MD       A: YAYA/NICO 08/24/2022 10:56 AM                 Assessment   Impression: . Encounter Diagnoses   Name Primary? Primary osteoarthritis of right knee     Acquired varus deformity knee, right Yes              Plan:     Continue by annual HA therapy and consider adjunct treatment with biologic orthopedic injection.   Continue meloxicam, Voltaren gel impairing use of Ultram minimize use of narcotics and attempt drug holidays as much as possible  Consider a candidate for TKA as needed      Approximately  30 minutes was spent related to previewing pertinent medical documentation prior to the patient's visit along with counseling during the patient's visit with respect to treatment options inclusive of alternatives to treatment and the complications and risks related to those treatment options along with expectations of outcome related to those treatments and inclusive of time in the documentation and ordering of testing and treatment after the visit. Orders:      No orders of the defined types were placed in this encounter. Juliette Ames MD.      Disclaimer: \"This note was dictated with voice recognition software. Though review and correction are routine, we apologize for any errors. \"

## 2022-09-20 DIAGNOSIS — M17.11 PRIMARY OSTEOARTHRITIS OF RIGHT KNEE: ICD-10-CM

## 2022-09-21 RX ORDER — TIZANIDINE 4 MG/1
TABLET ORAL
Qty: 40 TABLET | Refills: 1 | Status: SHIPPED | OUTPATIENT
Start: 2022-09-21 | End: 2022-11-02

## 2022-09-23 DIAGNOSIS — M62.830 MUSCLE SPASM OF BACK: ICD-10-CM

## 2022-09-23 DIAGNOSIS — M54.6 CHRONIC BILATERAL THORACIC BACK PAIN: ICD-10-CM

## 2022-09-23 DIAGNOSIS — G89.29 CHRONIC BILATERAL THORACIC BACK PAIN: ICD-10-CM

## 2022-09-23 RX ORDER — PSEUDOEPHED/ACETAMINOPH/DIPHEN 30MG-500MG
TABLET ORAL
Qty: 120 TABLET | Refills: 3 | Status: SHIPPED | OUTPATIENT
Start: 2022-09-23

## 2022-11-01 DIAGNOSIS — M17.11 PRIMARY OSTEOARTHRITIS OF RIGHT KNEE: ICD-10-CM

## 2022-11-02 RX ORDER — TIZANIDINE 4 MG/1
TABLET ORAL
Qty: 40 TABLET | Refills: 3 | Status: SHIPPED | OUTPATIENT
Start: 2022-11-02

## 2022-11-29 DIAGNOSIS — M17.11 PRIMARY OSTEOARTHRITIS OF RIGHT KNEE: ICD-10-CM

## 2022-11-30 RX ORDER — TIZANIDINE 4 MG/1
TABLET ORAL
Qty: 40 TABLET | Refills: 2 | OUTPATIENT
Start: 2022-11-30

## 2023-01-17 ENCOUNTER — OFFICE VISIT (OUTPATIENT)
Dept: ORTHOPEDIC SURGERY | Age: 63
End: 2023-01-17

## 2023-01-17 VITALS — WEIGHT: 169.09 LBS | BODY MASS INDEX: 33.2 KG/M2 | HEIGHT: 60 IN

## 2023-01-17 DIAGNOSIS — M25.561 RIGHT KNEE PAIN, UNSPECIFIED CHRONICITY: ICD-10-CM

## 2023-01-17 DIAGNOSIS — M21.161 ACQUIRED VARUS DEFORMITY KNEE, RIGHT: ICD-10-CM

## 2023-01-17 DIAGNOSIS — R20.2 PARESTHESIA OF RIGHT LOWER EXTREMITY: ICD-10-CM

## 2023-01-17 DIAGNOSIS — M17.11 PRIMARY OSTEOARTHRITIS OF RIGHT KNEE: Primary | ICD-10-CM

## 2023-01-17 RX ORDER — TRAMADOL HYDROCHLORIDE 50 MG/1
50 TABLET ORAL 2 TIMES DAILY PRN
Qty: 28 TABLET | Refills: 0 | Status: SHIPPED | OUTPATIENT
Start: 2023-01-17 | End: 2023-01-31

## 2023-01-17 RX ORDER — ROPIVACAINE HYDROCHLORIDE 5 MG/ML
5 INJECTION, SOLUTION EPIDURAL; INFILTRATION; PERINEURAL ONCE
Status: COMPLETED | OUTPATIENT
Start: 2023-01-17 | End: 2023-01-18

## 2023-01-17 RX ORDER — METHYLPREDNISOLONE ACETATE 40 MG/ML
40 INJECTION, SUSPENSION INTRA-ARTICULAR; INTRALESIONAL; INTRAMUSCULAR; SOFT TISSUE ONCE
Status: CANCELLED | OUTPATIENT
Start: 2023-01-17 | End: 2023-01-17
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
## 2023-01-18 RX ADMIN — ROPIVACAINE HYDROCHLORIDE 5 ML: 5 INJECTION, SOLUTION EPIDURAL; INFILTRATION; PERINEURAL at 14:46

## 2023-01-24 ENCOUNTER — PROCEDURE VISIT (OUTPATIENT)
Dept: NEUROLOGY | Age: 63
End: 2023-01-24
Payer: MEDICAID

## 2023-01-24 DIAGNOSIS — R20.2 PARESTHESIA OF RIGHT LOWER EXTREMITY: ICD-10-CM

## 2023-01-24 PROCEDURE — 95886 MUSC TEST DONE W/N TEST COMP: CPT | Performed by: PSYCHIATRY & NEUROLOGY

## 2023-01-24 PROCEDURE — 95908 NRV CNDJ TST 3-4 STUDIES: CPT | Performed by: PSYCHIATRY & NEUROLOGY

## 2023-01-24 NOTE — PROGRESS NOTES
Belkis Roach M.D. [facility]) Physicians/[facility]  Board Certified in [address] [facility] [address] [place] [address], [address] [place] [address]    EMG / NERVE CONDUCTION STUDY      PATIENT:  Makayla Shukla       DATE OF EM23     YOB: 1960       REASON FOR EMG:   Right leg pain      REFERRING PHYSICIAN:  MD David Khan [address]     SUMMARY:   The right peroneal motor nerve study was normal.  The right posterior tibial motor nerve study was normal.  The right sural sensory nerve study was normal  Needle EMG of several muscles in the right lower extremity was normal.      CLINICAL DIAGNOSIS:  Neuropathy        EMG RESULTS:   This is a normal EMG and nerve conduction study of the right lower extremity. There is no electrophysiological evidence for neuropathy or radiculopathy in this study. ---------------------------------------------  Belkis Roach M.D.   Electromyographer / Neurologist

## 2023-02-08 ENCOUNTER — OFFICE VISIT (OUTPATIENT)
Dept: ORTHOPEDIC SURGERY | Age: 63
End: 2023-02-08
Payer: MEDICAID

## 2023-02-08 VITALS — WEIGHT: 169 LBS | BODY MASS INDEX: 33.18 KG/M2 | HEIGHT: 60 IN

## 2023-02-08 DIAGNOSIS — M17.11 PRIMARY OSTEOARTHRITIS OF RIGHT KNEE: Primary | ICD-10-CM

## 2023-02-08 DIAGNOSIS — M21.161 ACQUIRED VARUS DEFORMITY KNEE, RIGHT: ICD-10-CM

## 2023-02-08 PROCEDURE — 99214 OFFICE O/P EST MOD 30 MIN: CPT | Performed by: INTERNAL MEDICINE

## 2023-02-08 NOTE — PROGRESS NOTES
Chief Complaint:   Chief Complaint   Patient presents with    Follow-up     EMG TR- pain meds helped about 20-25%. Pt has not been wearing the brace as it causes pain near the outside of the hip. Pain level is 8/10 today          History of Present Illness:       Patient is a 65 y.o. female returns follow up for the above complaint. The patient was last seen approximately 3 weeksago. The symptoms show no change since the last visit. The patient has had further testing for the problem. EMG completed in the interim outlined below in detail. Treatment to date has included hyaluronic acid therapy most recently performed 7/13/2022, trial of OA  bracing in addition to meloxicam and prn use of tramadol. She localizes pain primarily to the medial compartment region of the knee. The symptoms remain problematic. She is not inclined to proceed with any further injections and would like to consider arthroplasty as a treatment option which was previously discussed with her as a treatment option to entertain. Her son accompanies her in the office today and the visit is facilitated by     Pain levels 8/10.   No pattern of activity swelling or mechanical symptoms         Past Medical History:        Past Medical History:   Diagnosis Date    Arthritis     Asthma     Chronic bilateral thoracic back pain 6/13/2018    GERD (gastroesophageal reflux disease)     Hypothyroidism 7/21/2021    Intractable chronic migraine without aura and without status migrainosus 7/29/2016    Primary osteoarthritis of both hands 10/20/2015    Thyroid disease         Present Medications:         Current Outpatient Medications   Medication Sig Dispense Refill    tiZANidine (ZANAFLEX) 4 MG tablet TAKE 1 TABLET BY MOUTH TWICE DAILY AS NEEDED FOR MUSCLE TIGHTNESS AND SPASM 40 tablet 3    ACETAMINOPHEN EXTRA STRENGTH 500 MG tablet TAKE ONE TABLET BY MOUTH EVERY 6 HOURS AS NEEDED FOR PAIN 120 tablet 3    diclofenac sodium (VOLTAREN) 1 % GEL Apply 2 g topically 3 times daily as needed for Pain Apply 2 -4 g topically bid to tid  as needed for Pain 350 g 5    meloxicam (MOBIC) 15 MG tablet Take 1 tablet by mouth daily as needed for Pain 90 tablet 1    gabapentin (NEURONTIN) 100 MG capsule Take 300 mg by mouth nightly. methyl salicylate-menthol (MICHOACANO OLIVARES GREASELESS) 10-15 % CREA Apply topically 3 times daily as needed for Pain 30 g 3    traZODone (DESYREL) 50 MG tablet TAKE 1 TABLET BY MOUTH EVERY NIGHT 90 tablet 3    omeprazole (PRILOSEC) 20 MG delayed release capsule TAKE ONE CAPSULE BY MOUTH DAILY 90 capsule 2    levothyroxine (SYNTHROID) 50 MCG tablet Take 1 tablet by mouth daily 90 tablet 5    albuterol sulfate HFA (PROVENTIL HFA) 108 (90 Base) MCG/ACT inhaler Inhale 2 puffs into the lungs every 6 hours as needed for Wheezing or Shortness of Breath (and/or persistent cough) 1 each 3     No current facility-administered medications for this visit. Allergies:      No Known Allergies        Review of Systems:    Pertinent items are noted in HPI        Vital Signs: There were no vitals filed for this visit. General Exam:     Constitutional: Patient is adequately groomed with no evidence of malnutrition    Physical Exam: right knee      Primary Exam:    Inspection: Mild varus alignment no appreciable effusion or atrophy      Palpation: Focal tenderness over the MJL      Range of Motion: 0/125 pain with flexion      Strength: Pain inhibition weakness with SLR      Special Tests: Lachman test negative grade 1-2 pseudovarus instability knee is stable to valgus stressing      Skin: There are no rashes, ulcerations or lesions.       Gait: Mild antalgic     Neurovascular - non focal and intact       Additional Comments:        Additional Examinations:                   Office Imaging Results/Procedures PerformedToday:        Office Procedures:     Orders Placed This Encounter   Procedures    Eris Young MD, Orthopedic Surgery (Shoulder; Hip; Knee), Avtar Conley     Referral Priority:   Routine     Referral Type:   Eval and Treat     Referral Reason:   Specialty Services Required     Referred to Provider:   Avril Madrigal MD     Requested Specialty:   Orthopedic Surgery     Number of Visits Requested:   1           Other Outside Imaging and Testing Personally Reviewed:    PATIENT:  Neha Gilliland        DATE OF EM23      YOB: 1960        REASON FOR EMG:   Right leg pain        REFERRING PHYSICIAN:  Tina Tate MD  [facility] 21      SUMMARY:   The right peroneal motor nerve study was normal.  The right posterior tibial motor nerve study was normal.  The right sural sensory nerve study was normal  Needle EMG of several muscles in the right lower extremity was normal.        CLINICAL DIAGNOSIS:  Neuropathy           EMG RESULTS:   This is a normal EMG and nerve conduction study of the right lower extremity. There is no electrophysiological evidence for neuropathy or radiculopathy in this study. MRI 2022 right knee    CONCLUSION:   1. 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 2. Chronic closed intrameniscal signal within the medial meniscus posterior horn and posterior    body, with partial meniscal extrusion, but no communicating tear. 3. More conspicuous is a chronic, trizonal 4.5-5 cm flap tear of the lateral meniscus,    extending from mid-posterior horn to mid-anterior horn. Underlying grade 2-3 lateral    femorotibial weight-bearing chondromalacia. 4. No cruciate or collateral ligament injury. 5. No evidence of avascular necrosis. Bone marrow edema scant.        Thank you for the opportunity to provide your interpretation. Anh Lopez MD       A: YAYA/NICO 08/24/2022 10:56 AM           Assessment   Impression: . Encounter Diagnoses   Name Primary? Primary osteoarthritis of right knee Yes    Acquired varus deformity knee, right         Advanced chondropathy on MRI imaging of the weightbearing medial femoral condyle      Plan:     Surgical consultation Dr. Victoria Parra for consideration of medial compartment UKA  Continue I HEP-Quad isometrics and heel slides  Elastic compression sleeve for comfort and trigger point massage techniques demonstrated for the myofascial symptoms lateral thigh  Continue medical pain management as per previous meloxicam daily with GI precaution, minimize use of narcotics as possible-Ultram  She is not interested in biologic orthopedic injection as a treatment option  Continue cane assisted weightbearing unload right lower extremity      e of time in the documentation and ordering of testing and treatment after the visit. Orders:        Orders Placed This Encounter   Procedures    Lexa Campos MD, Orthopedic Surgery (Shoulder; Hip; Knee), Matt Little     Referral Priority:   Routine     Referral Type:   Eval and Treat     Referral Reason:   Specialty Services Required     Referred to Provider:   Keith Kenny MD     Requested Specialty:   Orthopedic Surgery     Number of Visits Requested:   1         Malathi Wilkins MD.      Kendy Ingram: \"This note was dictated with voice recognition software. Though review and correction are routine, we apologize for any errors. \"

## 2023-02-09 ENCOUNTER — OFFICE VISIT (OUTPATIENT)
Dept: ORTHOPEDIC SURGERY | Age: 63
End: 2023-02-09
Payer: MEDICAID

## 2023-02-09 VITALS — BODY MASS INDEX: 33.01 KG/M2 | HEIGHT: 60 IN

## 2023-02-09 DIAGNOSIS — M17.11 PRIMARY OSTEOARTHRITIS OF RIGHT KNEE: ICD-10-CM

## 2023-02-09 DIAGNOSIS — G89.29 BILATERAL CHRONIC KNEE PAIN: Primary | ICD-10-CM

## 2023-02-09 DIAGNOSIS — M25.561 RIGHT KNEE PAIN, UNSPECIFIED CHRONICITY: ICD-10-CM

## 2023-02-09 DIAGNOSIS — M25.561 BILATERAL CHRONIC KNEE PAIN: Primary | ICD-10-CM

## 2023-02-09 DIAGNOSIS — M25.562 BILATERAL CHRONIC KNEE PAIN: Primary | ICD-10-CM

## 2023-02-09 PROCEDURE — 99214 OFFICE O/P EST MOD 30 MIN: CPT | Performed by: ORTHOPAEDIC SURGERY

## 2023-02-09 RX ORDER — MELOXICAM 15 MG/1
15 TABLET ORAL DAILY
Qty: 30 TABLET | Refills: 0 | Status: SHIPPED | OUTPATIENT
Start: 2023-02-09 | End: 2023-03-09

## 2023-02-10 NOTE — PROGRESS NOTES
Patient: Ellis Donnelly  : 1960    MRN: [id number]    Date of Visit: 2/10/23    Attending Physician: Geetha Coe    History of Present Illness  Ms. Markus Jimenez is a very pleasant 65 y.o. patient with a several year history of progressive BILATERAL knee pain. There is no precipitating event or trauma. The pain is located predominantly in the lateral and anterior aspect of the knees aggravated by weight bearing. Walking even short distances can be painful. Stair climbing is progressing becoming more difficult and painful. However, it can also awaken the patient at night.  She has tried the following interventions without sustained functional improvement:    NSAID's/Tylenol Arthritis strength  Crtisone injections/viscosupplementation     PMH/PSH:  Past Medical History:   Diagnosis Date    Arthritis     Asthma     Chronic bilateral thoracic back pain 2018    GERD (gastroesophageal reflux disease)     Hypothyroidism 2021    Intractable chronic migraine without aura and without status migrainosus 2016    Primary osteoarthritis of both hands 10/20/2015    Thyroid disease      Patient Active Problem List   Diagnosis    Gastroesophageal reflux disease without esophagitis    RUQ pain    Pain in joint    Synovial cyst of wrist    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    Primary osteoarthritis of both hands    Intractable chronic migraine without aura and without status migrainosus    Thrombocytopenia (HCC)    Normocytic anemia    Generalized abdominal pain    Cataract of both eyes    Chronic bilateral thoracic back pain    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    Grade III hemorrhoids    Hypothyroidism    Chronic bilateral low back pain without sciatica    Chronic insomnia    Dyspepsia    Ganglion cyst    Headache    Hypovitaminosis D    Latent tuberculosis    OA (osteoarthritis)    Right shoulder pain    Vision changes     Past Surgical History: Procedure Laterality Date    HEMORRHOIDECTOMY N/A 9/23/2019    HEMORRHOIDECTOMY performed by Elidia Barajas MD at [address] [facility]:  Scheduled Meds:  Continuous Infusions:  PRN Meds:  Current Meds:  Current Outpatient Medications:     diclofenac sodium (VOLTAREN) 1 % GEL, Apply 4 g topically 4 times daily, Disp: 1 g, Rfl: 0    meloxicam (MOBIC) 15 MG tablet, Take 1 tablet by mouth daily for 28 days, Disp: 30 tablet, Rfl: 0    tiZANidine (ZANAFLEX) 4 MG tablet, TAKE 1 TABLET BY MOUTH TWICE DAILY AS NEEDED FOR MUSCLE TIGHTNESS AND SPASM, Disp: 40 tablet, Rfl: 3    ACETAMINOPHEN EXTRA STRENGTH 500 MG tablet, TAKE ONE TABLET BY MOUTH EVERY 6 HOURS AS NEEDED FOR PAIN, Disp: 120 tablet, Rfl: 3    diclofenac sodium (VOLTAREN) 1 % GEL, Apply 2 g topically 3 times daily as needed for Pain Apply 2 -4 g topically bid to tid  as needed for Pain, Disp: 350 g, Rfl: 5    meloxicam (MOBIC) 15 MG tablet, Take 1 tablet by mouth daily as needed for Pain, Disp: 90 tablet, Rfl: 1    gabapentin (NEURONTIN) 100 MG capsule, Take 300 mg by mouth nightly. , Disp: , Rfl:     methyl salicylate-menthol (MICHOACANO OLIVARES GREASELESS) 10-15 % CREA, Apply topically 3 times daily as needed for Pain, Disp: 30 g, Rfl: 3    traZODone (DESYREL) 50 MG tablet, TAKE 1 TABLET BY MOUTH EVERY NIGHT, Disp: 90 tablet, Rfl: 3    omeprazole (PRILOSEC) 20 MG delayed release capsule, TAKE ONE CAPSULE BY MOUTH DAILY, Disp: 90 capsule, Rfl: 2    levothyroxine (SYNTHROID) 50 MCG tablet, Take 1 tablet by mouth daily, Disp: 90 tablet, Rfl: 5    albuterol sulfate HFA (PROVENTIL HFA) 108 (90 Base) MCG/ACT inhaler, Inhale 2 puffs into the lungs every 6 hours as needed for Wheezing or Shortness of Breath (and/or persistent cough), Disp: 1 each, Rfl: 3    ALLERGIES:  No Known Allergies    Social History:   Social History     Socioeconomic History    Marital status:       Spouse name: Not on file    Number of children: Not on file    Years of education: Not on file    Highest education level: Not on file   Occupational History    Not on file   Tobacco Use    Smoking status: Never    Smokeless tobacco: Never   Substance and Sexual Activity    Alcohol use: No     Alcohol/week: 0.0 standard drinks    Drug use: No    Sexual activity: Never   Other Topics Concern    Not on file   Social History Narrative    Not on file     Social Determinants of Health     Financial Resource Strain: Not on file   Food Insecurity: Not on file   Transportation Needs: Not on file   Physical Activity: Not on file   Stress: Not on file   Social Connections: Not on file   Intimate Partner Violence: Not on file   Housing Stability: Not on file         Family History:   Family history is unknown by patient. Review of Systems:  No personal history of DVT, PE. 12 point ROS otherwise negative other than reported in HPI. Physical Examination:  Patient is alert and oriented x 3 and appears well nourished and appropriate for today's visit. Height:   Ht Readings from Last 3 Encounters:   02/09/23 5' (1.524 m)   02/08/23 5' (1.524 m)   01/17/23 5' (1.524 m)     Weight:   Wt Readings from Last 3 Encounters:   02/08/23 169 lb (76.7 kg)   01/17/23 169 lb 1.5 oz (76.7 kg)   09/07/22 169 lb 1.5 oz (76.7 kg)     Gait: The patient walks with antalgic gait. Right Knee: no effusion, tenderness noted throughout the musculature of the posterior anterior and lateral             Ligaments stable to varus/valgus stress at full extension and 30 degrees. AROM Right: 0-120 deg               Positive patellofemoral crepitus             Positive medial/lateral joint line tenderness     Left knee: no effusion, tenderness noted throughout the musculature of the posterior anterior and lateral             Ligaments stable to varus/valgus stress at full extension and 30 degrees.               AROM: L: 0-120 Deg             Positive patellofemoral crepitus             Positive medial/lateral joint line tenderness    Radiographs: Standing AP/lateral/Sunrise Knee: Imaging was reviewed with the patient. There are minimal degenerative changes of the BILATERAL knee. There is no evidence of fracture subluxation or dislocation. Non-Operative Treatment      Assessment and Plan?: The patient has minimal degenerative changes of the knee as well as significant periarticular muscle soreness. We discussed the diagnosis and treatment options in detail with the patient. At this point I do believe she would benefit from a course of anti-inflammatories as well as physical therapy. Provided her prescription for the cam discussed the risk and benefits of the medications and reasons to discontinue. Provided her prescription for physical therapy. I will see her back in 6 months or sooner if symptoms worsen.       ?___________________________   Sadia Lyman MD  ?   ??cc: Samantha Batista

## 2023-02-13 ENCOUNTER — HOSPITAL ENCOUNTER (OUTPATIENT)
Dept: PHYSICAL THERAPY | Age: 63
Setting detail: THERAPIES SERIES
Discharge: HOME | End: 2023-02-13
Payer: MEDICAID

## 2023-02-13 DIAGNOSIS — R26.2 DIFFICULTY WALKING: ICD-10-CM

## 2023-02-13 DIAGNOSIS — M25.561 RECURRENT PAIN OF RIGHT KNEE: Primary | ICD-10-CM

## 2023-02-13 PROCEDURE — 97110 THERAPEUTIC EXERCISES: CPT | Performed by: PHYSICAL THERAPIST

## 2023-02-13 PROCEDURE — 97162 PT EVAL MOD COMPLEX 30 MIN: CPT | Performed by: PHYSICAL THERAPIST

## 2023-02-13 PROCEDURE — 97530 THERAPEUTIC ACTIVITIES: CPT | Performed by: PHYSICAL THERAPIST

## 2023-02-13 NOTE — PLAN OF CARE
[place] [address]  Phone [phone]   Fax [phone]                                                       Physical Therapy Certification    Dear Alex Cruz MD      We had the pleasure of evaluating the following patient for physical therapy services at [address] [place] [address]. A summary of our findings can be found in the initial assessment below. This includes our plan of care. If you have any questions or concerns regarding these findings, please do not hesitate to contact me at the office phone number checked above. Thank you for the referral.       Physician Signature:_______________________________Date:__________________  By signing above (or electronic signature), therapists plan is approved by physician      Patient: Karissa Kitchen   : 1960   MRN: [id number]  Referring Physician: Alex Cruz MD        Evaluation Date: 2023      Medical Diagnosis:  Primary osteoarthritis of right knee [M17.11]  Right knee pain, unspecified chronicity [M25.561]  Treatment Diagnosis:    ICD-10-CM    1. Recurrent pain of right knee  M25.561       2.  Difficulty walking  R26.2                                             Insurance information: PT Insurance Information: [place] Medicaid     Precautions/ Contra-indications: difficulty walking, fall risk    C-SSRS Triggered by Intake questionnaire (Past 2 wk assessment):   [x] No, Questionnaire did not trigger screening.   [] Yes, Patient intake triggered further evaluation      [] C-SSRS Screening completed  [] PCP notified via Plan of Care  [] Emergency services notified     Latex Allergy:  [x]NO      []YES  Preferred Language for Healthcare:   []English       [x]other: [place]  present    SUBJECTIVE: Patient stated complaint: pain in lower right knee that has been going on for about 3 years; pt. Previously got injections in knee every 6 months but currently the pain is even worse than before; pt. Has not had injections recently; pt. Had previous effusion in knee and started to used an electric heating pad; pt. Was using a wheelchair due to the pain but is currently ambulating with a SPC; pain occasionally travels from R knee up to her hip; some sleep disturbances    Relevant Medical History:arthritis, asthma  Functional Disability Index: LEFS 7/80; 91 % disability    Pain Scale: 6-10/10  Easing factors: heating pad  Provocative factors: walking, standing     Type: [x]Constant   []Intermittent  []Radiating [x]Localized []other:     Numbness/Tingling: occasional tingling in bilateral hands and legs    Occupation/School: disabled     Living Status/Prior Level of Function: Independent with ADLs and IADLs, gardening, working in the kitchen, treadmill walking      OBJECTIVE: objective testing limited due to fear avoidance of movement and testing     PROM AROM    L R L R   Hip Flexion       Hip Abduction       Hip ER       Hip IR       Knee Flexion 125 ~70  Seated EOB     Knee Extension 0 -5     Dorsiflexion        Plantarflexion        Inversion        Eversion            Strength  LEFT RIGHT   HIP Flexors 4 3   HIP Abductors     HIP Ext     Hip ER     Knee EXT (quad) 4 refused   Knee Flex (HS) 4 refused   Ankle DF     Ankle PF     Ankle Inv     Ankle EV              Joint mobility: patellofemoral   []Normal    [x]Hypo   []Hyper    Palpation: R knee globally tender to palpation, increased tenderness medial and lateral jt. Line, inferior patella    Functional Mobility/Transfers: requires assist for management of R LE    Posture: significant guarding of R knee    observation: significant erythema present anterior R knee with evidence of prior blistering    Gait: (include devices/WB status) WBAT with SPC, pt.  Alternates hands for [address] and prefers HHA in addition to [address], antalgic gait with decreased jose alberto and step length                       [x] Patient history, allergies, meds reviewed. Medical chart reviewed. See intake form. Review Of Systems (ROS):  [x]Performed Review of systems (Integumentary, CardioPulmonary, Neurological) by intake and observation. Intake form has been scanned into medical record. Patient has been instructed to contact their primary care physician regarding ROS issues if not already being addressed at this time. Co-morbidities/Complexities (which will affect course of rehabilitation):   []None           Arthritic conditions   []Rheumatoid arthritis (M05.9)  [x]Osteoarthritis (M19.91)   Cardiovascular conditions   []Hypertension (I10)  []Hyperlipidemia (E78.5)  []Angina pectoris (I20)  []Atherosclerosis (I70)   Musculoskeletal conditions   []Disc pathology   []Congenital spine pathologies   []Prior surgical intervention  []Osteoporosis (M81.8)  []Osteopenia (M85.8)   Endocrine conditions   []Hypothyroid (E03.9)  []Hyperthyroid Gastrointestinal conditions   []Constipation (J33.82)   Metabolic conditions   []Morbid obesity (E66.01)  []Diabetes type 1(E10.65) or 2 (E11.65)   []Neuropathy (G60.9)     Pulmonary conditions   []Asthma (J45)  []Coughing   []COPD (J44.9)   Psychological Disorders  []Anxiety (F41.9)  []Depression (F32.9)   []Other:   []Other:          Barriers to/and or personal factors that will affect rehab potential:              []Age  []Sex              []Motivation/Lack of Motivation                        []Co-Morbidities              []Cognitive Function, education/learning barriers              []Environmental, home barriers              []profession/work barriers  []past PT/medical experience  [x]other: language barriers, fear of movement  Justification:     Falls Risk Assessment (30 days):   [x] Falls Risk assessed and no intervention required.   [] Falls Risk assessed and Patient requires intervention due to being higher risk   TUG score (>12s at risk): [] Falls education provided, including         ASSESSMENT:   Functional Impairments:     []Noted lumbar/proximal hip/LE joint hypomobility   [x]Decreased LE functional ROM   [x]Decreased core/proximal hip strength and neuromuscular control   [x]Decreased LE functional strength   [x]Reduced balance/proprioceptive control   []other:      Functional Activity Limitations (from functional questionnaire and intake)   [x]Reduced ability to tolerate prolonged functional positions   [x]Reduced ability or difficulty with changes of positions or transfers between positions   [x]Reduced ability to maintain good posture and demonstrate good body mechanics with sitting, bending, and lifting   [x]Reduced ability to sleep   [x] Reduced ability or tolerance with driving and/or computer work   [x]Reduced ability to perform lifting, carrying tasks   [x]Reduced ability to squat   [x]Reduced ability to forward bend   [x]Reduced ability to ambulate prolonged functional periods/distances/surfaces   [x]Reduced ability to ascend/descend stairs   [x]Reduced ability to run, hop, cut or jump   []other:    Participation Restrictions   [x]Reduced participation in self care activities   [x]Reduced participation in home management activities   []Reduced participation in work activities   [x]Reduced participation in social activities. [x]Reduced participation in sport/recreation activities. Classification :    []Signs/symptoms consistent with post-surgical status including decreased ROM, strength and function.    []Signs/symptoms consistent with joint sprain/strain   []Signs/symptoms consistent with patella-femoral syndrome   [x]Signs/symptoms consistent with knee OA/hip OA   []Signs/symptoms consistent with internal derangement of knee/Hip   []Signs/symptoms consistent with functional hip weakness/NMR control      []Signs/symptoms consistent with tendinitis/tendinosis    []signs/symptoms consistent with pathology which may benefit from Dry needling      []other:      Prognosis/Rehab Potential:      []Excellent   []Good    [x]Fair   []Poor    Tolerance of evaluation/treatment:    []Excellent   []Good    []Fair   [x]Poor    Physical Therapy Evaluation Complexity Justification  [x] A history of present problem with:  [] no personal factors and/or comorbidities that impact the plan of care;  []1-2 personal factors and/or comorbidities that impact the plan of care  [x]3 personal factors and/or comorbidities that impact the plan of care  [x] An examination of body systems using standardized tests and measures addressing any of the following: body structures and functions (impairments), activity limitations, and/or participation restrictions;:  [] a total of 1-2 or more elements   [] a total of 3 or more elements   [x] a total of 4 or more elements   [x] A clinical presentation with:  [] stable and/or uncomplicated characteristics   [x] evolving clinical presentation with changing characteristics  [] unstable and unpredictable characteristics;   [x] Clinical decision making of [] low, [x] moderate, [] high complexity using standardized patient assessment instrument and/or measurable assessment of functional outcome. [] EVAL (LOW) 48359 (typically 30 minutes face-to-face)  [x] EVAL (MOD) 16335 (typically 30 minutes face-to-face)  [] EVAL (HIGH) 70942 (typically 45 minutes face-to-face)  [] RE-EVAL     PLAN:   Frequency/Duration:  1-2 days per week for 3-4 Weeks:  Interventions:  [x]  Therapeutic exercise including: strength training, ROM, for Lower extremity and core   [x]  NMR activation and proprioception for LE, Glutes and Core   [x]  Manual therapy as indicated for LE, Hip and spine to include: Dry Needling/IASTM, STM, PROM, Gr I-IV mobilizations, manipulation.    [x] Modalities as needed that may include: thermal agents, E-stim, Biofeedback, US, iontophoresis as indicated  [x] Patient education on joint protection, postural re-education, activity modification, progression of HEP. HEP instruction: Written HEP instructions provided and reviewed. GOALS:  Patient stated goal: decrease knee pain  [] Progressing: [] Met: [] Not Met: [] Adjusted    Therapist goals for Patient:   Short Term Goals: To be achieved in: 2 weeks  1. Independent in HEP and progression per patient tolerance, in order to prevent re-injury. [] Progressing: [] Met: [] Not Met: [] Adjusted  2. Patient will have a decrease in pain to <5/10 to facilitate improvement in movement, function, and ADLs as indicated by Functional Deficits. [] Progressing: [] Met: [] Not Met: [] Adjusted    Long Term Goals: To be achieved in: 3-4 weeks  1. Patient will reach LEFS disability score of less than or equal to 50% disability to assist with reaching prior level of function with activities such as car transfers. [] Progressing: [] Met: [] Not Met: [] Adjusted  2. Patient will demonstrate increased AROM to 0-110 R knee extension/flexion to allow for proper joint functioning as indicated by patients Functional Deficits. [] Progressing: [] Met: [] Not Met: [] Adjusted  3. Patient will demonstrate an increase in Strength to at least 4/5 throughout as well as good proximal hip strength and control to allow for proper functional mobility as indicated by patients Functional Deficits. [] Progressing: [] Met: [] Not Met: [] Adjusted  4. Patient will return to walking 100ft with least restrictive AD and without increased symptoms or restriction. [] Progressing: [] Met: [] Not Met: [] Adjusted  5. Patient will perform TUG in 12sec to reduce fall risk.    [] Progressing: [] Met: [] Not Met: [] Adjusted     Electronically signed by:  Sherrill Correa PT

## 2023-02-13 NOTE — FLOWSHEET NOTE
[facility]    Physical Therapy Treatment Note/ Progress Report:     Date:  2023    Patient Name:  Jean Hernández    :  1960  MRN: [id number]  Medical Diagnosis:  Primary osteoarthritis of right knee [M17.11]  Right knee pain, unspecified chronicity [M25.561]  Treatment Diagnosis:    ICD-10-CM    1. Recurrent pain of right knee  M25.561       2. Difficulty walking  R26.2         Insurance/Certification information:  PT Insurance Information: [place] Medicaid  Physician Information:  Duane Zapien MD    Plan of care signed (Y/N): []  Yes [x]  No  Date sent: 23    Date of Patient follow up with Physician:      Progress Report: []  Yes  [x]  No     Functional Scale:     Date assessed:  LEFS    91% disability 23    Date Range for reporting period:  Beginnin23  Ending:      Progress report due (10 Rx/or 30 days whichever is less):      Recertification due (POC duration/ or 90 days whichever is less): 3/13/23     Visit # Insurance Allowable Auth required? Date Range   1 30 []  Yes  [x]  No N/a     Preferred Language for Healthcare:   []English       [x]other: [place]  present    Pain level:  6-10/10     SUBJECTIVE:  See eval    OBJECTIVE: See eval  Observation:   Test measurements:      RESTRICTIONS/PRECAUTIONS: fall risk, skin breakdown    Exercises/Interventions:     Therapeutic Ex Resistance Sets/sec Reps Notes          Quad set  1 5 Tactile facilitation   SAQ  1 5 Manual assist, limited range   Seated hip ADD PS 1 5    LAQ  1 5 Limited ROM                                                      Therapeutic Activities              Pt. education  10'  Ongoing discussion throughout therapy session about the importance of movement, reducing treadmill walking at home until symptoms reduce  Significant discussion about safety with utilization of home heating pad due to visible skin breakdown and erythema, pt.  Refused to stop utilizing heating pad while skin integrity heals so discussed using extra layers and reduced time if patient will continue to use against advice; also discussed recommendations with family member                                             Manual Intervention       STM  6'  Gentle around knee for symptom modulation                                      NMR re-education                                                                        Contacting MD staff about getting a 2 wheeled walker for patient    Pt. Education:  -pt educated on diagnosis, prognosis and expectations for rehab  -all pt questions were answered    Home Exercise Program:  Access Code: [email or web address]  URL: [email or web address]  Date: 02/13/2023  Prepared by: Tara Hudson    Exercises  Supine Quad Set - 7 x weekly - 5 reps  Supine Short Arc Quad - 1 x daily - 7 x weekly - 5 reps  Seated Hip Adduction Isometrics with Ball - 1 x daily - 7 x weekly - 5 reps  Seated Long Arc Quad - 1 x daily - 7 x weekly - 5 reps      Therapeutic Exercise and NMR EXR  [x] (38514) Provided verbal/tactile cueing for activities related to strengthening, flexibility, endurance, ROM for improvements in LE, proximal hip, and core control with self care, mobility, lifting, ambulation.  [] (30552) Provided verbal/tactile cueing for activities related to improving balance, coordination, kinesthetic sense, posture, motor skill, proprioception  to assist with LE, proximal hip, and core control in self care, mobility, lifting, ambulation and eccentric single leg control.      NMR and Therapeutic Activities:    [x] (88535 or 90332) Provided verbal/tactile cueing for activities related to improving balance, coordination, kinesthetic sense, posture, motor skill, proprioception and motor activation to allow for proper function of core, proximal hip and LE with self care and ADLs  [] (10804) Gait Re-education- Provided training and instruction to the patient for proper LE, core and proximal hip recruitment and positioning and eccentric body weight control with ambulation re-education including up and down stairs     Home Exercise Program:    [x] (98055) Reviewed/Progressed HEP activities related to strengthening, flexibility, endurance, ROM of core, proximal hip and LE for functional self-care, mobility, lifting and ambulation/stair navigation   [] (47931)Reviewed/Progressed HEP activities related to improving balance, coordination, kinesthetic sense, posture, motor skill, proprioception of core, proximal hip and LE for self care, mobility, lifting, and ambulation/stair navigation      Manual Treatments:  PROM / STM / Oscillations-Mobs:  G-I, II, III, IV (PA's, Inf., Post.)  [] (12957) Provided manual therapy to mobilize LE, proximal hip and/or LS spine soft tissue/joints for the purpose of modulating pain, promoting relaxation,  increasing ROM, reducing/eliminating soft tissue swelling/inflammation/restriction, improving soft tissue extensibility and allowing for proper ROM for normal function with self care, mobility, lifting and ambulation. Modalities:      Charges:  Timed Code Treatment Minutes: 26   Total Treatment Minutes: 60       [] EVAL (LOW) 79781 (typically 20 minutes face-to-face)  [x] EVAL (MOD) 14046 (typically 30 minutes face-to-face)  [] EVAL (HIGH) 55023 (typically 45 minutes face-to-face)  [] RE-EVAL     [x] SL(20090) x  1   [] IONTO (05691)  [] NMR (40673) x     [] VASO (64674)  [] Manual (84635) x     [] Other:  [x] TA (11145)x  1   [] Mech Traction (35685)  [] ES(attended) (46678)     [] ES (un) (40875):       GOALS:  Patient stated goal: decrease knee pain  [] Progressing: [] Met: [] Not Met: [] Adjusted    Therapist goals for Patient:   Short Term Goals: To be achieved in: 2 weeks  1. Independent in HEP and progression per patient tolerance, in order to prevent re-injury. [] Progressing: [] Met: [] Not Met: [] Adjusted  2.  Patient will have a decrease in pain to <5/10 to facilitate improvement in movement, function, and ADLs as indicated by Functional Deficits. [] Progressing: [] Met: [] Not Met: [] Adjusted    Long Term Goals: To be achieved in: 3-4 weeks  1. Patient will reach LEFS disability score of less than or equal to 50% disability to assist with reaching prior level of function with activities such as car transfers. [] Progressing: [] Met: [] Not Met: [] Adjusted  2. Patient will demonstrate increased AROM to 0-110 R knee extension/flexion to allow for proper joint functioning as indicated by patients Functional Deficits. [] Progressing: [] Met: [] Not Met: [] Adjusted  3. Patient will demonstrate an increase in Strength to at least 4/5 throughout as well as good proximal hip strength and control to allow for proper functional mobility as indicated by patients Functional Deficits. [] Progressing: [] Met: [] Not Met: [] Adjusted  4. Patient will return to walking 100ft with least restrictive AD and without increased symptoms or restriction. [] Progressing: [] Met: [] Not Met: [] Adjusted  5. Patient will perform TUG in 12sec to reduce fall risk. [] Progressing: [] Met: [] Not Met: [] Adjusted         ASSESSMENT:  See eval      Treatment/Activity Tolerance:  [] Patient tolerated treatment well [] Patient limited by fatique  [x] Patient limited by pain  [] Patient limited by other medical complications  [] Other:     Overall Progression Towards Functional goals/ Treatment Progress Update:  [] Patient is progressing as expected towards functional goals listed. [] Progression is slowed due to complexities/Impairments listed. [] Progression has been slowed due to co-morbidities.   [x] Plan just implemented, too soon to assess goals progression <30days   [] Goals require adjustment due to lack of progress  [] Patient is not progressing as expected and requires additional follow up with physician  [] Other    Prognosis for POC: [] Good [x] Fair  [] Poor    Patient requires continued skilled intervention: [x] Yes  [] No    Return to Play: (if applicable)   []  Stage 1: Intro to Strength   []  Stage 2: Return to Run and Strength   []  Stage 3: Return to Jump and Strength   []  Stage 4: Dynamic Strength and Agility   []  Stage 5: Sport Specific Training     []  Ready to Return to Play, Meets All Above Stages   []  Not Ready for Return to Sports   Comments:              PLAN: See eval  [] Continue per plan of care [] Alter current plan (see comments)  [x] Plan of care initiated [] Hold pending MD visit [] Discharge    Electronically signed by: Vickie Carson PT    Note: If patient does not return for scheduled/recommended follow up visits, this note will serve as a discharge from care along with the most recent update on progress.

## 2023-02-23 DIAGNOSIS — M17.11 PRIMARY OSTEOARTHRITIS OF RIGHT KNEE: ICD-10-CM

## 2023-02-24 ENCOUNTER — HOSPITAL ENCOUNTER (OUTPATIENT)
Dept: PHYSICAL THERAPY | Age: 63
Setting detail: THERAPIES SERIES
Discharge: HOME | End: 2023-02-24
Payer: MEDICAID

## 2023-02-24 PROCEDURE — 97530 THERAPEUTIC ACTIVITIES: CPT | Performed by: PHYSICAL THERAPIST

## 2023-02-24 PROCEDURE — 97110 THERAPEUTIC EXERCISES: CPT | Performed by: PHYSICAL THERAPIST

## 2023-02-24 RX ORDER — MELOXICAM 15 MG/1
TABLET ORAL
Qty: 30 TABLET | Refills: 0 | Status: SHIPPED | OUTPATIENT
Start: 2023-02-24

## 2023-02-24 NOTE — FLOWSHEET NOTE
[facility]    Physical Therapy Treatment Note/ Progress Report:     Date:  2023    Patient Name:  Sunday Graham    :  1960  MRN: [id number]  Medical Diagnosis:  Primary osteoarthritis of right knee [M17.11]  Right knee pain, unspecified chronicity [M25.561]  Treatment Diagnosis:      ICD-10-CM     1. Recurrent pain of right knee  M25.561         2. Difficulty walking  R26.2           Insurance/Certification information:   PT Insurance Information: [place] Medicaid  Physician Information:  Damir Cornejo MD    Plan of care signed (Y/N): []  Yes [x]  No  Date sent: 23    Date of Patient follow up with Physician:      Progress Report: []  Yes  [x]  No     Functional Scale:     Date assessed:  LEFS    91% disability 23    Date Range for reporting period:  Beginnin23  Ending:      Progress report due (10 Rx/or 30 days whichever is less):      Recertification due (POC duration/ or 90 days whichever is less): 3/13/23     Visit # Insurance Allowable Auth required? Date Range   2 30 []  Yes  [x]  No N/a     Preferred Language for Healthcare:   []English       [x]other: [facility] video  present    Pain level:  5-6/10     SUBJECTIVE:  pt. Reports that her knee is feeling a little better than last time. She continues to use the heating pad. OBJECTIVE: See eval  Observation:   Test measurements:      RESTRICTIONS/PRECAUTIONS: fall risk, skin breakdown    Exercises/Interventions:     Therapeutic Ex Resistance Sets/sec Reps Notes          Quad set  1 5 Tactile facilitation   SAQ  1 10 Manual assist, limited range   Seated hip ADD PS 1 5    LAQ  1 10 Limited ROM   Seated hip abd red 1 7    Standing hip abd  1 3    Calf raises    Pt.  refused                                 Therapeutic Activities              Pt. education  10'  Ongoing discussion throughout therapy session about benefit of utilizing a walker for improved community navigation and independence  Reviewed discussion about safety with utilization of home heating pad due to visible skin breakdown and erythema                                             Manual Intervention       STM  4'  Gentle around knee for symptom modulation                                      NMR re-education                                                                            Pt. Education:  -pt educated on diagnosis, prognosis and expectations for rehab  -all pt questions were answered    Home Exercise Program:  Access Code: [email or web address]  URL: [email or web address]  Date: 02/13/2023  Prepared by: Dannielle Esparza    Exercises  Supine Quad Set - 7 x weekly - 5 reps  Supine Short Arc Quad - 1 x daily - 7 x weekly - 5 reps  Seated Hip Adduction Isometrics with Ball - 1 x daily - 7 x weekly - 5 reps  Seated Long Arc Quad - 1 x daily - 7 x weekly - 5 reps      Therapeutic Exercise and NMR EXR  [x] (86135) Provided verbal/tactile cueing for activities related to strengthening, flexibility, endurance, ROM for improvements in LE, proximal hip, and core control with self care, mobility, lifting, ambulation.  [] (26074) Provided verbal/tactile cueing for activities related to improving balance, coordination, kinesthetic sense, posture, motor skill, proprioception  to assist with LE, proximal hip, and core control in self care, mobility, lifting, ambulation and eccentric single leg control.      NMR and Therapeutic Activities:    [x] (64034 or 45560) Provided verbal/tactile cueing for activities related to improving balance, coordination, kinesthetic sense, posture, motor skill, proprioception and motor activation to allow for proper function of core, proximal hip and LE with self care and ADLs  [] (27741) Gait Re-education- Provided training and instruction to the patient for proper LE, core and proximal hip recruitment and positioning and eccentric body weight control with ambulation re-education including up and down stairs     Home Exercise Program:    [x] (02908) Reviewed/Progressed HEP activities related to strengthening, flexibility, endurance, ROM of core, proximal hip and LE for functional self-care, mobility, lifting and ambulation/stair navigation   [] (43838)Reviewed/Progressed HEP activities related to improving balance, coordination, kinesthetic sense, posture, motor skill, proprioception of core, proximal hip and LE for self care, mobility, lifting, and ambulation/stair navigation      Manual Treatments:  PROM / STM / Oscillations-Mobs:  G-I, II, III, IV (PA's, Inf., Post.)  [] (62432) Provided manual therapy to mobilize LE, proximal hip and/or LS spine soft tissue/joints for the purpose of modulating pain, promoting relaxation,  increasing ROM, reducing/eliminating soft tissue swelling/inflammation/restriction, improving soft tissue extensibility and allowing for proper ROM for normal function with self care, mobility, lifting and ambulation. Modalities:      Charges:  Timed Code Treatment Minutes: 40   Total Treatment Minutes: 45       [] EVAL (LOW) 03690 (typically 20 minutes face-to-face)  [] EVAL (MOD) 46751 (typically 30 minutes face-to-face)  [] EVAL (HIGH) 55115 (typically 45 minutes face-to-face)  [] RE-EVAL     [x] RC(41907) x  2   [] IONTO (98331)  [] NMR (45523) x     [] VASO (95031)  [] Manual (79531) x     [] Other:  [x] TA (91349)x  1   [] Mech Traction (24550)  [] ES(attended) (25018)     [] ES (un) (22238):       GOALS:  Patient stated goal: decrease knee pain  [] Progressing: [] Met: [] Not Met: [] Adjusted    Therapist goals for Patient:   Short Term Goals: To be achieved in: 2 weeks  1. Independent in HEP and progression per patient tolerance, in order to prevent re-injury. [] Progressing: [] Met: [] Not Met: [] Adjusted  2.  Patient will have a decrease in pain to <5/10 to facilitate improvement in movement, function, and ADLs as indicated by Functional Deficits. [] Progressing: [] Met: [] Not Met: [] Adjusted    Long Term Goals: To be achieved in: 3-4 weeks  1. Patient will reach LEFS disability score of less than or equal to 50% disability to assist with reaching prior level of function with activities such as car transfers. [] Progressing: [] Met: [] Not Met: [] Adjusted  2. Patient will demonstrate increased AROM to 0-110 R knee extension/flexion to allow for proper joint functioning as indicated by patients Functional Deficits. [] Progressing: [] Met: [] Not Met: [] Adjusted  3. Patient will demonstrate an increase in Strength to at least 4/5 throughout as well as good proximal hip strength and control to allow for proper functional mobility as indicated by patients Functional Deficits. [] Progressing: [] Met: [] Not Met: [] Adjusted  4. Patient will return to walking 100ft with least restrictive AD and without increased symptoms or restriction. [] Progressing: [] Met: [] Not Met: [] Adjusted  5. Patient will perform TUG in 12sec to reduce fall risk. [] Progressing: [] Met: [] Not Met: [] Adjusted         ASSESSMENT:  Pt. Continues to be limited throughout therapy session due to pain and movement avoidance. Increased time required for patient education and instruction due to interpretation. Pt. With improved movement compared with previous therapy session but pt. Continues to refuse multiple exercises. Seated knee ROM improving compared with previous session. Improved management of R leg with transitional movements. Discussed benefits of walker to reduce fall risk and improve community navigation. Plan to continue to progress functional strength and motion to reduce pain with ambulation.        Treatment/Activity Tolerance:  [] Patient tolerated treatment well [] Patient limited by fatique  [x] Patient limited by pain  [] Patient limited by other medical complications  [] Other:     Overall Progression Towards Functional goals/ Treatment Progress Update:  [] Patient is progressing as expected towards functional goals listed. [] Progression is slowed due to complexities/Impairments listed. [] Progression has been slowed due to co-morbidities. [x] Plan just implemented, too soon to assess goals progression <30days   [] Goals require adjustment due to lack of progress  [] Patient is not progressing as expected and requires additional follow up with physician  [] Other    Prognosis for POC: [] Good [x] Fair  [] Poor    Patient requires continued skilled intervention: [x] Yes  [] No    Return to Play: (if applicable)   []  Stage 1: Intro to Strength   []  Stage 2: Return to Run and Strength   []  Stage 3: Return to Jump and Strength   []  Stage 4: Dynamic Strength and Agility   []  Stage 5: Sport Specific Training     []  Ready to Return to Play, Meets All Above Stages   []  Not Ready for Return to Sports   Comments:              PLAN: See eval  [x] Continue per plan of care [] Alter current plan (see comments)  [] Plan of care initiated [] Hold pending MD visit [] Discharge    Electronically signed by: Nic Klein PT    Note: If patient does not return for scheduled/recommended follow up visits, this note will serve as a discharge from care along with the most recent update on progress.

## 2023-03-03 ENCOUNTER — HOSPITAL ENCOUNTER (OUTPATIENT)
Dept: PHYSICAL THERAPY | Age: 63
Setting detail: THERAPIES SERIES
Discharge: HOME | End: 2023-03-03

## 2023-03-03 NOTE — FLOWSHEET NOTE
[facility]    Physical Therapy  Cancellation/No-show Note  Patient Name:  Jv Levine  :  1960   Date:  3/3/2023  Cancelled visits to date: 0  No-shows to date: 1    For today's appointment patient:  []  Cancelled  []  Rescheduled appointment  [x]  No-show     Reason given by patient:  []  Patient ill  []  Conflicting appointment  []  No transportation    []  Conflict with work  []  No reason given  []  Other:     Comments:      Phone call information:   []  Phone call made today to patient at _ time at number provided:      []  Patient answered, conversation as follows:    []  Patient did not answer, message left as follows:  [x]  Phone call not made today - this is patient's last scheduled appointment  []  Phone call not needed - pt contacted us to cancel and provided reason for cancellation.      Electronically signed by:  Abdi Heaton, PT, DPT

## 2023-03-17 ENCOUNTER — HOSPITAL ENCOUNTER (OUTPATIENT)
Dept: PHYSICAL THERAPY | Age: 63
Setting detail: THERAPIES SERIES
Discharge: HOME | End: 2023-03-17
Payer: MEDICAID

## 2023-03-17 PROCEDURE — 97530 THERAPEUTIC ACTIVITIES: CPT | Performed by: PHYSICAL THERAPIST

## 2023-03-17 PROCEDURE — 97110 THERAPEUTIC EXERCISES: CPT | Performed by: PHYSICAL THERAPIST

## 2023-03-17 PROCEDURE — 97140 MANUAL THERAPY 1/> REGIONS: CPT | Performed by: PHYSICAL THERAPIST

## 2023-03-17 NOTE — PLAN OF CARE
[employer]    Physical Therapy Re-Certification Plan of Care    Dear Edson Whaley MD  ,    We had the pleasure of treating the following patient for physical therapy services at [facility]. A summary of our findings can be found in the updated assessment below. This includes our plan of care. If you have any questions or concerns regarding these findings, please do not hesitate to contact me at the office phone number checked above. Thank you for the referral.     Physician Signature:________________________________Date:__________________  By signing above (or electronic signature), therapist's plan is approved by physician        Overall Response to Treatment:   []Patient is responding well to treatment and improvement is noted with regards  to goals   []Patient should continue to improve in reasonable time if they continue HEP   []Patient has plateaued and is no longer responding to skilled PT intervention    []Patient is getting worse and would benefit from return to referring MD   []Patient unable to adhere to initial POC   [x]Other: pt. Progressing very slowly in therapy at this time. Pt. Continues to require constant encouragement and cueing to complete exercises. Slight improvement in knee flexion motion    Date range of Visits: 23-3/17/23  Total Visits: 3    Recommendation:    [x]Continue PT 1x / wk for 4 weeks. []Hold PT, pending MD visit        Physical Therapy Treatment Note/ Progress Report:     Date:  2023    Patient Name:  Irina Patel    :  1960  MRN: [id number]  Medical Diagnosis:  Primary osteoarthritis of right knee [M17.11]  Right knee pain, unspecified chronicity [M25.561]  Treatment Diagnosis:      ICD-10-CM     1. Recurrent pain of right knee  M25.561         2.  Difficulty walking  R26.2           Insurance/Certification information:   PT Insurance Information: [place] Medicaid  Physician Information:  Aureliano Cox MD    Plan of care signed (Y/N): [x]  Yes []  No  Date sent: 23    Date of Patient follow up with Physician:      Progress Report: [x]  Yes  []  No     Functional Scale:     Date assessed:  LEFS    91% disability 23    Date Range for reporting period:  Beginnin23  POC: 3/17/23  Ending:      Progress report due (10 Rx/or 30 days whichever is less): 60     Recertification due (POC duration/ or 90 days whichever is less): 23     Visit # Insurance Allowable Auth required? Date Range   3 30 []  Yes  [x]  No N/a     Preferred Language for Healthcare:   []English       [x]other: [facility] video  present    Pain level:  5/10     SUBJECTIVE:  Pt. Reports that she is feeling a little better. Pt. States that she continues to have a lot of pain in knee and thigh. OBJECTIVE: 3/17     PROM AROM     L R L R   Knee Flexion ~85  Seated EOB       Knee Extension -5             Strength  LEFT RIGHT   HIP Flexors 4 3   HIP Abductors       HIP Ext       Hip ER       Knee EXT (quad) 4 3+   Knee Flex (HS) 4 3+               RESTRICTIONS/PRECAUTIONS: fall risk, skin breakdown    Exercises/Interventions:     Therapeutic Ex Resistance Sets/sec Reps Notes          Quad set  1 5 Tactile facilitation   SAQ  1 5 Manual assist, limited range   Seated hip ADD PS 1 10    LAQ  1 10 Limited ROM   Seated hip abd peach 1 10       Seated marching    Pt.  Refused with R LE   Calf raises seated  1 10    Recumbent bike rocking 5'                            Therapeutic Activities              Pt. education  10'  continued discussion throughout therapy session about benefit of utilizing a walker for improved community navigation and independence; reviewed correct use of cane when utilizing SPC                                               Manual Intervention       STM  4'  Gentle around knee for symptom modulation   Seated EOB knee flexion 5'                                 NMR re-education                                                                            Pt. Education:  -pt educated on diagnosis, prognosis and expectations for rehab  -all pt questions were answered    Home Exercise Program:  Access Code: [email or web address]  URL: [email or web address]  Date: 02/13/2023  Prepared by: Van Tierney    Exercises  Supine Quad Set - 7 x weekly - 5 reps  Supine Short Arc Quad - 1 x daily - 7 x weekly - 5 reps  Seated Hip Adduction Isometrics with Ball - 1 x daily - 7 x weekly - 5 reps  Seated Long Arc Quad - 1 x daily - 7 x weekly - 5 reps      Therapeutic Exercise and NMR EXR  [x] (40158) Provided verbal/tactile cueing for activities related to strengthening, flexibility, endurance, ROM for improvements in LE, proximal hip, and core control with self care, mobility, lifting, ambulation.  [] (43098) Provided verbal/tactile cueing for activities related to improving balance, coordination, kinesthetic sense, posture, motor skill, proprioception  to assist with LE, proximal hip, and core control in self care, mobility, lifting, ambulation and eccentric single leg control.      NMR and Therapeutic Activities:    [x] (76751 or 89943) Provided verbal/tactile cueing for activities related to improving balance, coordination, kinesthetic sense, posture, motor skill, proprioception and motor activation to allow for proper function of core, proximal hip and LE with self care and ADLs  [] (22693) Gait Re-education- Provided training and instruction to the patient for proper LE, core and proximal hip recruitment and positioning and eccentric body weight control with ambulation re-education including up and down stairs     Home Exercise Program:    [x] (81174) Reviewed/Progressed HEP activities related to strengthening, flexibility, endurance, ROM of core, proximal hip and LE for functional self-care, mobility, lifting and ambulation/stair navigation   [] (46766)Reviewed/Progressed HEP activities related to improving balance, coordination, kinesthetic sense, posture, motor skill, proprioception of core, proximal hip and LE for self care, mobility, lifting, and ambulation/stair navigation      Manual Treatments:  PROM / STM / Oscillations-Mobs:  G-I, II, III, IV (PA's, Inf., Post.)  [x] (38801) Provided manual therapy to mobilize LE, proximal hip and/or LS spine soft tissue/joints for the purpose of modulating pain, promoting relaxation,  increasing ROM, reducing/eliminating soft tissue swelling/inflammation/restriction, improving soft tissue extensibility and allowing for proper ROM for normal function with self care, mobility, lifting and ambulation. Modalities:      Charges:  Timed Code Treatment Minutes: 40   Total Treatment Minutes: 50       [] EVAL (LOW) 29376 (typically 20 minutes face-to-face)  [] EVAL (MOD) 88463 (typically 30 minutes face-to-face)  [] EVAL (HIGH) 76642 (typically 45 minutes face-to-face)  [] RE-EVAL     [x] IK(35254) x  1   [] IONTO (80362)  [] NMR (28467) x     [] VASO (68060)  [x] Manual (97182) x 1    [] Other:  [x] TA (69852)x  1   [] Mech Traction (40558)  [] ES(attended) (31129)     [] ES (un) (91141):       GOALS:  Patient stated goal: decrease knee pain  [] Progressing: [] Met: [x] Not Met: [] Adjusted    Therapist goals for Patient:   Short Term Goals: To be achieved in: 2 weeks  1. Independent in HEP and progression per patient tolerance, in order to prevent re-injury. [] Progressing: [] Met: [x] Not Met: [] Adjusted  2. Patient will have a decrease in pain to <5/10 to facilitate improvement in movement, function, and ADLs as indicated by Functional Deficits. [x] Progressing: [] Met: [] Not Met: [] Adjusted    Long Term Goals: To be achieved in: 3-4 weeks  1.  Patient will reach LEFS disability score of less than or equal to 50% disability to assist with reaching prior level of function with activities such as car transfers. [] Progressing: [] Met: [x] Not Met: [] Adjusted  2. Patient will demonstrate increased AROM to 0-110 R knee extension/flexion to allow for proper joint functioning as indicated by patients Functional Deficits. [x] Progressing: [] Met: [] Not Met: [] Adjusted  3. Patient will demonstrate an increase in Strength to at least 4/5 throughout as well as good proximal hip strength and control to allow for proper functional mobility as indicated by patients Functional Deficits. [x] Progressing: [] Met: [] Not Met: [] Adjusted  4. Patient will return to walking 100ft with least restrictive AD and without increased symptoms or restriction. [] Progressing: [] Met: [x] Not Met: [] Adjusted  5. Patient will perform TUG in 12sec to reduce fall risk. [] Progressing: [] Met: [x] Not Met: [] Adjusted         ASSESSMENT:  Pt. Continues to be significantly limited in therapy session due to pain. Pt. Continues to require constant cueing and encouragement to complete exercises. Significant muscle guarding limiting ability to assist with transfer movements. Able to increase exercise with LAQ but continues to have limited range. Again educated pt. On benefits of getting a walker for improved independence and mobility. 1 LOB in clinic requiring cueing Mod A to stabilize. Discussed with pt. The need to complete exercise in therapy and at home in order to improve functional mobility and strength. Treatment/Activity Tolerance:  [] Patient tolerated treatment well [] Patient limited by fatique  [x] Patient limited by pain  [] Patient limited by other medical complications  [] Other:     Overall Progression Towards Functional goals/ Treatment Progress Update:  [] Patient is progressing as expected towards functional goals listed. [x] Progression is slowed due to complexities/Impairments listed. [x] Progression has been slowed due to co-morbidities.   [] Plan just implemented, too soon to assess goals progression <30days   [] Goals require adjustment due to lack of progress  [] Patient is not progressing as expected and requires additional follow up with physician  [] Other    Prognosis for POC: [] Good [x] Fair  [] Poor    Patient requires continued skilled intervention: [x] Yes  [] No    Return to Play: (if applicable)   []  Stage 1: Intro to Strength   []  Stage 2: Return to Run and Strength   []  Stage 3: Return to Jump and Strength   []  Stage 4: Dynamic Strength and Agility   []  Stage 5: Sport Specific Training     []  Ready to Return to Play, Meets All Above Stages   []  Not Ready for Return to Sports   Comments:              PLAN: See eval  [x] Continue per plan of care [] Alter current plan (see comments)  [] Plan of care initiated [] Hold pending MD visit [] Discharge    Electronically signed by: Ross Baeza PT    Note: If patient does not return for scheduled/recommended follow up visits, this note will serve as a discharge from care along with the most recent update on progress.

## 2023-03-24 ENCOUNTER — HOSPITAL ENCOUNTER (OUTPATIENT)
Dept: PHYSICAL THERAPY | Age: 63
Setting detail: THERAPIES SERIES
Discharge: HOME | End: 2023-03-24
Payer: MEDICAID

## 2023-03-24 PROCEDURE — 97110 THERAPEUTIC EXERCISES: CPT | Performed by: PHYSICAL THERAPIST

## 2023-03-24 PROCEDURE — 97530 THERAPEUTIC ACTIVITIES: CPT | Performed by: PHYSICAL THERAPIST

## 2023-03-24 NOTE — FLOWSHEET NOTE
limited by other medical complications  [] Other:     Overall Progression Towards Functional goals/ Treatment Progress Update:  [] Patient is progressing as expected towards functional goals listed. [x] Progression is slowed due to complexities/Impairments listed. [x] Progression has been slowed due to co-morbidities. [] Plan just implemented, too soon to assess goals progression <30days   [] Goals require adjustment due to lack of progress  [] Patient is not progressing as expected and requires additional follow up with physician  [] Other    Prognosis for POC: [] Good [x] Fair  [] Poor    Patient requires continued skilled intervention: [x] Yes  [] No    Return to Play: (if applicable)   []  Stage 1: Intro to Strength   []  Stage 2: Return to Run and Strength   []  Stage 3: Return to Jump and Strength   []  Stage 4: Dynamic Strength and Agility   []  Stage 5: Sport Specific Training     []  Ready to Return to Play, Meets All Above Stages   []  Not Ready for Return to Sports   Comments:              PLAN: See eval  [x] Continue per plan of care [] Alter current plan (see comments)  [] Plan of care initiated [] Hold pending MD visit [] Discharge    Electronically signed by: Axel Segovia PT    Note: If patient does not return for scheduled/recommended follow up visits, this note will serve as a discharge from care along with the most recent update on progress.

## 2023-04-18 ENCOUNTER — OFFICE VISIT (OUTPATIENT)
Dept: ORTHOPEDIC SURGERY | Age: 63
End: 2023-04-18

## 2023-04-18 VITALS — WEIGHT: 159.1 LBS | BODY MASS INDEX: 31.07 KG/M2

## 2023-04-18 DIAGNOSIS — M21.161 ACQUIRED VARUS DEFORMITY KNEE, RIGHT: Primary | ICD-10-CM

## 2023-04-18 DIAGNOSIS — M17.11 PRIMARY OSTEOARTHRITIS OF RIGHT KNEE: ICD-10-CM

## 2023-04-18 RX ORDER — METHYLPREDNISOLONE ACETATE 40 MG/ML
40 INJECTION, SUSPENSION INTRA-ARTICULAR; INTRALESIONAL; INTRAMUSCULAR; SOFT TISSUE ONCE
Status: COMPLETED | OUTPATIENT
Start: 2023-04-18 | End: 2023-04-18

## 2023-04-18 RX ORDER — ROPIVACAINE HYDROCHLORIDE 5 MG/ML
4 INJECTION, SOLUTION EPIDURAL; INFILTRATION; PERINEURAL ONCE
Status: COMPLETED | OUTPATIENT
Start: 2023-04-18 | End: 2023-04-18

## 2023-04-18 RX ADMIN — METHYLPREDNISOLONE ACETATE 40 MG: 40 INJECTION, SUSPENSION INTRA-ARTICULAR; INTRALESIONAL; INTRAMUSCULAR; SOFT TISSUE at 11:19

## 2023-04-18 RX ADMIN — ROPIVACAINE HYDROCHLORIDE 4 ML: 5 INJECTION, SOLUTION EPIDURAL; INFILTRATION; PERINEURAL at 11:20

## 2023-04-18 NOTE — PROGRESS NOTES
Chief Complaint:   Chief Complaint   Patient presents with    Knee Pain     R knee. Referred by Irina Lehman for a steroid injection. History of Present Illness:       Patient is a 65 y.o. female returns follow up for the above complaint. The patient was evaluated by adult reconstruction Dr. Dre Webb on 2/10/2023. That note was reviewed in the medical record. The symptoms remain problematic since the last visit. The patient has had no further testing for the problem. Prior work-up has included MRI which is evaluated and reviewed below. Knee remains problematic.     She has entertained and is considering elective TKA for later this year    Pain localized to the medial compartment of the knee she would like to consider repeat intra-articular steroid injection as a treatment option    Pain levels 5/10    Accompanied by her adult daughter           Past Medical History:        Past Medical History:   Diagnosis Date    Arthritis     Asthma     Chronic bilateral thoracic back pain 6/13/2018    GERD (gastroesophageal reflux disease)     Hypothyroidism 7/21/2021    Intractable chronic migraine without aura and without status migrainosus 7/29/2016    Primary osteoarthritis of both hands 10/20/2015    Thyroid disease         Present Medications:         Current Outpatient Medications   Medication Sig Dispense Refill    diclofenac sodium (VOLTAREN) 1 % GEL Apply 2 g topically 3 times daily as needed for Pain Apply 2 -4 g topically bid to tid  as needed for Pain 350 g 5    meloxicam (MOBIC) 15 MG tablet TAKE 1 TABLET BY MOUTH DAILY 30 tablet 0    tiZANidine (ZANAFLEX) 4 MG tablet TAKE 1 TABLET BY MOUTH TWICE DAILY AS NEEDED FOR MUSCLE TIGHTNESS AND SPASM 40 tablet 3    ACETAMINOPHEN EXTRA STRENGTH 500 MG tablet TAKE ONE TABLET BY MOUTH EVERY 6 HOURS AS NEEDED FOR PAIN 120 tablet 3    meloxicam (MOBIC) 15 MG tablet Take 1 tablet by mouth daily as needed for Pain 90 tablet 1    gabapentin (NEURONTIN) 100 MG capsule

## 2023-04-26 ENCOUNTER — PATIENT MESSAGE (OUTPATIENT)
Dept: ORTHOPEDIC SURGERY | Age: 63
End: 2023-04-26

## 2023-05-15 DIAGNOSIS — M17.11 PRIMARY OSTEOARTHRITIS OF RIGHT KNEE: ICD-10-CM

## 2023-05-15 RX ORDER — TIZANIDINE 4 MG/1
TABLET ORAL
Qty: 40 TABLET | Refills: 1 | OUTPATIENT
Start: 2023-05-15

## 2023-05-15 RX ORDER — TIZANIDINE 4 MG/1
TABLET ORAL
Qty: 40 TABLET | Refills: 3 | Status: SHIPPED | OUTPATIENT
Start: 2023-05-15

## 2023-07-26 ENCOUNTER — OFFICE VISIT (OUTPATIENT)
Dept: ORTHOPEDIC SURGERY | Age: 63
End: 2023-07-26

## 2023-07-26 VITALS — BODY MASS INDEX: 31.22 KG/M2 | WEIGHT: 159 LBS | HEIGHT: 60 IN

## 2023-07-26 DIAGNOSIS — M17.11 PRIMARY OSTEOARTHRITIS OF RIGHT KNEE: Primary | ICD-10-CM

## 2023-07-26 DIAGNOSIS — M21.161 ACQUIRED VARUS DEFORMITY KNEE, RIGHT: ICD-10-CM

## 2023-07-26 DIAGNOSIS — M17.11 PRIMARY OSTEOARTHRITIS OF RIGHT KNEE: ICD-10-CM

## 2023-07-26 RX ORDER — MELOXICAM 15 MG/1
15 TABLET ORAL DAILY PRN
Qty: 30 TABLET | Refills: 2 | Status: SHIPPED | OUTPATIENT
Start: 2023-07-26

## 2023-07-26 RX ORDER — TIZANIDINE 4 MG/1
TABLET ORAL
Qty: 60 TABLET | Refills: 3 | Status: SHIPPED | OUTPATIENT
Start: 2023-07-26

## 2023-07-26 NOTE — PROGRESS NOTES
Chief Complaint:   Chief Complaint   Patient presents with    Follow-up     F/U RT KNEE          History of Present Illness:       Patient is a 59 y.o. female returns follow up for the above complaint. The patient was last seen approximately 3 monthsago. The symptoms are worsening since the last visit. The patient has had no further testing for the problem. Therapeutic benefit from the most recent CSI performed 4/18/2023    Pain is resurfacing over the past 2 weeks localized to the anteromedial compartment region of the knee    She would like to consider repeat steroid injection or HA injection. No pattern of active related swelling of significance    She continues with as needed use of Tylenol and/or meloxicam.    No mechanical symptoms no subjective instability pain levels at least mild to moderate. Past Medical History:        Past Medical History:   Diagnosis Date    Arthritis     Asthma     Chronic bilateral thoracic back pain 6/13/2018    GERD (gastroesophageal reflux disease)     Hypothyroidism 7/21/2021    Intractable chronic migraine without aura and without status migrainosus 7/29/2016    Primary osteoarthritis of both hands 10/20/2015    Thyroid disease         Present Medications:         Current Outpatient Medications   Medication Sig Dispense Refill    meloxicam (MOBIC) 15 MG tablet Take 1 tablet by mouth daily as needed for Pain 30 tablet 2    tiZANidine (ZANAFLEX) 4 MG tablet TAKE 1 TABLET BY MOUTH TWICE DAILY AS NEEDED FOR MUSCLE TIGHTNESS AND SPASM 60 tablet 3    diclofenac sodium (VOLTAREN) 1 % GEL Apply 2 g topically 3 times daily as needed for Pain Apply 2 -4 g topically bid to tid  as needed for Pain 350 g 5    ACETAMINOPHEN EXTRA STRENGTH 500 MG tablet TAKE ONE TABLET BY MOUTH EVERY 6 HOURS AS NEEDED FOR PAIN 120 tablet 3    gabapentin (NEURONTIN) 100 MG capsule Take 300 mg by mouth nightly.        methyl salicylate-menthol (MICHOACANO OLIVARES GREASELESS) 10-15 % CREA Apply topically 3 times

## 2023-10-11 RX ORDER — MELOXICAM 15 MG/1
15 TABLET ORAL DAILY PRN
Qty: 30 TABLET | Refills: 2 | Status: SHIPPED | OUTPATIENT
Start: 2023-10-11

## 2023-10-31 ENCOUNTER — OFFICE VISIT (OUTPATIENT)
Dept: ORTHOPEDIC SURGERY | Age: 63
End: 2023-10-31
Payer: MEDICAID

## 2023-10-31 VITALS — BODY MASS INDEX: 31.22 KG/M2 | WEIGHT: 159 LBS | HEIGHT: 60 IN

## 2023-10-31 DIAGNOSIS — M21.161 ACQUIRED VARUS DEFORMITY KNEE, RIGHT: ICD-10-CM

## 2023-10-31 DIAGNOSIS — M25.561 RIGHT KNEE PAIN, UNSPECIFIED CHRONICITY: ICD-10-CM

## 2023-10-31 DIAGNOSIS — M17.11 PRIMARY OSTEOARTHRITIS OF RIGHT KNEE: Primary | ICD-10-CM

## 2023-10-31 PROCEDURE — 99213 OFFICE O/P EST LOW 20 MIN: CPT | Performed by: INTERNAL MEDICINE

## 2023-10-31 PROCEDURE — 20610 DRAIN/INJ JOINT/BURSA W/O US: CPT | Performed by: INTERNAL MEDICINE

## 2023-10-31 RX ORDER — METHYLPREDNISOLONE ACETATE 40 MG/ML
40 INJECTION, SUSPENSION INTRA-ARTICULAR; INTRALESIONAL; INTRAMUSCULAR; SOFT TISSUE ONCE
Status: COMPLETED | OUTPATIENT
Start: 2023-10-31 | End: 2023-10-31

## 2023-10-31 RX ORDER — ROPIVACAINE HYDROCHLORIDE 5 MG/ML
4 INJECTION, SOLUTION EPIDURAL; INFILTRATION; PERINEURAL ONCE
Status: COMPLETED | OUTPATIENT
Start: 2023-10-31 | End: 2023-10-31

## 2023-10-31 RX ADMIN — METHYLPREDNISOLONE ACETATE 40 MG: 40 INJECTION, SUSPENSION INTRA-ARTICULAR; INTRALESIONAL; INTRAMUSCULAR; SOFT TISSUE at 16:10

## 2023-10-31 RX ADMIN — ROPIVACAINE HYDROCHLORIDE 4 ML: 5 INJECTION, SOLUTION EPIDURAL; INFILTRATION; PERINEURAL at 16:11

## 2023-11-03 NOTE — PROGRESS NOTES
Chief Complaint:   Chief Complaint   Patient presents with    Knee Pain     Right knee pain- has been experiencing severe knee pain 1 month post injection. Was unable to get appointment due to , pain has since calmed down. Is now experiencing some back pain, unsure if related to knee          History of Present Illness:       Patient is a 59 y.o. female returns follow up for the above complaint. The patient was last seen approximately 3 monthsago. The symptoms are worsening since the last visit. The patient has had no further testing for the problem.       Status post HA therapy 7/26/2023 with benefit    Unfortunately the pain is resurfacing and localized to anterior medial compartment region of the knee primarily    No pattern of active related swelling or new onset mechanical symptoms    Pain levels mild to moderate    She continues with cane assisted ambulation    She continues on meloxicam and intermittent use of Tylenol and Voltaren gel    She is not interested in proceeding with TKA at this time   Past Medical History:        Past Medical History:   Diagnosis Date    Arthritis     Asthma     Chronic bilateral thoracic back pain 6/13/2018    GERD (gastroesophageal reflux disease)     Hypothyroidism 7/21/2021    Intractable chronic migraine without aura and without status migrainosus 7/29/2016    Primary osteoarthritis of both hands 10/20/2015    Thyroid disease         Present Medications:         Current Outpatient Medications   Medication Sig Dispense Refill    meloxicam (MOBIC) 15 MG tablet TAKE 1 TABLET BY MOUTH DAILY AS NEEDED FOR PAIN 30 tablet 2    tiZANidine (ZANAFLEX) 4 MG tablet TAKE 1 TABLET BY MOUTH TWICE DAILY AS NEEDED FOR MUSCLE TIGHTNESS AND SPASM 60 tablet 3    diclofenac sodium (VOLTAREN) 1 % GEL Apply 2 g topically 3 times daily as needed for Pain Apply 2 -4 g topically bid to tid  as needed for Pain 350 g 5    ACETAMINOPHEN EXTRA STRENGTH 500 MG tablet TAKE ONE TABLET BY MOUTH

## 2023-12-13 DIAGNOSIS — M62.830 MUSCLE SPASM OF BACK: ICD-10-CM

## 2023-12-13 DIAGNOSIS — M54.6 CHRONIC BILATERAL THORACIC BACK PAIN: ICD-10-CM

## 2023-12-13 DIAGNOSIS — G89.29 CHRONIC BILATERAL THORACIC BACK PAIN: ICD-10-CM

## 2023-12-13 RX ORDER — PSEUDOEPHED/ACETAMINOPH/DIPHEN 30MG-500MG
TABLET ORAL
Qty: 120 TABLET | Refills: 0 | OUTPATIENT
Start: 2023-12-13

## 2023-12-13 RX ORDER — PSEUDOEPHED/ACETAMINOPH/DIPHEN 30MG-500MG
TABLET ORAL
Qty: 46 TABLET | Refills: 3 | OUTPATIENT
Start: 2023-12-13

## 2023-12-13 RX ORDER — PSEUDOEPHED/ACETAMINOPH/DIPHEN 30MG-500MG
TABLET ORAL
Qty: 120 TABLET | Refills: 1 | OUTPATIENT
Start: 2023-12-13

## 2023-12-13 NOTE — TELEPHONE ENCOUNTER
Medication:   Requested Prescriptions     Pending Prescriptions Disp Refills    Acetaminophen Extra Strength 500 MG TABS [Pharmacy Med Name: ACETAMINOPHEN 500 MG EXTRA 500 Tablet] 46 tablet 3     Sig: TAKE ONE TABLET BY MOUTH EVERY 6 HOURS AS NEEDED FOR PAIN        Last Filled:      Patient Phone Number: [phone] (home) [phone] (work)    Last appt: 11/17/2021   Next appt: 12/13/2023    Last OARRS:       1/17/2023     5:58 PM   RX Monitoring   Periodic Controlled Substance Monitoring Possible medication side effects, risk of tolerance/dependence & alternative treatments discussed.

## 2023-12-13 NOTE — TELEPHONE ENCOUNTER
Medication:   Requested Prescriptions     Pending Prescriptions Disp Refills    Acetaminophen Extra Strength 500 MG TABS [Pharmacy Med Name: ACETAMINOPHEN 500 MG EXTRA 500 Tablet] 120 tablet 1     Sig: TAKE ONE TABLET BY MOUTH EVERY 6 HOURS AS NEEDED FOR PAIN        Last Filled:      Patient Phone Number: [phone] (home) [phone] (work)    Last appt: 11/17/2021   Next appt: 12/13/2023    Last OARRS:       1/17/2023     5:58 PM   RX Monitoring   Periodic Controlled Substance Monitoring Possible medication side effects, risk of tolerance/dependence & alternative treatments discussed.

## 2023-12-13 NOTE — TELEPHONE ENCOUNTER
Medication:   Requested Prescriptions     Pending Prescriptions Disp Refills    Acetaminophen Extra Strength 500 MG TABS [Pharmacy Med Name: ACETAMINOPHEN 500 MG EXTRA 500 Tablet] 120 tablet 0     Sig: TAKE ONE TABLET BY MOUTH EVERY 6 HOURS AS NEEDED FOR PAIN        Last Filled:      Patient Phone Number: [phone] (home) [phone] (work)    Last appt: 11/17/2021   Next appt: Visit date not found    Last OARRS:       1/17/2023     5:58 PM   RX Monitoring   Periodic Controlled Substance Monitoring Possible medication side effects, risk of tolerance/dependence & alternative treatments discussed.

## 2023-12-13 NOTE — TELEPHONE ENCOUNTER
Medication:   Requested Prescriptions     Pending Prescriptions Disp Refills    Acetaminophen Extra Strength 500 MG TABS [Pharmacy Med Name: ACETAMINOPHEN 500 MG EXTRA 500 Tablet] 120 tablet 0     Sig: TAKE ONE TABLET BY MOUTH EVERY 6 HOURS AS NEEDED FOR PAIN        Last Filled:      Patient Phone Number: [phone] (home) [phone] (work)    Last appt: 11/17/2021   Next appt: 12/13/2023    Last OARRS:       1/17/2023     5:58 PM   RX Monitoring   Periodic Controlled Substance Monitoring Possible medication side effects, risk of tolerance/dependence & alternative treatments discussed.

## 2024-06-05 ENCOUNTER — TELEPHONE (OUTPATIENT)
Dept: ORTHOPEDIC SURGERY | Age: 64
End: 2024-06-05

## 2024-06-05 NOTE — TELEPHONE ENCOUNTER
Other PATIENT SAYS RIGHT KNEE IS IN PAIN. PATIENT SCHEDULED ONLINE AND APPT WAS CANCELED DUE TO INCORRECT APPT SLOT. TRIED TO EXPLAIN TO PATIENT THE DOCTOR WOULD BE OUT OF THE OFFICE AND SHE WAS SCHEDULED NEXT AVAILABLE SHE WOULD LIKE TO DISCUSS PAIN.

## 2024-06-07 NOTE — TELEPHONE ENCOUNTER
S/W pt's son, he said they are sched for 6/25.  I asked if anything was needed, like medication refill and he said no, they will discuss with the doctor at the appt.

## 2024-06-21 PROBLEM — R32 INCONTINENCE: Status: ACTIVE | Noted: 2022-03-02

## 2024-06-21 PROBLEM — K64.2 GRADE III HEMORRHOIDS: Status: ACTIVE | Noted: 2023-01-02

## 2024-06-21 PROBLEM — K76.0 FATTY LIVER: Status: ACTIVE | Noted: 2023-01-11

## 2024-06-21 PROBLEM — R10.2 PELVIC PAIN: Status: ACTIVE | Noted: 2023-01-02

## 2024-06-21 PROBLEM — J45.909 ASTHMA: Status: ACTIVE | Noted: 2022-02-07

## 2024-06-21 PROBLEM — R73.03 PREDIABETES: Status: ACTIVE | Noted: 2023-04-05

## 2024-06-21 PROBLEM — N39.46 MIXED STRESS AND URGE URINARY INCONTINENCE: Status: ACTIVE | Noted: 2023-04-05

## 2024-06-21 RX ORDER — DICYCLOMINE HCL 20 MG
20 TABLET ORAL EVERY 6 HOURS
COMMUNITY
Start: 2024-04-27

## 2024-06-21 RX ORDER — IBUPROFEN 600 MG/1
600 TABLET ORAL EVERY 6 HOURS PRN
COMMUNITY
Start: 2023-10-18 | End: 2024-06-25

## 2024-06-21 RX ORDER — ATORVASTATIN CALCIUM 10 MG/1
10 TABLET, FILM COATED ORAL DAILY
COMMUNITY
Start: 2023-04-05

## 2024-06-25 ENCOUNTER — OFFICE VISIT (OUTPATIENT)
Dept: ORTHOPEDIC SURGERY | Age: 64
End: 2024-06-25
Payer: MEDICAID

## 2024-06-25 VITALS — HEIGHT: 65 IN | WEIGHT: 155 LBS | BODY MASS INDEX: 25.83 KG/M2

## 2024-06-25 DIAGNOSIS — M21.161 ACQUIRED VARUS DEFORMITY KNEE, RIGHT: ICD-10-CM

## 2024-06-25 DIAGNOSIS — M17.11 PRIMARY OSTEOARTHRITIS OF RIGHT KNEE: Primary | ICD-10-CM

## 2024-06-25 DIAGNOSIS — M25.561 RIGHT KNEE PAIN, UNSPECIFIED CHRONICITY: ICD-10-CM

## 2024-06-25 PROCEDURE — 99214 OFFICE O/P EST MOD 30 MIN: CPT | Performed by: INTERNAL MEDICINE

## 2024-06-25 PROCEDURE — 20610 DRAIN/INJ JOINT/BURSA W/O US: CPT | Performed by: INTERNAL MEDICINE

## 2024-06-25 RX ORDER — SUCRALFATE ORAL 1 G/10ML
1 SUSPENSION ORAL
COMMUNITY
Start: 2024-06-19

## 2024-06-25 RX ORDER — TRAMADOL HYDROCHLORIDE 50 MG/1
50 TABLET ORAL EVERY 8 HOURS PRN
Qty: 15 TABLET | Refills: 0 | Status: SHIPPED | OUTPATIENT
Start: 2024-06-25 | End: 2024-06-30

## 2024-06-25 RX ORDER — ONDANSETRON 4 MG/1
4 TABLET, FILM COATED ORAL EVERY 8 HOURS PRN
COMMUNITY
Start: 2024-06-19

## 2024-06-25 NOTE — PROGRESS NOTES
with flexion      Strength: Normal with SLR      Special Tests:   Lachman test negative, grade 1 pseudovarus instability anterior/posterior drawer negative, medial and lateral [facility] testing negative, patella femoral provacative Negative      Skin: There are no rashes, ulcerations or lesions.      Gait: Antalgic      Reflex intact lower     Additional Comments:        Additional Examinations:                   Office Imaging Results/Procedures PerformedToday:        4 views right knee comparison standing/merchant/morales views left knee   Impression: Grade 3 varus osteoarthritis affecting the right knee grade 1-2 degenerative change affecting the lateral compartment; grade 1-2 degenerative change affecting the left tibiofemoral joint.  Lateral projection demonstrates patellofemoral joint is anatomic merchant projection demonstrates grade 1-2 degenerative changes bilaterally.     Office Procedures:     Orders Placed This Encounter   Procedures    XR KNEE RIGHT (MIN 4 VIEWS)     Standing Status:   Future     Number of Occurrences:   1     Standing Expiration Date:   6/25/2025     Order Specific Question:   Reason for exam:     Answer:   Knee Pain    US GUIDED NEEDLE PLACEMENT     Standing Status:   Future     Number of Occurrences:   1     Standing Expiration Date:   6/25/2025     Order Specific Question:   Reason for exam:     Answer:   HA inj    MT ARTHROCENTESIS ASPIR&/INJ MAJOR JT/BURSA W/O US     [employer] E Ultrasound/ 10 HZ    The patient was placed supine on the examination table with the knees supported. The    right    lower extremity was slightly abducted . The ultrasound was placed on knee preset function and the linear transducer was placed transversely  over the medial patellofemoral joint and the medial patella femoral  joint recess was identified.  The skin was prepped in sterile fashion. Sterile ultrasound gel  and topical anesthetic were utilized.  Using axial technique, a 22 GA 40 mm needle was

## 2024-10-08 ENCOUNTER — OFFICE VISIT (OUTPATIENT)
Dept: ORTHOPEDIC SURGERY | Age: 64
End: 2024-10-08

## 2024-10-08 DIAGNOSIS — M21.161 ACQUIRED VARUS DEFORMITY KNEE, RIGHT: ICD-10-CM

## 2024-10-08 DIAGNOSIS — M17.11 PRIMARY OSTEOARTHRITIS OF RIGHT KNEE: Primary | ICD-10-CM

## 2024-10-08 RX ORDER — MELOXICAM 15 MG/1
15 TABLET ORAL DAILY PRN
Qty: 30 TABLET | Refills: 2 | Status: SHIPPED | OUTPATIENT
Start: 2024-10-08

## 2024-10-08 RX ORDER — METHYLPREDNISOLONE ACETATE 40 MG/ML
40 INJECTION, SUSPENSION INTRA-ARTICULAR; INTRALESIONAL; INTRAMUSCULAR; SOFT TISSUE ONCE
Status: COMPLETED | OUTPATIENT
Start: 2024-10-08 | End: 2024-10-08

## 2024-10-08 RX ORDER — ROPIVACAINE HYDROCHLORIDE 5 MG/ML
3 INJECTION, SOLUTION EPIDURAL; INFILTRATION; PERINEURAL ONCE
Status: COMPLETED | OUTPATIENT
Start: 2024-10-08 | End: 2024-10-08

## 2024-10-08 RX ADMIN — METHYLPREDNISOLONE ACETATE 40 MG: 40 INJECTION, SUSPENSION INTRA-ARTICULAR; INTRALESIONAL; INTRAMUSCULAR; SOFT TISSUE at 16:03

## 2024-10-08 RX ADMIN — ROPIVACAINE HYDROCHLORIDE 3 ML: 5 INJECTION, SOLUTION EPIDURAL; INFILTRATION; PERINEURAL at 16:04

## 2024-10-08 NOTE — PROGRESS NOTES
Patient need to establish with a new primary care physician.   visit.     General Exam:     Constitutional: Patient is adequately groomed with no evidence of malnutrition  Mental Status: The patient is oriented to time, place and person.  The patient's mood and affect are appropriate.  Vascular: Examination reveals no swelling or calf tenderness.  Peripheral pulses are palpable and 2+.  Lymphatics: no lymphadenopathy of the inguinal region or lower extremity     Physical Exam: right knee      Primary Exam:    Inspection: Asymmetric genu varum no appreciable effusion or atrophy      Palpation: Mild tenderness MJL      Range of Motion: 0/120 pain with flexion      Strength: Normal with SLR      Special Tests: Grade 1 pseudo varus instability knee stable to valgus stressing Lachman test negative      Skin: There are no rashes, ulcerations or lesions.      Gait: Cane assisted     Neurovascular - non focal and intact       Additional Comments:        Additional Examinations:                    Office Imaging Results/Procedures PerformedToday:            Office Procedures:     Orders Placed This Encounter   Procedures    US GUIDED NEEDLE PLACEMENT     Standing Status:   Future     Number of Occurrences:   1     Standing Expiration Date:   10/8/2025     Order Specific Question:   Reason for exam:     Answer:   Right knee injection     Logic E Ultrasound / linear transducer 10 HZ    The patient was placed supine on the examination table with the knees supported. The  RT      Lower extremity was slightly abducted . The ultrasound was placed on knee preset function and the linear transducer was placed transversely  over the medial patellofemoral joint and the medial patella femoral  joint recess was identified.  The skin was prepped in sterile fashion. Sterile ultrasound gel  and topical anesthetic were utilized.  Using axial technique, a 25 GA 40 mm needle was advanced under direct guidance into the medial patellofemoral joint recess and 4ml of 0.5% ropivacaine and 1 ml of Depo-Medrol

## 2024-11-07 ENCOUNTER — HOSPITAL ENCOUNTER (OUTPATIENT)
Age: 64
Setting detail: OUTPATIENT SURGERY
Discharge: HOME | End: 2024-11-07
Attending: INTERNAL MEDICINE | Admitting: INTERNAL MEDICINE
Payer: MEDICAID

## 2024-11-07 ENCOUNTER — ANESTHESIA EVENT (OUTPATIENT)
Dept: ENDOSCOPY | Age: 64
End: 2024-11-07
Payer: MEDICAID

## 2024-11-07 ENCOUNTER — APPOINTMENT (OUTPATIENT)
Dept: ENDOSCOPY | Age: 64
End: 2024-11-07
Attending: INTERNAL MEDICINE
Payer: MEDICAID

## 2024-11-07 ENCOUNTER — ANESTHESIA (OUTPATIENT)
Dept: ENDOSCOPY | Age: 64
End: 2024-11-07
Payer: MEDICAID

## 2024-11-07 ENCOUNTER — HOSPITAL ENCOUNTER (OUTPATIENT)
Dept: ENDOSCOPY | Age: 64
Setting detail: OUTPATIENT SURGERY
Discharge: HOME | End: 2024-11-09
Attending: INTERNAL MEDICINE
Payer: MEDICAID

## 2024-11-07 VITALS
BODY MASS INDEX: 26.82 KG/M2 | HEIGHT: 65 IN | HEART RATE: 51 BPM | WEIGHT: 161 LBS | TEMPERATURE: 97 F | OXYGEN SATURATION: 99 % | RESPIRATION RATE: 18 BRPM | DIASTOLIC BLOOD PRESSURE: 78 MMHG | SYSTOLIC BLOOD PRESSURE: 113 MMHG

## 2024-11-07 DIAGNOSIS — Z12.11 ENCOUNTER FOR SCREENING COLONOSCOPY: ICD-10-CM

## 2024-11-07 LAB
EKG ATRIAL RATE: 57 BPM
EKG DIAGNOSIS: NORMAL
EKG P AXIS: 41 DEGREES
EKG P-R INTERVAL: 182 MS
EKG Q-T INTERVAL: 442 MS
EKG QRS DURATION: 82 MS
EKG QTC CALCULATION (BAZETT): 430 MS
EKG R AXIS: 8 DEGREES
EKG T AXIS: 22 DEGREES
EKG VENTRICULAR RATE: 57 BPM
GLUCOSE BLD-MCNC: 98 MG/DL (ref 70–99)
PERFORMED ON: NORMAL

## 2024-11-07 PROCEDURE — 7100000011 HC PHASE II RECOVERY - ADDTL 15 MIN: Performed by: INTERNAL MEDICINE

## 2024-11-07 PROCEDURE — 93010 ELECTROCARDIOGRAM REPORT: CPT | Performed by: INTERNAL MEDICINE

## 2024-11-07 PROCEDURE — 2709999900 HC NON-CHARGEABLE SUPPLY: Performed by: INTERNAL MEDICINE

## 2024-11-07 PROCEDURE — 3609027000 HC COLONOSCOPY: Performed by: INTERNAL MEDICINE

## 2024-11-07 PROCEDURE — 3609017100 HC EGD: Performed by: INTERNAL MEDICINE

## 2024-11-07 PROCEDURE — 88305 TISSUE EXAM BY PATHOLOGIST: CPT

## 2024-11-07 PROCEDURE — 7100000010 HC PHASE II RECOVERY - FIRST 15 MIN: Performed by: INTERNAL MEDICINE

## 2024-11-07 PROCEDURE — 3700000000 HC ANESTHESIA ATTENDED CARE: Performed by: INTERNAL MEDICINE

## 2024-11-07 PROCEDURE — 6360000002 HC RX W HCPCS: Performed by: INTERNAL MEDICINE

## 2024-11-07 PROCEDURE — 93005 ELECTROCARDIOGRAM TRACING: CPT | Performed by: ANESTHESIOLOGY

## 2024-11-07 PROCEDURE — 6360000002 HC RX W HCPCS: Performed by: NURSE ANESTHETIST, CERTIFIED REGISTERED

## 2024-11-07 PROCEDURE — 3700000001 HC ADD 15 MINUTES (ANESTHESIA): Performed by: INTERNAL MEDICINE

## 2024-11-07 RX ORDER — LIDOCAINE HYDROCHLORIDE 20 MG/ML
INJECTION, SOLUTION INTRAVENOUS
Status: DISCONTINUED | OUTPATIENT
Start: 2024-11-07 | End: 2024-11-07 | Stop reason: SDUPTHER

## 2024-11-07 RX ORDER — SODIUM CHLORIDE, SODIUM LACTATE, POTASSIUM CHLORIDE, CALCIUM CHLORIDE 600; 310; 30; 20 MG/100ML; MG/100ML; MG/100ML; MG/100ML
INJECTION, SOLUTION INTRAVENOUS CONTINUOUS
Status: DISCONTINUED | OUTPATIENT
Start: 2024-11-07 | End: 2024-11-07 | Stop reason: HOSPADM

## 2024-11-07 RX ORDER — DICYCLOMINE HYDROCHLORIDE 10 MG/ML
20 INJECTION INTRAMUSCULAR ONCE
Status: COMPLETED | OUTPATIENT
Start: 2024-11-07 | End: 2024-11-07

## 2024-11-07 RX ORDER — PROPOFOL 10 MG/ML
INJECTION, EMULSION INTRAVENOUS
Status: DISCONTINUED | OUTPATIENT
Start: 2024-11-07 | End: 2024-11-07 | Stop reason: SDUPTHER

## 2024-11-07 RX ADMIN — PROPOFOL 70 MG: 10 INJECTION, EMULSION INTRAVENOUS at 12:45

## 2024-11-07 RX ADMIN — LIDOCAINE HYDROCHLORIDE 100 MG: 20 INJECTION, SOLUTION INTRAVENOUS at 12:45

## 2024-11-07 RX ADMIN — DICYCLOMINE HYDROCHLORIDE 20 MG: 10 INJECTION, SOLUTION INTRAMUSCULAR at 14:29

## 2024-11-07 RX ADMIN — PROPOFOL 30 MG: 10 INJECTION, EMULSION INTRAVENOUS at 12:56

## 2024-11-07 RX ADMIN — PROPOFOL 20 MG: 10 INJECTION, EMULSION INTRAVENOUS at 13:01

## 2024-11-07 RX ADMIN — PROPOFOL 30 MG: 10 INJECTION, EMULSION INTRAVENOUS at 12:58

## 2024-11-07 RX ADMIN — PROPOFOL 20 MG: 10 INJECTION, EMULSION INTRAVENOUS at 12:50

## 2024-11-07 RX ADMIN — PROPOFOL 30 MG: 10 INJECTION, EMULSION INTRAVENOUS at 13:00

## 2024-11-07 RX ADMIN — PROPOFOL 50 MG: 10 INJECTION, EMULSION INTRAVENOUS at 13:06

## 2024-11-07 RX ADMIN — SODIUM CHLORIDE, SODIUM LACTATE, POTASSIUM CHLORIDE, CALCIUM CHLORIDE 500 ML: 600; 310; 30; 20 INJECTION, SOLUTION INTRAVENOUS at 14:59

## 2024-11-07 RX ADMIN — PROPOFOL 20 MG: 10 INJECTION, EMULSION INTRAVENOUS at 12:54

## 2024-11-07 RX ADMIN — PROPOFOL 30 MG: 10 INJECTION, EMULSION INTRAVENOUS at 12:48

## 2024-11-07 ASSESSMENT — PAIN - FUNCTIONAL ASSESSMENT
PAIN_FUNCTIONAL_ASSESSMENT: PREVENTS OR INTERFERES SOME ACTIVE ACTIVITIES AND ADLS
PAIN_FUNCTIONAL_ASSESSMENT: PREVENTS OR INTERFERES SOME ACTIVE ACTIVITIES AND ADLS
PAIN_FUNCTIONAL_ASSESSMENT: 0-10
PAIN_FUNCTIONAL_ASSESSMENT: PREVENTS OR INTERFERES SOME ACTIVE ACTIVITIES AND ADLS
PAIN_FUNCTIONAL_ASSESSMENT: PREVENTS OR INTERFERES SOME ACTIVE ACTIVITIES AND ADLS
PAIN_FUNCTIONAL_ASSESSMENT: 0-10
PAIN_FUNCTIONAL_ASSESSMENT: PREVENTS OR INTERFERES SOME ACTIVE ACTIVITIES AND ADLS
PAIN_FUNCTIONAL_ASSESSMENT: 0-10

## 2024-11-07 ASSESSMENT — PAIN SCALES - GENERAL: PAINLEVEL_OUTOF10: 7

## 2024-11-07 ASSESSMENT — PAIN DESCRIPTION - DESCRIPTORS
DESCRIPTORS: DISCOMFORT
DESCRIPTORS: DISCOMFORT
DESCRIPTORS: STABBING
DESCRIPTORS: DISCOMFORT;STABBING
DESCRIPTORS: DISCOMFORT
DESCRIPTORS: DISCOMFORT

## 2024-11-07 ASSESSMENT — PAIN DESCRIPTION - LOCATION: LOCATION: ABDOMEN

## 2024-11-07 NOTE — PROGRESS NOTES
Pre-op and post-op expectations explained and pt verbalizes understanding. No new questions at this time. Pt reports having abdominal pain for the past year that has been more constant over the past month. She describes the pain as \"stabbing and gripping.\"  services used for pre-op, daughter and granddaughter at bedside.

## 2024-11-07 NOTE — PROGRESS NOTES
Ambulatory Surgery/Procedure Discharge Note    Vitals:    11/07/24 1439   BP: 113/78   Pulse: 51   Resp: 18   Temp: 97 °F (36.1 °C)   SpO2: 99%       No intake/output data recorded.    Restroom use offered before discharge.  Yes    Pain assessment:  level of pain (1-10, 10 severe),   Pain is improving and at discharge, pt rates pain down to 5-6        Patient discharged to home/self care. Patient discharged via wheel chair by transporter to waiting family/S.O.       11/7/2024 2:56 PM

## 2024-11-07 NOTE — DISCHARGE INSTRUCTIONS
ENDOSCOPY DISCHARGE INSTRUCTIONS:    Call the physician that did your procedure for any questions or concern:    [facility]: [phone]  DR. EMMA ARMSTRONG      ACTIVITY:    There are potential side effects to the medications used for sedation and anesthesia during your procedure.  These include:  Dizziness or light-headedness, confusion or memory loss, delayed reaction times, loss of coordination, nausea and vomiting.  Because of your increased risk for injury, we ask that you observe the following precautions:  For the next 24 hours,  DO NOT operate an automobile, bicycle, motorcycle, , power tools or large equipment of any kind.  Do not drink alcohol, sign any legal documents or make any legal decisions for 24 hours.  Do not bend your head over lower than your heart.  DO sit on the side of bed/couch awhile before getting up.  Plan on bedrest or quiet relaxation today.  You may resume normal activities in 24 hours.    DIET:    Your first meal today should be light, avoiding spicy and fatty foods.  If you tolerate this first meal, then you may advance to your regular diet unless otherwise advised by your physician.    NORMAL SYMPTOMS:  -Mild sore throat if you’ve had an EGD   -Gaseous discomfort    NOTIFY YOUR PHYSICIAN IF THESE SYMPTOMS OCCUR:  1. Fever (greater than 100)  5. Increased abdominal bloating  2. Severe pain    6. Excessive bleeding  3. Nausea and vomiting  7. Chest pain                                                                    4. Chills    8. Shortness of breath    ADDITIONAL INSTRUCTIONS:    Biopsy results: Call [facility] for biopsy results in 1 week      Recommendations:    Pathology results will be posted to the patient portal in about a week.  Will give Bentyl to be taken on a as needed basis  Repeat Colonoscopy in 10 years.        Please review these discharge instructions this evening or tomorrow for  information you may have forgotten.            We want to thank  you for choosing the [facility] as your health care provider. We always strive to provide you with excellent care while you are here. You may receive a survey in the mail regarding your care. We would appreciate you taking a few minutes of your time to complete this survey.

## 2024-11-07 NOTE — OP NOTE
Esophagogastroduodenoscopy and Colonoscopy        Esophagogastroduodenoscopy      Patient: Makayla Shukla  : 1960     Procedure: Esophagogastroduodenoscopy with biopsy    Date:  2024     Anesthesia: MAC    Indications: Abdominal pain.     Description of Procedure:  Informed consent was obtained from the patient after explanation of indications, benefits and possible risks and complications of the procedure.  The procedure was done at bedside. Patient was placed in the left lateral decubitus position and the above IV sedation was administrered.    The Olympus videoendoscope was placed in the patient's mouth and under direct visualization passed into the esophagus.   The scope was then advanced into the stomach and then into the second portion of the duodenum.       Esophagus showed no abnormalities  The stomach showed hiatal hernia noted.  There is moderate gastritis in the antrum, biopsies taken for H. pylori.  The duodenum showed no abnormalities, biopsies taken for celiac disease    Retroflexed views of the cardia and fundus showed no other abnormalities.  The scope was anteflexed and the stomach was decompressed, and the scope was completely withdrawn.  The patient tolerated the procedure well.    EBL: None    Impression:    Hiatal hernia  Nonerosive gastritis    Recommendations:     Biopsy results will be posted on the portal in a week  Will increase PPI to 40 mg twice a day    Colonoscopy     Indications: Abdominal pain, negative workup imaging wise    Procedure:   An informed consent was obtained from the patient after explanation of indications, benefits, possible risks and complications of the procedure.  The patient was then taken to the endoscopy suite, placed in the left lateral decubitus position, and the above IV anesthesia was administered.    A digital rectal examination was performed and revealed negative without mass, lesions or tenderness.      The [employer] CFQ-[id number] video

## 2024-11-07 NOTE — ANESTHESIA POSTPROCEDURE EVALUATION
Department of Anesthesiology  Postprocedure Note    Patient: Makayla Shukla  MRN: [id number]  YOB: 1960  Date of evaluation: 11/7/2024    Procedure Summary       Date: 11/07/24 Room / Location: [place] [address] / [facility]    Anesthesia Start: 1229 Anesthesia Stop: 1311    Procedures:       COLONOSCOPY      ESOPHAGOGASTRODUODENOSCOPY BIOPSY Diagnosis:       Encounter for screening colonoscopy      (Encounter for screening colonoscopy [Z12.11])    Surgeons: Pj Valdez MD Responsible Provider: Luis Gunter MD    Anesthesia Type: MAC ASA Status: 3            Anesthesia Type: No value filed.    Laila Phase I: Laila Score: 10    Laila Phase II: Laila Score: 10    Anesthesia Post Evaluation    Patient location during evaluation: PACU  Patient participation: complete - patient participated  Level of consciousness: awake  Pain score: 0  Airway patency: patent  Nausea & Vomiting: no nausea  Cardiovascular status: hemodynamically stable  Respiratory status: acceptable  Hydration status: stable  Pain management: adequate    No notable events documented.

## 2024-11-07 NOTE — ANESTHESIA PRE PROCEDURE
Department of Anesthesiology  Preprocedure Note       Name:  Makayla Shukla   Age:  64 y.o.  :  1960                                          MRN:  [id number]         Date:  2024      Surgeon: Surgeon(s):  Pj Valdez MD    Procedure: Procedure(s):  COLONOSCOPY    Medications prior to admission:   Prior to Admission medications    Medication Sig Start Date End Date Taking? Authorizing Provider   meloxicam (MOBIC) 15 MG tablet Take 1 tablet by mouth daily as needed for Pain 10/8/24   Akin Vazquez MD   diclofenac sodium (VOLTAREN) 1 % GEL Apply 2 g topically 3 times daily as needed for Pain Apply 2 -4 g topically bid to tid  as needed for Pain 10/8/24   Akin Vazquez MD   methylcellulose 500 MG TABS Take 2 tablets by mouth daily 3/13/17   Stefan Pedraza MD   ondansetron (ZOFRAN) 4 MG tablet Take 1 tablet by mouth every 8 hours as needed 24   Stefan Pedraza MD   sucralfate (CARAFATE) 1 GM/10ML suspension Take 10 mLs by mouth 4 times daily (before meals and nightly) 24   Stefan Pedraza MD   atorvastatin (LIPITOR) 10 MG tablet Take 1 tablet by mouth daily 23   Stefan Pedraza MD   dicyclomine (BENTYL) 20 MG tablet Take 1 tablet by mouth every 6 hours 24   Stefan Pedraza MD   metFORMIN (GLUCOPHAGE) 500 MG tablet Take 1 tablet by mouth 2 times daily (with meals) 23   Stefan Pedraza MD   tiZANidine (ZANAFLEX) 4 MG tablet TAKE 1 TABLET BY MOUTH TWICE DAILY AS NEEDED FOR MUSCLE TIGHTNESS AND SPASM 23   Akin Vazquez MD   ACETAMINOPHEN EXTRA STRENGTH 500 MG tablet TAKE ONE TABLET BY MOUTH EVERY 6 HOURS AS NEEDED FOR PAIN 22   Anny Tavarez MD   gabapentin (NEURONTIN) 100 MG capsule Take 300 mg by mouth nightly.     Stefan Pedraza MD   methyl salicylate-menthol (MICHOACANO OLIVARES GREASELESS) 10-15 % CREA Apply topically 3 times daily as needed for Pain 21   Usha Hernandez, APRN - CNP

## 2024-11-07 NOTE — PROGRESS NOTES
Pt gets up to the restroom with assistance of nurse and daughter. Upon returning to bed, pt complains of severe abd pain with distention. Pt asks for a warm blanket to place on her abdomen. Pt's SBP 70's. MD Gunter aware. Fluids infusing per Dr. Gunter to give patient a bolus. Dr. Oliva aware of pt's pain and bp.

## 2024-11-08 NOTE — H&P
History and Physical / Pre-Sedation Assessment      PMHx:   Past Medical History:   Diagnosis Date    Arthritis     Asthma     Chronic bilateral thoracic back pain 06/13/2018    Diabetes mellitus (HCC)     Fatty liver 01/11/2023    GERD (gastroesophageal reflux disease)     Hypothyroidism 07/21/2021    Incontinence 03/02/2022    Intractable chronic migraine without aura and without status migrainosus 07/29/2016    Primary osteoarthritis of both hands 10/20/2015    Thyroid disease        Medications:   Prior to Admission medications    Medication Sig Start Date End Date Taking? Authorizing Provider   metFORMIN (GLUCOPHAGE) 500 MG tablet Take 1 tablet by mouth 2 times daily (with meals) 4/5/23  Yes Stefan Pedraza MD   tiZANidine (ZANAFLEX) 4 MG tablet TAKE 1 TABLET BY MOUTH TWICE DAILY AS NEEDED FOR MUSCLE TIGHTNESS AND SPASM 7/26/23  Yes Akin Vazquez MD   ACETAMINOPHEN EXTRA STRENGTH 500 MG tablet TAKE ONE TABLET BY MOUTH EVERY 6 HOURS AS NEEDED FOR PAIN 9/23/22  Yes Anny Tavarez MD   gabapentin (NEURONTIN) 100 MG capsule Take 3 capsules by mouth nightly.   Yes Stefan Pedraza MD   traZODone (DESYREL) 50 MG tablet TAKE 1 TABLET BY MOUTH EVERY NIGHT 9/17/21  Yes Anny Tavarez MD   omeprazole (PRILOSEC) 20 MG delayed release capsule TAKE ONE CAPSULE BY MOUTH DAILY 9/17/21  Yes Anny Tavarez MD   levothyroxine (SYNTHROID) 50 MCG tablet Take 1 tablet by mouth daily 9/17/21  Yes Anny Tavarez MD   albuterol sulfate HFA (PROVENTIL HFA) 108 (90 Base) MCG/ACT inhaler Inhale 2 puffs into the lungs every 6 hours as needed for Wheezing or Shortness of Breath (and/or persistent cough) 9/17/21  Yes Anny Tavarez MD   meloxicam (MOBIC) 15 MG tablet Take 1 tablet by mouth daily as needed for Pain  Patient not taking: Reported on 11/7/2024 10/8/24   Akin Vazquez MD   diclofenac sodium (VOLTAREN) 1 % GEL Apply 2 g topically 3 times daily as needed for Pain Apply 2 -4 g  standard drinks of alcohol    Drug use: No    Sexual activity: Never   Other Topics Concern    Not on file   Social History Narrative    Not on file     Social Determinants of Health     Financial Resource Strain: Low Risk  (11/17/2021)    Overall Financial Resource Strain (CARDIA)     Difficulty of Paying Living Expenses: Not hard at all   Food Insecurity: No Transportation Needs (10/7/2024)    Received from [employer] [employer]    Yearly Questionnaire     Do you need any assistance with obtaining housing, meals, medication, transportation or medical equipment?: No     Assistance needed for:: Not on file   Transportation Needs: No Transportation Needs (10/7/2024)    Received from [employer] [employer]    Yearly Questionnaire     Do you need any assistance with obtaining housing, meals, medication, transportation or medical equipment?: No     Assistance needed for:: Not on file   Physical Activity: Not on file   Stress: Not on file   Social Connections: Not on file   Intimate Partner Violence: Not on file   Housing Stability: No Transportation Needs (10/7/2024)    Received from [employer]    Yearly Questionnaire     Do you need any assistance with obtaining housing, meals, medication, transportation or medical equipment?: No     Assistance needed for:: Not on file       Family Hx:   Family History   Family history unknown: Yes       Physical Exam:  Vital Signs: /78   Pulse 51   Temp 97 °F (36.1 °C) (Temporal)   Resp 18   Ht 1.651 m (5' 5\")   Wt 73 kg (161 lb)   SpO2 99%   BMI 26.79 kg/m²    Airway: Mallampati: I (soft palate, uvula, fauces, tonsillar pillars visible)  Pulmonary:Normal  Cardiac:Normal  Abdomen:Normal    Pre-Procedure Assessment / Plan:  ASA: Class 2 - A normal healthy patient with mild systemic disease  Level of Sedation Plan:Deep sedation  Post Procedure plan: Return to same level of care    I assessed the patient and find that the patient is in

## 2025-01-08 NOTE — TELEPHONE ENCOUNTER
Rx Request: MELOXICAM    Last Filled: 10/08/2024  Refill Due: 12/07/2024  Last OV: 10/08/2024  Follow Up: NONE

## 2025-01-09 DIAGNOSIS — Z79.1 NSAID LONG-TERM USE: Primary | ICD-10-CM

## 2025-01-09 RX ORDER — MELOXICAM 15 MG/1
15 TABLET ORAL DAILY PRN
Qty: 30 TABLET | Refills: 1 | Status: SHIPPED | OUTPATIENT
Start: 2025-01-09

## (undated) DEVICE — PACK PROCEDURE SURG EXTREMITY MFFOP CUST

## (undated) DEVICE — MEDICINE CUP, GRADUATED, STER: Brand: MEDLINE

## (undated) DEVICE — GLOVE SURG SZ 6.5 L11.2IN FNGR THK9.8MIL STRW LTX POLYMER

## (undated) DEVICE — SHEET, T, LAPAROTOMY, STERILE: Brand: MEDLINE

## (undated) DEVICE — CANNULA SAMP CO2 AD GRN 7FT CO2 AND 7FT O2 TBNG UNIV CONN

## (undated) DEVICE — INTENDED FOR TISSUE SEPARATION, AND OTHER PROCEDURES THAT REQUIRE A SHARP SURGICAL BLADE TO PUNCTURE OR CUT.: Brand: BARD-PARKER ® STAINLESS STEEL BLADES

## (undated) DEVICE — FORCEPS BX L240CM WRK CHN 2.8MM STD CAP W/ NDL MIC MESH

## (undated) DEVICE — YANKAUER SUCTION INSTRUMENT NO CONTROL VENT, BULB TIP, CLEAR: Brand: YANKAUER

## (undated) DEVICE — GAUZE,SPONGE,4"X4",8PLY,STRL,LF,10/TRAY: Brand: MEDLINE

## (undated) DEVICE — PENCIL SMK EVAC L10FT TBNG NONSTICK ESU BLDE PLUMEPEN ELITE

## (undated) DEVICE — HYPODERMIC SAFETY NEEDLE: Brand: MAGELLAN

## (undated) DEVICE — SOLUTION IV IRRIG 500ML 0.9% SODIUM CHL 2F7123

## (undated) DEVICE — TRAY PREP DRY W/ PREM GLV 2 APPL 6 SPNG 2 UNDPD 1 OVERWRAP

## (undated) DEVICE — SUTURE CHROMIC GUT SZ 2-0 L27IN ABSRB BRN L26MM SH 1/2 CIR G123H

## (undated) DEVICE — TOWEL,OR,DSP,ST,BLUE,STD,4/PK,20PK/CS: Brand: MEDLINE

## (undated) DEVICE — ELECTRODE PT RET AD L9FT HI MOIST COND ADH HYDRGEL CORDED

## (undated) DEVICE — JELLY,LUBE,STERILE,FLIP TOP,TUBE,2-OZ: Brand: MEDLINE

## (undated) DEVICE — PAD,ABDOMINAL,8"X10",ST,LF: Brand: MEDLINE